# Patient Record
Sex: FEMALE | Race: WHITE | NOT HISPANIC OR LATINO | Employment: OTHER | URBAN - METROPOLITAN AREA
[De-identification: names, ages, dates, MRNs, and addresses within clinical notes are randomized per-mention and may not be internally consistent; named-entity substitution may affect disease eponyms.]

---

## 2018-01-17 ENCOUNTER — HOSPITAL ENCOUNTER (EMERGENCY)
Facility: CLINIC | Age: 73
Discharge: HOME | End: 2018-01-17
Attending: PHYSICIAN ASSISTANT | Admitting: PHYSICIAN ASSISTANT
Payer: MEDICARE

## 2018-01-17 VITALS
HEIGHT: 65 IN | TEMPERATURE: 98.9 F | BODY MASS INDEX: 34.99 KG/M2 | SYSTOLIC BLOOD PRESSURE: 169 MMHG | HEART RATE: 72 BPM | WEIGHT: 210 LBS | DIASTOLIC BLOOD PRESSURE: 79 MMHG | RESPIRATION RATE: 18 BRPM | OXYGEN SATURATION: 97 %

## 2018-01-17 DIAGNOSIS — N30.01 ACUTE CYSTITIS WITH HEMATURIA: ICD-10-CM

## 2018-01-17 LAB
ALBUMIN UR-MCNC: 100 MG/DL
APPEARANCE UR: ABNORMAL
BILIRUB UR QL STRIP: NEGATIVE
COLOR UR AUTO: ABNORMAL
GLUCOSE UR STRIP-MCNC: NEGATIVE MG/DL
HGB UR QL STRIP: ABNORMAL
KETONES UR STRIP-MCNC: NEGATIVE MG/DL
LEUKOCYTE ESTERASE UR QL STRIP: ABNORMAL
NITRATE UR QL: POSITIVE
PH UR STRIP: 6.5 PH (ref 5–7)
RBC #/AREA URNS AUTO: >182 /HPF (ref 0–2)
SOURCE: ABNORMAL
SP GR UR STRIP: 1.01 (ref 1–1.03)
UROBILINOGEN UR STRIP-MCNC: NEGATIVE MG/DL (ref 0–2)
WBC #/AREA URNS AUTO: 954 /HPF (ref 0–2)

## 2018-01-17 PROCEDURE — 81001 URINALYSIS AUTO W/SCOPE: CPT | Performed by: PHYSICIAN ASSISTANT

## 2018-01-17 PROCEDURE — 87086 URINE CULTURE/COLONY COUNT: CPT | Performed by: PHYSICIAN ASSISTANT

## 2018-01-17 PROCEDURE — 87186 SC STD MICRODIL/AGAR DIL: CPT | Performed by: PHYSICIAN ASSISTANT

## 2018-01-17 PROCEDURE — 99283 EMERGENCY DEPT VISIT LOW MDM: CPT

## 2018-01-17 PROCEDURE — 87088 URINE BACTERIA CULTURE: CPT | Performed by: PHYSICIAN ASSISTANT

## 2018-01-17 RX ORDER — CEPHALEXIN 500 MG/1
500 CAPSULE ORAL 3 TIMES DAILY
Qty: 21 CAPSULE | Refills: 0 | Status: SHIPPED | OUTPATIENT
Start: 2018-01-17 | End: 2018-01-24

## 2018-01-17 RX ORDER — LOSARTAN POTASSIUM AND HYDROCHLOROTHIAZIDE 12.5; 1 MG/1; MG/1
1 TABLET ORAL DAILY
COMMUNITY
End: 2019-06-09

## 2018-01-17 RX ORDER — METOPROLOL SUCCINATE 50 MG/1
50 TABLET, EXTENDED RELEASE ORAL DAILY
COMMUNITY
End: 2019-06-09

## 2018-01-17 RX ORDER — PHENAZOPYRIDINE HYDROCHLORIDE 100 MG/1
100 TABLET, FILM COATED ORAL 3 TIMES DAILY PRN
Qty: 6 TABLET | Refills: 0 | Status: SHIPPED | OUTPATIENT
Start: 2018-01-17 | End: 2018-01-19

## 2018-01-17 RX ORDER — VENLAFAXINE HYDROCHLORIDE 225 MG/1
150 TABLET, EXTENDED RELEASE ORAL
Status: ON HOLD | COMMUNITY
End: 2019-11-19

## 2018-01-17 ASSESSMENT — ENCOUNTER SYMPTOMS
ABDOMINAL PAIN: 0
DYSURIA: 1
VOMITING: 0
HEMATURIA: 1
FATIGUE: 1
FLANK PAIN: 0
BACK PAIN: 0
FEVER: 0

## 2018-01-17 NOTE — ED AVS SNAPSHOT
[facility] Emergency Department    [address] [place] [address]    Phone:  [phone]    Fax:  [phone]                                       Princess Edgar   MRN: [id number]    Department:  [facility] Emergency Department   Date of Visit:  1/17/2018           Patient Information     Date Of Birth          1945        Your diagnoses for this visit were:     Acute cystitis with hematuria        You were seen by Tonya Obrien PA-C.      Follow-up Information     Follow up with [facility] Emergency Department.    Specialty:  EMERGENCY MEDICINE    Why:  If symptoms worsen    Contact information:    [address] [place] [address] [phone]        Follow up with [facility] In 2 days.    Contact information:    [facility]  [address] [place] [address]  [phone]          Discharge Instructions       Discharge Instructions  Urinary Tract Infection  You or your child have been diagnosed with a urinary tract infection, or UTI. The urinary tract includes the kidneys (which make urine/pee), ureters (the tubes that carry urine/pee from the kidneys to the bladder), the bladder (which stores urine/pee), and urethra (the tube that carries urine/pee out of the bladder). Urinary tract infections occur when bacteria travel up the urethra into the bladder (bladder infection) and, in some cases, from there into the kidneys (kidney infection).  Generally, every Emergency Department visit should have a follow-up clinic visit with either a primary or a specialty clinic/provider. Please follow-up as instructed by your emergency provider today.  Return to the Emergency Department if:    You or your child have severe back pain.    You or your child are vomiting (throwing up) so that you cannot take your medicine.    You or your child have a new fever (had not previously had a fever) over 101 F.    You or your  child have confusion or are very weak, or feel very ill.    Your child seems much more ill, will not wake up, will not respond right, or is crying for a long time and will not calm down.    You or your child are showing signs of dehydration. These signs may include decreased urination (pee), dry mouth/gums/tongue, or decreased activity.    Follow-up with your provider:     Children under 24 months need to be seen by their regular provider within one week after a diagnosis of a UTI. It may be necessary to do some more tests to look at the child s kidney or bladder.    You should begin to feel better within 24 - 48 hours of starting your antibiotic; follow-up with your regular clinic/doctor/provider if this is not the case.    Treatment:     You will be treated with an antibiotic to kill the bacteria. We have to make an educated guess, based on what we know about common bacteria and antibiotics, as to which antibiotic will work for your infection. We will be correct most times but there will be some cases where the antibiotic chosen is not correct (see urine cultures below).    Take a pain medication such as acetaminophen (Tylenol ) or ibuprofen (Advil , Motrin , Nuprin ).    Phenazopyridine (Pyridium , Uristat ) is a prescription medication that numbs the bladder to reduce the burning pain of some UTIs.  The same medication is available in a non-prescription version (Azo-Standard , Urodol ). This medication will change the color of the urine and tears (usually blue or orange). If you wear contacts, do not wear them while taking this medication as they may be stained by the medication.    Urine Cultures:    If indicated, a urine culture may have been performed today. This test generally takes 24-48 hours to complete so the results are not known at this time. The results can confirm that an infection is present but also determine which antibiotic is effective for the specific bacteria that is causing the infection. If  "your urine culture shows that the antibiotic you were given today will not work to treat your infection, we will attempt to contact you to make arrangements to change the antibiotic. If the culture confirms that the antibiotic is effective for your infection, you will not be contacted. We often recommend follow-up with your regular physician/provider on the culture results regardless of this process.    Antibiotic Warning:     If you have been placed on antibiotics - watch for signs of allergic reaction.  These include rash, lip swelling, difficulty breathing, wheezing, and dizziness.  If you develop any of these symptoms, stop the antibiotic immediately and go to an emergency room or urgent care for evaluation.    Probiotics: If you have been given an antibiotic, you may want to also take a probiotic pill or eat yogurt with live cultures. Probiotics have \"good bacteria\" to help your intestines stay healthy. Studies have shown that probiotics help prevent diarrhea and other intestine problems (including C. diff infection) when you take antibiotics. You can buy these without a prescription in the pharmacy section of the store.   If you were given a prescription for medicine here today, be sure to read all of the information (including the package insert) that comes with your prescription.  This will include important information about the medicine, its side effects, and any warnings that you need to know about.  The pharmacist who fills the prescription can provide more information and answer questions you may have about the medicine.  If you have questions or concerns that the pharmacist cannot address, please call or return to the Emergency Department.   Remember that you can always come back to the Emergency Department if you are not able to see your regular provider in the amount of time listed above, if you get any new symptoms, or if there is anything that worries you.      24 Hour Appointment Hotline       To " make an appointment at any [facility] clinic, call [phone] ([phone]). If you don't have a family doctor or clinic, we will help you find one. [facility] clinics are conveniently located to serve the needs of you and your family.             Review of your medicines      START taking        Dose / Directions Last dose taken    cephALEXin 500 MG capsule   Commonly known as:  KEFLEX   Dose:  500 mg   Quantity:  21 capsule        Take 1 capsule (500 mg) by mouth 3 times daily for 7 days   Refills:  0        phenazopyridine 100 MG tablet   Commonly known as:  PYRIDIUM   Dose:  100 mg   Quantity:  6 tablet        Take 1 tablet (100 mg) by mouth 3 times daily as needed for urinary tract discomfort   Refills:  0          Our records show that you are taking the medicines listed below. If these are incorrect, please call your family doctor or clinic.        Dose / Directions Last dose taken    ALLEGRA PO        Refills:  0        LORAZEPAM PO        Refills:  0        losartan-hydrochlorothiazide 100-12.5 MG per tablet   Commonly known as:  HYZAAR   Dose:  1 tablet        Take 1 tablet by mouth daily   Refills:  0        metoprolol succinate 50 MG 24 hr tablet   Commonly known as:  TOPROL-XL   Dose:  50 mg        Take 50 mg by mouth daily   Refills:  0        RANITIDINE HCL PO   Dose:  150 mg        Take 150 mg by mouth   Refills:  0        SIMVASTATIN PO   Dose:  20 mg        Take 20 mg by mouth   Refills:  0        venlafaxine 225 MG Tb24 24 hr tablet   Commonly known as:  EFFEXOR-ER   Dose:  225 mg        Take 225 mg by mouth daily (with breakfast)   Refills:  0                Prescriptions were sent or printed at these locations (2 Prescriptions)                   Other Prescriptions                Printed at Department/Unit printer (2 of 2)         cephALEXin (KEFLEX) 500 MG capsule               phenazopyridine (PYRIDIUM) 100 MG tablet                Procedures and tests performed during your visit     UA  with Microscopic    Urine Culture      Orders Needing Specimen Collection     None      Pending Results     Date and Time Order Name Status Description    1/17/2018 2057 Urine Culture In process             Pending Culture Results     Date and Time Order Name Status Description    1/17/2018 2057 Urine Culture In process             Pending Results Instructions     If you had any lab results that were not finalized at the time of your Discharge, you can call the ED Lab Result RN at [phone]. You will be contacted by this team for any positive Lab results or changes in treatment. The nurses are available 7 days a week from 10A to 6:30P.  You can leave a message 24 hours per day and they will return your call.        Test Results From Your Hospital Stay        1/17/2018  9:42 PM      Component Results     Component Value Ref Range & Units Status    Color Urine Red  Final    Appearance Urine Cloudy  Final    Glucose Urine Negative NEG^Negative mg/dL Final    Bilirubin Urine Negative NEG^Negative Final    Ketones Urine Negative NEG^Negative mg/dL Final    Specific Gravity Urine 1.015 1.003 - 1.035 Final    Blood Urine Large (A) NEG^Negative Final    pH Urine 6.5 5.0 - 7.0 pH Final    Protein Albumin Urine 100 (A) NEG^Negative mg/dL Final    Urobilinogen mg/dL Negative 0.0 - 2.0 mg/dL Final    Nitrite Urine Positive (A) NEG^Negative Final    Leukocyte Esterase Urine Moderate (A) NEG^Negative Final    Source Midstream Urine  Final    WBC Urine 954 (H) 0 - 2 /HPF Final    RBC Urine >182 (H) 0 - 2 /HPF Final         1/17/2018  9:17 PM                Clinical Quality Measure: Blood Pressure Screening     Your blood pressure was checked while you were in the emergency department today. The last reading we obtained was  BP: 169/79 . Please read the guidelines below about what these numbers mean and what you should do about them.  If your systolic blood pressure (the top number) is less than 120 and your diastolic blood  "pressure (the bottom number) is less than 80, then your blood pressure is normal. There is nothing more that you need to do about it.  If your systolic blood pressure (the top number) is 120-139 or your diastolic blood pressure (the bottom number) is 80-89, your blood pressure may be higher than it should be. You should have your blood pressure rechecked within a year by a primary care provider.  If your systolic blood pressure (the top number) is 140 or greater or your diastolic blood pressure (the bottom number) is 90 or greater, you may have high blood pressure. High blood pressure is treatable, but if left untreated over time it can put you at risk for heart attack, stroke, or kidney failure. You should have your blood pressure rechecked by a primary care provider within the next 4 weeks.  If your provider in the emergency department today gave you specific instructions to follow-up with your doctor or provider even sooner than that, you should follow that instruction and not wait for up to 4 weeks for your follow-up visit.        Thank you for choosing [facility]       Thank you for choosing [facility] for your care. Our goal is always to provide you with excellent care. Hearing back from our patients is one way we can continue to improve our services. Please take a few minutes to complete the written survey that you may receive in the mail after you visit with us. Thank you!        [employer] Information     [employer] lets you send messages to your doctor, view your test results, renew your prescriptions, schedule appointments and more. To sign up, go to [email or web address] . Click on \"Log in\" on the left side of the screen, which will take you to the Welcome page. Then click on \"Sign up Now\" on the right side of the page.     You will be asked to enter the access code listed below, as well as some personal information. Please follow the directions to create your username and password.     Your access code is: " [email or web address]  Expires: 2018  9:54 PM     Your access code will  in 90 days. If you need help or a new code, please call your [facility] clinic or [phone].        Care EveryWhere ID     This is your Care EveryWhere ID. This could be used by other organizations to access your [facility] medical records  [id number]        Equal Access to Services     [facility]: Hadii dena hamo Sobrittny, waaxda luqadaha, qaybta kaalmada adesarahyyada, nabil singh . So [facility] [phone].    ATENCIÓN: Si habla español, tiene a key disposición servicios gratuitos de asistencia lingüística. Llame al [phone].    We comply with applicable federal civil rights laws and [place] laws. We do not discriminate on the basis of race, color, national origin, age, disability, sex, sexual orientation, or gender identity.            After Visit Summary       This is your record. Keep this with you and show to your community pharmacist(s) and doctor(s) at your next visit.

## 2018-01-17 NOTE — ED AVS SNAPSHOT
[facility] Emergency Department    [address]    [facility] [address]    Phone:  [phone]    Fax:  [phone]                                       Princess Edgar   MRN: [id number]    Department:  [facility] Emergency Department   Date of Visit:  1/17/2018           After Visit Summary Signature Page     I have received my discharge instructions, and my questions have been answered. I have discussed any challenges I see with this plan with the nurse or doctor.    ..........................................................................................................................................  Patient/Patient Representative Signature      ..........................................................................................................................................  Patient Representative Print Name and Relationship to Patient    ..................................................               ................................................  Date                                            Time    ..........................................................................................................................................  Reviewed by Signature/Title    ...................................................              ..............................................  Date                                                            Time

## 2018-01-18 NOTE — ED PROVIDER NOTES
"  History     Chief Complaint:  Urinary symptoms    HPI   Princess Edgar is a 73 year old female with a history of bladder infections and diabetes who presents to the emergency department today for evaluation of urinary symptoms. The patient reports since 1800 she developed hematuria, dysuria, and fatigue. She reports symptoms are the same for previous urinary tract infections. The patient denies fevers, vomiting, back pain, flank pain, or abdominal pain.    Allergies:  Augmentin     Medications:    Allegra  Lorazepam   Hyzaar  Metoprolol  Ranitidine  Simvastatin   Effexor    Past Medical History:    Anxiety   Arthritis   Diabetes   Glaucoma (increased eye pressure)   Hypertension     Past Surgical History:    Cardiac surgery   Eye surgery   Orthopedic surgery     Family History:    History reviewed. No pertinent family history.     Social History:  Smokeless Tobacco: Never Used  Alcohol Use: Positive   Marital Status:  Single [1]    Review of Systems   Constitutional: Positive for fatigue. Negative for fever.   Gastrointestinal: Negative for abdominal pain and vomiting.   Genitourinary: Positive for dysuria and hematuria. Negative for flank pain.   Musculoskeletal: Negative for back pain.   All other systems reviewed and are negative.    Physical Exam     Patient Vitals for the past 24 hrs:   BP Temp Temp src Pulse Resp SpO2 Height Weight   01/17/18 2054 - - - - - 97 % - -   01/17/18 2053 169/79 98.9  F (37.2  C) Temporal 72 18 - 1.651 m (5' 5\") 95.3 kg (210 lb)     Physical Exam  Nursing note and vitals reviewed.     GENERAL: Alert, mild distress, non toxic appearing.   HEENT: Normal conjunctiva. No scleral icterus. MMM.   NECK: Supple.  CARDIAC: Normal rate and regular rhythm. Normal heart sounds. No murmurs, rubs, or gallops appreciated.  PULMONARY: CTA bilaterally. Normal breath sounds. No wheezing, crackles, or rhonchi appreciated.  ABDOMEN: Soft, non distended abdomen. Non-tender. No rebound or guarding. "   NEURO: Alert and oriented. Non-focal.   MUSCULOSKELETAL: Normal range of motion. No peripheral edema. No CVA tenderness.    SKIN: Skin is warm and dry. No rashes. No pallor or jaundice.   PSYCH: Normal affect and mood.     Emergency Department Course     Laboratory:  Laboratory findings were communicated with the patient who voiced understanding of the findings.    UA: Blood: Large (A), Protein Albumin: 100 (A), Nitrite: Positive (A), Leukocyte Esterase: Moderate (A), WBC/HPF: 954 (H), RBC/HPF: >182 (H)   Urine Culture: Pending     Emergency Department Course:    2053 Nursing notes and vitals reviewed.    2100 The patient provided a urine sample here in the emergency department. This was sent for laboratory testing, findings above.    2106 I performed an exam of the patient as documented above.     2152 I personally reviewed the laboratory results with the patient and answered all related questions prior to discharge. I discussed the treatment plan with the patient. She expressed understanding of this plan and consented to discharge. She will be discharged home with instructions for care and follow up. In addition, the patient will return to the emergency department if their symptoms persist, worsen, if new symptoms arise or if there is any concern.  All questions were answered.    Impression & Plan      Medical Decision Making:  Princess Edgar is a 73 year old female who presents to the emergency department today for evaluation of symptoms consistent with a urinary tract infection. Urinalysis confirms this. She is non-toxic appearing and well hydrated with no signs of shock, sepsis, or respiratory distress. I will treat with antibiotics. Urine culture pending. I discussed with patient that Radha will contact her should a different antibiotic be indicated based on culture results. No clinical evidence of pyelonephritis. Doubt ureteral stone given no acute flank pain. Given benign abdomen, no indication for  advanced imaging as my suspicion for any acute intraabdominal process including diverticulitis, appendicitis, obstruction, etc. is quite low.    The patient is discharged home with instructions to rest, drink plenty of fluids, take prescribed antibiotics, and Tylenol and Ibuprofen as needed. Recommended follow-up with primary care provider in 48 hours if symptoms are not improving. Reviewed reasons to return to the ED, including fever, vomiting, severe back pain, or worsening symptoms. The patient was in agreement with plan and discharged in satisfactory condition with all questions answered.  Of note, blood pressure slightly elevated. No associated symptoms. No evidence of hypertensive urgency or emergency. She has known history of hypertension. No indication for further work up at this time.     Diagnosis:    ICD-10-CM    1. Acute cystitis with hematuria N30.01      Disposition:   The patient is discharged to home.    Discharge Medications:  Discharge Medication List as of 1/17/2018  9:54 PM      START taking these medications    Details   cephALEXin (KEFLEX) 500 MG capsule Take 1 capsule (500 mg) by mouth 3 times daily for 7 days, Disp-21 capsule, R-0, Local Print      phenazopyridine (PYRIDIUM) 100 MG tablet Take 1 tablet (100 mg) by mouth 3 times daily as needed for urinary tract discomfort, Disp-6 tablet, R-0, Local Print           Scribe Disclosure:  I, Tonie Galeano, am serving as a scribe at 9:04 PM on 1/17/2018 to document services personally performed by Tonya Obrien PA-C based on my observations and the provider's statements to me.    [facility] EMERGENCY DEPARTMENT       Tonya Obrien PA-C  01/17/18 8726

## 2018-01-19 LAB
BACTERIA SPEC CULT: ABNORMAL
BACTERIA SPEC CULT: ABNORMAL
Lab: ABNORMAL
SPECIMEN SOURCE: ABNORMAL

## 2018-01-20 ENCOUNTER — TELEPHONE (OUTPATIENT)
Dept: EMERGENCY MEDICINE | Facility: CLINIC | Age: 73
End: 2018-01-20

## 2018-01-20 NOTE — TELEPHONE ENCOUNTER
[facility]  Emergency Department Lab result notification:    Reason for call  Notify of lab results, assess symptoms,  review ED providers recommendations (if necessary) and advise per ED lab result f/u protocol.    Lab result  Final Urine Culture Report on 1/19/18  [facility] ED discharge antibiotic: Cephalexin (Keflex) 500 mg capsule,  1 capsule (500 mg) by mouth 3 times daily for 7 days  #1. Bacteria, >100,000 colonies/mL Escherichia coli, is SUSCEPTIBLE to ED discharge antibiotic.    As per [facility] ED Lab Result protocol, no change in antibiotic therapy.    Left voicemail message requesting a call back to [phone] between 10 a.m. and 6:30 p.m. for patient's ED/ lab results.    India Hadley RN    [facility] RN  Lung Nodule and ED Lab Results F/U RN  Epic pool (ED late result f/u RN) : P [id number]   # [phone]

## 2019-06-09 ENCOUNTER — HOSPITAL ENCOUNTER (INPATIENT)
Facility: CLINIC | Age: 74
LOS: 3 days | Discharge: HOME | DRG: 287 | End: 2019-06-12
Attending: EMERGENCY MEDICINE | Admitting: INTERNAL MEDICINE
Payer: COMMERCIAL

## 2019-06-09 ENCOUNTER — APPOINTMENT (OUTPATIENT)
Dept: GENERAL RADIOLOGY | Facility: CLINIC | Age: 74
DRG: 287 | End: 2019-06-09
Attending: EMERGENCY MEDICINE
Payer: COMMERCIAL

## 2019-06-09 DIAGNOSIS — I20.0 UNSTABLE ANGINA (H): ICD-10-CM

## 2019-06-09 DIAGNOSIS — R73.9 HYPERGLYCEMIA: ICD-10-CM

## 2019-06-09 PROBLEM — I24.9 ACS (ACUTE CORONARY SYNDROME) (H): Status: ACTIVE | Noted: 2019-06-09

## 2019-06-09 LAB
ANION GAP SERPL CALCULATED.3IONS-SCNC: 6 MMOL/L (ref 3–14)
BASOPHILS # BLD AUTO: 0.1 10E9/L (ref 0–0.2)
BASOPHILS NFR BLD AUTO: 0.8 %
BUN SERPL-MCNC: 14 MG/DL (ref 7–30)
CALCIUM SERPL-MCNC: 8.7 MG/DL (ref 8.5–10.1)
CHLORIDE SERPL-SCNC: 106 MMOL/L (ref 94–109)
CO2 SERPL-SCNC: 27 MMOL/L (ref 20–32)
CREAT SERPL-MCNC: 1.03 MG/DL (ref 0.52–1.04)
DIFFERENTIAL METHOD BLD: ABNORMAL
EOSINOPHIL # BLD AUTO: 0.6 10E9/L (ref 0–0.7)
EOSINOPHIL NFR BLD AUTO: 7.3 %
ERYTHROCYTE [DISTWIDTH] IN BLOOD BY AUTOMATED COUNT: 13.2 % (ref 10–15)
ERYTHROCYTE [DISTWIDTH] IN BLOOD BY AUTOMATED COUNT: 13.2 % (ref 10–15)
GFR SERPL CREATININE-BSD FRML MDRD: 54 ML/MIN/{1.73_M2}
GLUCOSE BLDC GLUCOMTR-MCNC: 106 MG/DL (ref 70–99)
GLUCOSE BLDC GLUCOMTR-MCNC: 78 MG/DL (ref 70–99)
GLUCOSE SERPL-MCNC: 334 MG/DL (ref 70–99)
HBA1C MFR BLD: 6.2 % (ref 0–5.6)
HCT VFR BLD AUTO: 45.6 % (ref 35–47)
HCT VFR BLD AUTO: 46.5 % (ref 35–47)
HGB BLD-MCNC: 13.9 G/DL (ref 11.7–15.7)
HGB BLD-MCNC: 14.4 G/DL (ref 11.7–15.7)
IMM GRANULOCYTES # BLD: 0 10E9/L (ref 0–0.4)
IMM GRANULOCYTES NFR BLD: 0.3 %
INTERPRETATION ECG - MUSE: NORMAL
LYMPHOCYTES # BLD AUTO: 2 10E9/L (ref 0.8–5.3)
LYMPHOCYTES NFR BLD AUTO: 23.3 %
MCH RBC QN AUTO: 29.8 PG (ref 26.5–33)
MCH RBC QN AUTO: 29.9 PG (ref 26.5–33)
MCHC RBC AUTO-ENTMCNC: 30.5 G/DL (ref 31.5–36.5)
MCHC RBC AUTO-ENTMCNC: 31 G/DL (ref 31.5–36.5)
MCV RBC AUTO: 97 FL (ref 78–100)
MCV RBC AUTO: 98 FL (ref 78–100)
MONOCYTES # BLD AUTO: 0.9 10E9/L (ref 0–1.3)
MONOCYTES NFR BLD AUTO: 10 %
NEUTROPHILS # BLD AUTO: 5.1 10E9/L (ref 1.6–8.3)
NEUTROPHILS NFR BLD AUTO: 58.3 %
NRBC # BLD AUTO: 0 10*3/UL
NRBC BLD AUTO-RTO: 0 /100
PLATELET # BLD AUTO: 304 10E9/L (ref 150–450)
PLATELET # BLD AUTO: 332 10E9/L (ref 150–450)
POTASSIUM SERPL-SCNC: 4 MMOL/L (ref 3.4–5.3)
RBC # BLD AUTO: 4.66 10E12/L (ref 3.8–5.2)
RBC # BLD AUTO: 4.81 10E12/L (ref 3.8–5.2)
SODIUM SERPL-SCNC: 139 MMOL/L (ref 133–144)
TROPONIN I SERPL-MCNC: <0.015 UG/L (ref 0–0.04)
TROPONIN I SERPL-MCNC: <0.015 UG/L (ref 0–0.04)
WBC # BLD AUTO: 7.8 10E9/L (ref 4–11)
WBC # BLD AUTO: 8.7 10E9/L (ref 4–11)

## 2019-06-09 PROCEDURE — 71046 X-RAY EXAM CHEST 2 VIEWS: CPT

## 2019-06-09 PROCEDURE — 96365 THER/PROPH/DIAG IV INF INIT: CPT

## 2019-06-09 PROCEDURE — 99285 EMERGENCY DEPT VISIT HI MDM: CPT | Mod: 25

## 2019-06-09 PROCEDURE — 25000128 H RX IP 250 OP 636: Performed by: EMERGENCY MEDICINE

## 2019-06-09 PROCEDURE — 84484 ASSAY OF TROPONIN QUANT: CPT | Performed by: INTERNAL MEDICINE

## 2019-06-09 PROCEDURE — 85027 COMPLETE CBC AUTOMATED: CPT | Performed by: INTERNAL MEDICINE

## 2019-06-09 PROCEDURE — 83036 HEMOGLOBIN GLYCOSYLATED A1C: CPT | Performed by: INTERNAL MEDICINE

## 2019-06-09 PROCEDURE — 12000000 ZZH R&B MED SURG/OB

## 2019-06-09 PROCEDURE — 25000132 ZZH RX MED GY IP 250 OP 250 PS 637: Performed by: INTERNAL MEDICINE

## 2019-06-09 PROCEDURE — 99223 1ST HOSP IP/OBS HIGH 75: CPT | Mod: AI | Performed by: INTERNAL MEDICINE

## 2019-06-09 PROCEDURE — 93005 ELECTROCARDIOGRAM TRACING: CPT | Mod: 76

## 2019-06-09 PROCEDURE — 25000132 ZZH RX MED GY IP 250 OP 250 PS 637: Performed by: EMERGENCY MEDICINE

## 2019-06-09 PROCEDURE — 84484 ASSAY OF TROPONIN QUANT: CPT | Performed by: EMERGENCY MEDICINE

## 2019-06-09 PROCEDURE — 85025 COMPLETE CBC W/AUTO DIFF WBC: CPT | Performed by: EMERGENCY MEDICINE

## 2019-06-09 PROCEDURE — 93005 ELECTROCARDIOGRAM TRACING: CPT

## 2019-06-09 PROCEDURE — 00000146 ZZHCL STATISTIC GLUCOSE BY METER IP

## 2019-06-09 PROCEDURE — 80048 BASIC METABOLIC PNL TOTAL CA: CPT | Performed by: EMERGENCY MEDICINE

## 2019-06-09 PROCEDURE — A9270 NON-COVERED ITEM OR SERVICE: HCPCS | Performed by: EMERGENCY MEDICINE

## 2019-06-09 PROCEDURE — 36415 COLL VENOUS BLD VENIPUNCTURE: CPT | Performed by: INTERNAL MEDICINE

## 2019-06-09 RX ORDER — FLUTICASONE PROPIONATE 50 MCG
2 SPRAY, SUSPENSION (ML) NASAL DAILY PRN
COMMUNITY

## 2019-06-09 RX ORDER — ALBUTEROL SULFATE 90 UG/1
2 AEROSOL, METERED RESPIRATORY (INHALATION) EVERY 4 HOURS PRN
COMMUNITY

## 2019-06-09 RX ORDER — OLOPATADINE HYDROCHLORIDE 2 MG/ML
1 SOLUTION/ DROPS OPHTHALMIC DAILY PRN
COMMUNITY

## 2019-06-09 RX ORDER — FLUTICASONE PROPIONATE 50 MCG
2 SPRAY, SUSPENSION (ML) NASAL DAILY
Status: DISCONTINUED | OUTPATIENT
Start: 2019-06-10 | End: 2019-06-12 | Stop reason: HOSPADM

## 2019-06-09 RX ORDER — ACETAMINOPHEN 650 MG/1
650 SUPPOSITORY RECTAL EVERY 4 HOURS PRN
Status: DISCONTINUED | OUTPATIENT
Start: 2019-06-09 | End: 2019-06-12 | Stop reason: HOSPADM

## 2019-06-09 RX ORDER — PROCHLORPERAZINE 25 MG
12.5 SUPPOSITORY, RECTAL RECTAL EVERY 12 HOURS PRN
Status: DISCONTINUED | OUTPATIENT
Start: 2019-06-09 | End: 2019-06-12 | Stop reason: HOSPADM

## 2019-06-09 RX ORDER — NICOTINE POLACRILEX 4 MG
15-30 LOZENGE BUCCAL
Status: DISCONTINUED | OUTPATIENT
Start: 2019-06-09 | End: 2019-06-12 | Stop reason: HOSPADM

## 2019-06-09 RX ORDER — LOSARTAN POTASSIUM 50 MG/1
50 TABLET ORAL 2 TIMES DAILY
COMMUNITY
End: 2020-02-17 | Stop reason: ALTCHOICE

## 2019-06-09 RX ORDER — ALBUTEROL SULFATE 90 UG/1
2 AEROSOL, METERED RESPIRATORY (INHALATION) EVERY 4 HOURS PRN
Status: DISCONTINUED | OUTPATIENT
Start: 2019-06-09 | End: 2019-06-12 | Stop reason: HOSPADM

## 2019-06-09 RX ORDER — POTASSIUM CL/LIDO/0.9 % NACL 10MEQ/0.1L
10 INTRAVENOUS SOLUTION, PIGGYBACK (ML) INTRAVENOUS
Status: DISCONTINUED | OUTPATIENT
Start: 2019-06-09 | End: 2019-06-12 | Stop reason: HOSPADM

## 2019-06-09 RX ORDER — AMLODIPINE BESYLATE 5 MG/1
5 TABLET ORAL DAILY
Status: ON HOLD | COMMUNITY
End: 2019-11-21

## 2019-06-09 RX ORDER — ASPIRIN 81 MG/1
81 TABLET ORAL DAILY
Status: DISCONTINUED | OUTPATIENT
Start: 2019-06-10 | End: 2019-06-10 | Stop reason: DRUGHIGH

## 2019-06-09 RX ORDER — AMLODIPINE BESYLATE 5 MG/1
5 TABLET ORAL DAILY
Status: DISCONTINUED | OUTPATIENT
Start: 2019-06-10 | End: 2019-06-12 | Stop reason: HOSPADM

## 2019-06-09 RX ORDER — MAGNESIUM SULFATE HEPTAHYDRATE 40 MG/ML
4 INJECTION, SOLUTION INTRAVENOUS EVERY 4 HOURS PRN
Status: DISCONTINUED | OUTPATIENT
Start: 2019-06-09 | End: 2019-06-12 | Stop reason: HOSPADM

## 2019-06-09 RX ORDER — METOCLOPRAMIDE 5 MG/1
5 TABLET ORAL EVERY 6 HOURS PRN
Status: DISCONTINUED | OUTPATIENT
Start: 2019-06-09 | End: 2019-06-12 | Stop reason: HOSPADM

## 2019-06-09 RX ORDER — ASPIRIN 81 MG/1
324 TABLET, CHEWABLE ORAL ONCE
Status: COMPLETED | OUTPATIENT
Start: 2019-06-09 | End: 2019-06-09

## 2019-06-09 RX ORDER — ALUMINA, MAGNESIA, AND SIMETHICONE 2400; 2400; 240 MG/30ML; MG/30ML; MG/30ML
30 SUSPENSION ORAL EVERY 4 HOURS PRN
Status: DISCONTINUED | OUTPATIENT
Start: 2019-06-09 | End: 2019-06-12 | Stop reason: HOSPADM

## 2019-06-09 RX ORDER — METOPROLOL TARTRATE 25 MG/1
12.5 TABLET, FILM COATED ORAL 2 TIMES DAILY
COMMUNITY
End: 2020-03-03 | Stop reason: ALTCHOICE

## 2019-06-09 RX ORDER — POTASSIUM CHLORIDE 29.8 MG/ML
20 INJECTION INTRAVENOUS
Status: DISCONTINUED | OUTPATIENT
Start: 2019-06-09 | End: 2019-06-12 | Stop reason: HOSPADM

## 2019-06-09 RX ORDER — METFORMIN HCL 500 MG
1000 TABLET, EXTENDED RELEASE 24 HR ORAL 2 TIMES DAILY WITH MEALS
COMMUNITY
End: 2021-01-13

## 2019-06-09 RX ORDER — VENLAFAXINE HYDROCHLORIDE 150 MG/1
150 CAPSULE, EXTENDED RELEASE ORAL
Status: DISCONTINUED | OUTPATIENT
Start: 2019-06-10 | End: 2019-06-12 | Stop reason: HOSPADM

## 2019-06-09 RX ORDER — PROCHLORPERAZINE MALEATE 5 MG
5 TABLET ORAL EVERY 6 HOURS PRN
Status: DISCONTINUED | OUTPATIENT
Start: 2019-06-09 | End: 2019-06-12 | Stop reason: HOSPADM

## 2019-06-09 RX ORDER — ONDANSETRON 2 MG/ML
4 INJECTION INTRAMUSCULAR; INTRAVENOUS EVERY 6 HOURS PRN
Status: DISCONTINUED | OUTPATIENT
Start: 2019-06-09 | End: 2019-06-12 | Stop reason: HOSPADM

## 2019-06-09 RX ORDER — METOCLOPRAMIDE HYDROCHLORIDE 5 MG/ML
5 INJECTION INTRAMUSCULAR; INTRAVENOUS EVERY 6 HOURS PRN
Status: DISCONTINUED | OUTPATIENT
Start: 2019-06-09 | End: 2019-06-12 | Stop reason: HOSPADM

## 2019-06-09 RX ORDER — DEXTROSE MONOHYDRATE 25 G/50ML
25-50 INJECTION, SOLUTION INTRAVENOUS
Status: DISCONTINUED | OUTPATIENT
Start: 2019-06-09 | End: 2019-06-12 | Stop reason: HOSPADM

## 2019-06-09 RX ORDER — LORAZEPAM 0.5 MG/1
0.5 TABLET ORAL
Status: DISCONTINUED | OUTPATIENT
Start: 2019-06-09 | End: 2019-06-12 | Stop reason: HOSPADM

## 2019-06-09 RX ORDER — NALOXONE HYDROCHLORIDE 0.4 MG/ML
.1-.4 INJECTION, SOLUTION INTRAMUSCULAR; INTRAVENOUS; SUBCUTANEOUS
Status: DISCONTINUED | OUTPATIENT
Start: 2019-06-09 | End: 2019-06-11

## 2019-06-09 RX ORDER — VENLAFAXINE HYDROCHLORIDE 150 MG/1
150 TABLET, EXTENDED RELEASE ORAL
Status: DISCONTINUED | OUTPATIENT
Start: 2019-06-10 | End: 2019-06-09 | Stop reason: CLARIF

## 2019-06-09 RX ORDER — LIDOCAINE 40 MG/G
CREAM TOPICAL
Status: DISCONTINUED | OUTPATIENT
Start: 2019-06-09 | End: 2019-06-11

## 2019-06-09 RX ORDER — POTASSIUM CHLORIDE 7.45 MG/ML
10 INJECTION INTRAVENOUS
Status: DISCONTINUED | OUTPATIENT
Start: 2019-06-09 | End: 2019-06-12 | Stop reason: HOSPADM

## 2019-06-09 RX ORDER — ASPIRIN 81 MG/1
81 TABLET ORAL DAILY
COMMUNITY
End: 2019-06-26 | Stop reason: DRUGHIGH

## 2019-06-09 RX ORDER — FEXOFENADINE HCL AND PSEUDOEPHEDRINE HCL 180; 240 MG/1; MG/1
1 TABLET, EXTENDED RELEASE ORAL DAILY
Status: ON HOLD | COMMUNITY
End: 2019-06-12

## 2019-06-09 RX ORDER — ACETAMINOPHEN 325 MG/1
650 TABLET ORAL EVERY 4 HOURS PRN
Status: DISCONTINUED | OUTPATIENT
Start: 2019-06-09 | End: 2019-06-11

## 2019-06-09 RX ORDER — POTASSIUM CHLORIDE 1500 MG/1
20-40 TABLET, EXTENDED RELEASE ORAL
Status: DISCONTINUED | OUTPATIENT
Start: 2019-06-09 | End: 2019-06-12 | Stop reason: HOSPADM

## 2019-06-09 RX ORDER — ONDANSETRON 4 MG/1
4 TABLET, ORALLY DISINTEGRATING ORAL EVERY 6 HOURS PRN
Status: DISCONTINUED | OUTPATIENT
Start: 2019-06-09 | End: 2019-06-12 | Stop reason: HOSPADM

## 2019-06-09 RX ORDER — TRAZODONE HYDROCHLORIDE 100 MG/1
50-75 TABLET ORAL AT BEDTIME
COMMUNITY
End: 2019-06-26 | Stop reason: DRUGHIGH

## 2019-06-09 RX ORDER — MORPHINE SULFATE 4 MG/ML
2-4 INJECTION, SOLUTION INTRAMUSCULAR; INTRAVENOUS
Status: DISCONTINUED | OUTPATIENT
Start: 2019-06-09 | End: 2019-06-12 | Stop reason: HOSPADM

## 2019-06-09 RX ORDER — DIPHENOXYLATE HCL/ATROPINE 2.5-.025MG
1 TABLET ORAL 4 TIMES DAILY PRN
Status: ON HOLD | COMMUNITY
End: 2019-11-19

## 2019-06-09 RX ORDER — POTASSIUM CHLORIDE 1.5 G/1.58G
20-40 POWDER, FOR SOLUTION ORAL
Status: DISCONTINUED | OUTPATIENT
Start: 2019-06-09 | End: 2019-06-12 | Stop reason: HOSPADM

## 2019-06-09 RX ADMIN — Medication 4116 UNITS: at 19:20

## 2019-06-09 RX ADMIN — Medication 12.5 MG: at 22:32

## 2019-06-09 RX ADMIN — Medication 50 MG: at 22:32

## 2019-06-09 RX ADMIN — LORAZEPAM 0.5 MG: 0.5 TABLET ORAL at 22:36

## 2019-06-09 RX ADMIN — ASPIRIN 81 MG 324 MG: 81 TABLET ORAL at 17:48

## 2019-06-09 RX ADMIN — HEPARIN SODIUM 12 UNITS/KG/HR: 10000 INJECTION, SOLUTION INTRAVENOUS at 19:20

## 2019-06-09 RX ADMIN — RANITIDINE 150 MG: 150 TABLET ORAL at 22:32

## 2019-06-09 RX ADMIN — LORATADINE AND PSEUDOEPHEDRINE 1 TABLET: 10; 240 TABLET, EXTENDED RELEASE ORAL at 22:49

## 2019-06-09 ASSESSMENT — MIFFLIN-ST. JEOR
SCORE: 1369.88
SCORE: 1373.62

## 2019-06-09 ASSESSMENT — ACTIVITIES OF DAILY LIVING (ADL): ADLS_ACUITY_SCORE: 19

## 2019-06-09 NOTE — ED TRIAGE NOTES
Chest pain started Friday. Patient states pain radiated to neck and shoulders. Patient states pain is worse when she does any activity.   ABC intact alert and no distress.

## 2019-06-09 NOTE — Clinical Note
Patient going to holding room until sheath is removed from Right Femoral.  No family here to update.

## 2019-06-09 NOTE — ED PROVIDER NOTES
"  History     Chief Complaint:  Chest Pain      HPI   Princess Edgar is a 74 year old female with a hx of CAD s/p CABG in 1999, diabetes and hypertension, who presents to the ED for evaluation of chest pain. She says that since Friday, she has had discomfort that is induced by any kind of physical activity. She explains that today the chest discomfort radiated all the way into her throat and she also notes that she has pain in her shoulders. She says that he chest only hurts when she is moving and not when she lays down or at rest. Associated symptoms include shortness of breath and diaphoresis, also noted with exertion. She denies having nausea. Of note, she says that she has been taking allegra D for 53 years, but she ran out 3 days ago.    Allergies:  No known drug allergies      Medications:    Allegra  Lorazepam  Hyzaar  Toprol-XL  Ranitidine  Simvastatin  Effexor      Past Medical History:    Anxiety  Arthritis  Diabetes  Glaucoma   Hypertension    Past Surgical History:    Cardiac surgery  Eye surgery  Orthopedic surgery    Family History:    History reviewed. No pertinent family history.     Social History:  Arrived to the ED alone  Alcohol: Yes  Marital Status:  Single [1]     Review of Systems   Cardiovascular: Positive for chest pain.   All other systems reviewed and are negative.      Physical Exam     Patient Vitals for the past 24 hrs:   BP Temp Temp src Pulse Heart Rate Resp SpO2 Height Weight   06/09/19 2330 159/75 97.5  F (36.4  C) Oral -- 74 20 93 % -- --   06/09/19 2229 169/65 -- -- -- 76 -- -- -- --   06/09/19 1951 149/71 96.4  F (35.8  C) Oral -- 70 22 98 % 1.651 m (5' 5\") 86.3 kg (190 lb 3.2 oz)   06/09/19 1915 152/71 -- -- -- 74 -- 98 % -- --   06/09/19 1900 148/84 -- -- 73 74 23 96 % -- --   06/09/19 1854 -- -- -- -- -- -- -- 1.651 m (5' 5\") 85.9 kg (189 lb 6 oz)   06/09/19 1755 -- -- -- -- 82 21 96 % -- --   06/09/19 1750 153/73 -- -- 88 86 28 95 % -- --   06/09/19 1707 110/67 97  F (36.1 "  C) Temporal 112 -- 20 97 % -- --       Physical Exam  General:              Well-nourished              Speaking in full sentences  Eyes:              Conjunctiva without injection or scleral icterus  ENT:              Moist mucous membranes              Nares patent              Pinnae normal  Neck:              Full ROM              No stiffness appreciated  Resp:              Lungs CTAB              No crackles, wheezing or audible rubs              Good air movement  CV:                    Tachycardic rate, regular rhythm              S1 and S2 present              No murmur, gallop or rub  GI:              BS present              Abdomen soft without distention              Non-tender to light and deep palpation              No guarding or rebound tenderness  Skin:              Warm, dry, well perfused              No rashes or open wounds on exposed skin  MSK:              Moves all extremities              No focal deformities or swelling  Neuro:              Alert              Answers questions appropriately              Moves all extremities equally              Gait stable  Psych:              Normal affect, normal mood      Emergency Department Course   ECG #1  (17:01:56):  Rate 91 bpm. IN interval 164. QRS duration 78. QT/QTc 338/415. P-R-T axes 45 67 75. Normal sinus rhythm. Nonspecific ST abnormality Interpreted at 1702 by Marcos Pierce MD.    ECG #2 (17:48:02):  Rate 86 bpm. IN interval 170. QRS duration 74. QT/QTc 354/423. P-R-T axes 42 57 60. Normal sinus rhythm. Cannot rule out Anterior infarct, age undetermined. Abnormal ECG. Interpreted at 1750 by Marcos Pierce MD.    Imaging:  Radiographic findings were communicated with the patient who voiced understanding of the findings.    Chest XR, PA & LAT  IMPRESSION: The lungs are clear. No focal pulmonary opacities. Heart and mediastinum are unremarkable. No acute cardiopulmonary abnormalities. Midline sternal wires in place. Reading per  radiology      Laboratory:  Labs Ordered and Resulted from Time of ED Arrival Up to the Time of Departure from the ED   CBC WITH PLATELETS DIFFERENTIAL - Abnormal; Notable for the following components:       Result Value    MCHC 31.0 (*)     All other components within normal limits   BASIC METABOLIC PANEL - Abnormal; Notable for the following components:    Glucose 334 (*)     GFR Estimate 54 (*)     All other components within normal limits   TROPONIN I   PLATELETS MONITORED PER HEPARIN TREATMENT PROTOCOL (FOR MEANINGFUL USE   PULSE OXIMETRY NURSING   CARDIAC CONTINUOUS MONITORING   PERIPHERAL IV CATHETER   PATIENT CARE ORDER   CARDIAC CONTINUOUS MONITORING   PULSE OXIMETRY NURSING   PERIPHERAL IV CATHETER   PATIENT CARE ORDER   MEASURE WEIGHT   NOTIFY PHYSICIAN   NOTIFY PHYSICIAN     Interventions:  1748 aspirin 324 mg PO  1920 heparin infusion 12 units/kg/hr IV, and loading dose of 4116 U    Emergency Department Course:  Past medical records, nursing notes, and vitals reviewed.  1726: I performed an exam of the patient and obtained history, as documented above.     IV inserted and blood drawn.    The patient was sent for a Chest XR while in the emergency department, findings above.    1830: I rechecked the patient. Explained findings to patient.    1843: I talked on the phone with Dr. Neumann.    Findings and plan explained to the Patient who consents to admission.     1938: Discussed the patient with Dr. Neumann, who will admit the patient to a Cardiac Telemetry bed for further monitoring, evaluation, and treatment.            Impression & Plan    Medical Decision Making:  Princess Edgar is a 74-year-old female with a history of coronary artery disease status post CABG in 1999, presenting to the ER for evaluation of chest pain.  VS on presentation reveal elevated BP though otherwise are unremarkable.  By history and exam, symptoms are concerning for unstable angina.  Patient describes clear exertional chest  discomfort over the past 3 days, with improvement in symptoms with rest.  At the time of my evaluation, she is chest pain-free.  Her EKG on presentation demonstrates sinus rhythm with subtle ST depression in lead V3.  Posterior EKG does not reveal evidence of ST segment elevation to suggest acute posterior MI.  Aspirin was provided.  Labs demonstrate negative troponin at this time.  Blood sugar is elevated though no evidence of concurrent acidosis.  Chest x-ray unremarkable.  Signs and symptoms not consistent with aortic dissection or acute pulmonary embolism.  Given my high suspicion for cardiac etiology, patient was started on heparin here in the ED.  She will be admitted to the telemetry unit under the care of Dr. Neumann for further treatment and care.  Questions answered prior to admission.      Diagnosis:    ICD-10-CM    1. Unstable angina (H) I20.0 Hemoglobin A1c   2. Hyperglycemia R73.9        Disposition:  Admitted by Dr. Neumann to a Cardiac Telemetry bed    Jeffrey Abreu  6/9/2019   [facility] EMERGENCY DEPARTMENT  Scribe Disclosure:  I, Jeffrey Abreu, am serving as a scribe at 5:26 PM on 6/9/2019 to document services personally performed by Marcos Pierce MD based on my observations and the provider's statements to me.        Marcos Pierce MD  06/10/19 0013

## 2019-06-09 NOTE — H&P
Hospitalist Admission Note([facility]/Counts include 234 beds at the [facility])    Name: Princess Edgar    MRN: [id number]          YOB: 1945    Age: 74 year old  Date of admission: 6/9/2019  Primary care provider: Madison Langley              Assessment:       Brief summary of admission assessment:Princess Edgar is a 74 year old  female with a significant past medical history of known coronary artery disease status post bypass graft in 2015 who presents with chest pain which started last Friday.  Her chest pain was exertional in nature associated with activities.  Emergency department evaluation revealed normal sinus rhythm with nonspecific EKG changes but without ST segment elevation.  Troponin was negative.  Due to patient's history of coronary artery disease and exertional chest pain of new onset, patient was started on heparin for suspected acute coronary syndrome.    Admission diagnoses:      #1.  New-onset angina concerning for acute coronary syndrome   #2.  Known coronary artery disease status post one-vessel bypass in 2015: Last stress test was in 2016 with Lexiscan which was unremarkable for reversible ischemia.  EF was 78%.  Patient is not following with cardiology service.  Patient goes to primary care clinic with [employer] system    #3.  Type 2 diabetes on oral metformin PTA    #4.  Hypertension on losartan, metoprolol    Comorbid medical conditions:       Coronary artery disease involving native coronary artery of native heart without angina pectoris (HRC) 9/17/2015     Depression, major, severe recurrence (HRC) 7/25/2013     Diabetes mellitus type 2, controlled (HRC) 9/11/2015     Essential hypertension (HRC) 9/17/2015     VIANNEY (generalized anxiety disorder) (HRC) 9/17/2015     Gastroesophageal reflux disease without esophagitis 9/17/2015     Glaucoma (ACG) 6-2007   verónica 5 yrs ago     History of MRSA infection 9/17/2015     Hyperlipidemia (HRC) 9/17/2015     Hypertension (ACG)     Left renal mass 6/15/2016      Low bone mass 11/8/2018     Microalbuminuria 11/29/2016     Mild intermittent asthma without complication ([facility]) 9/17/2015     Obesity 5/17/2011   LW Modifier: BMI 36.0 (May 2011)     S/P CABG (coronary artery bypass graft) ([facility]) 9/17/2015              Plan/MDM:       > Admission Status: Will admit patient to hospitalist service as inpatient as patient likely need over two mid night stays in the hospital.     >Care plan:    --Continue aspirin, heparin, beta-blocker, telemetry admission, 2D echo, cardiology consultation and close monitoring of pain troponins.   --Continue home medications, sliding scale insulin.  PRN medication for chest pain    >Supportive care:Pain management: acetominophen, anxiolytics and oral narcotics  Respiratory therapy    >Diet:Diet advanced    >Activity:Advance activity as tolerated    >Education/Counseling :Discussed treatment plan with the patient    >Consults:Inpatient consult with cardiology    >VTE prophylactic measures:prophylaxis against venous thromboembolism    >Therapies:none       >Additional orders:    --Care plan discussed with the patient/family and agreed to care plan   --Patient will be transferred to care of hospitalist attending for further evaluation and management as appropriate   --Old medical orders reviewed   --imaging result independently reviewed by me     (See orders placed for this visit by me )     - Home medication reviewed and will be continued as appropriate once pharmacy reconciliation is completed         Code Status/Disposition:     >Code Status:Full Code      >Disposition:anticipate discharge to home and Anticipate discharge in 3 days        Disclaimer: This note consists of symbols derived from keyboarding, dictation and/or voice recognition software. As a result, there may be errors in the script that have gone undetected. Please consider this when interpreting information found in this chart.             Chief Complaint:     Chest pain     History is  obtained from the patient          History of Present Illness:      This patient is a 74 year old  female with a significant past medical history of coronary artery disease who presents with the following condition requiring a hospital admission:    Exertional chest pain concerning for acute coronary syndrome  Patient is 74-year-old female with history of bypass graft, diabetes who presents with complaint of chest pain.  She developed midsternal chest discomfort which radiated to her throat whenever she exerts herself.  Her chest pain is better with rest.  It is been on and off since Friday and she used her old nitroglycerin tablet which improved her pain.  Patient denies any fever or chills.  No cough.  No shortness of breath.  Patient is normal following with cardiology team and her last stress test was 3 years ago which was unremarkable.  Please review care everywhere for her previous stress testing         Past Medical History:     Past Medical History:   Diagnosis Date     Anxiety      Arthritis      Diabetes (H)      Glaucoma (increased eye pressure)      Hypertension             Past Surgical History:     Past Surgical History:   Procedure Laterality Date     CARDIAC SURGERY       EYE SURGERY       ORTHOPEDIC SURGERY               Social History:     Social History     Tobacco Use     Smoking status: Not on file     Smokeless tobacco: Never Used   Substance Use Topics     Alcohol use: Yes             Family History:   Reviewed and non contributory        Allergies:   No Known Allergies          Medications:        Prior to Admission medications    Medication Sig Last Dose Taking? Auth Provider   Fexofenadine HCl (ALLEGRA PO)    Reported, Patient   LORAZEPAM PO    Reported, Patient   losartan-hydrochlorothiazide (HYZAAR) 100-12.5 MG per tablet Take 1 tablet by mouth daily   Reported, Patient   metoprolol succinate (TOPROL-XL) 50 MG 24 hr tablet Take 50 mg by mouth daily   Reported, Patient   RANITIDINE HCL PO  Take 150 mg by mouth   Reported, Patient   SIMVASTATIN PO Take 20 mg by mouth   Reported, Patient   venlafaxine (EFFEXOR-ER) 225 MG TB24 24 hr tablet Take 225 mg by mouth daily (with breakfast)   Reported, Patient          Review of Systems:     A Comprehensive greater than 10 system review of systems was carried out.  Pertinent positives and negatives are noted above in HPI.  Otherwise negative for contributory information.           Physical Exam:     Vital signs were reviewed    Temp:  [97  F (36.1  C)] 97  F (36.1  C)  Pulse:  [] 88  Heart Rate:  [82-86] 82  Resp:  [20-28] 21  BP: (110-153)/(67-73) 153/73  SpO2:  [95 %-97 %] 96 %        GEN: awake, alert, cooperative, no apparent distress, oriented x 3    NECK:Supple ,no mass or thyromegaly     HEENT:  Normocephalic/atraumatic, no scleral icterus, no nasal discharge, mouth moist.    CV:  Regular rate and rhythm, no murmur or JVD.  S1 + S2 noted, no S3 or S4.    LUNGS:  Clear to auscultation bilaterally without rales/rhonchi/wheezing/retractions.  Symmetric chest rise on inhalation noted.    ABD:  Active bowel sounds, soft, non-tender/non-distended.  No rebound/guarding/rigidity.    EXT:  No edema.  No cyanosis.  No joint synovitis noted.Lower extremity pulses are normal bilaterally and     LGS: No cervical or axillary lymphadenopathy     SKIN:  Dry to touch, warm ,no exanthems noted in the visualized areas.    Neurologic:Grossly intact,non focal . No acute focal neurologic deficit     Psychaitric exam: Mood and affect normal     Additional significant  Findings:               Data:       All laboratory and imaging data in the past 24 hours reviewed     Results for orders placed or performed during the hospital encounter of 06/09/19   Chest XR,  PA & LAT    Narrative    XR CHEST 2 VW 6/9/2019 6:04 PM    HISTORY: Pain.    COMPARISON: None.      Impression    IMPRESSION: The lungs are clear. No focal pulmonary opacities. Heart  and mediastinum are  unremarkable. No acute cardiopulmonary  abnormalities. Midline sternal wires in place.     BARBI LUCIA MD   CBC with platelets differential   Result Value Ref Range    WBC 8.7 4.0 - 11.0 10e9/L    RBC Count 4.81 3.8 - 5.2 10e12/L    Hemoglobin 14.4 11.7 - 15.7 g/dL    Hematocrit 46.5 35.0 - 47.0 %    MCV 97 78 - 100 fl    MCH 29.9 26.5 - 33.0 pg    MCHC 31.0 (L) 31.5 - 36.5 g/dL    RDW 13.2 10.0 - 15.0 %    Platelet Count 332 150 - 450 10e9/L    Diff Method Automated Method     % Neutrophils 58.3 %    % Lymphocytes 23.3 %    % Monocytes 10.0 %    % Eosinophils 7.3 %    % Basophils 0.8 %    % Immature Granulocytes 0.3 %    Nucleated RBCs 0 0 /100    Absolute Neutrophil 5.1 1.6 - 8.3 10e9/L    Absolute Lymphocytes 2.0 0.8 - 5.3 10e9/L    Absolute Monocytes 0.9 0.0 - 1.3 10e9/L    Absolute Eosinophils 0.6 0.0 - 0.7 10e9/L    Absolute Basophils 0.1 0.0 - 0.2 10e9/L    Abs Immature Granulocytes 0.0 0 - 0.4 10e9/L    Absolute Nucleated RBC 0.0    Basic metabolic panel   Result Value Ref Range    Sodium 139 133 - 144 mmol/L    Potassium 4.0 3.4 - 5.3 mmol/L    Chloride 106 94 - 109 mmol/L    Carbon Dioxide 27 20 - 32 mmol/L    Anion Gap 6 3 - 14 mmol/L    Glucose 334 (H) 70 - 99 mg/dL    Urea Nitrogen 14 7 - 30 mg/dL    Creatinine 1.03 0.52 - 1.04 mg/dL    GFR Estimate 54 (L) >60 mL/min/[1.73_m2]    GFR Estimate If Black 63 >60 mL/min/[1.73_m2]    Calcium 8.7 8.5 - 10.1 mg/dL   Troponin I   Result Value Ref Range    Troponin I ES <0.015 0.000 - 0.045 ug/L   EKG 12 lead   Result Value Ref Range    Interpretation ECG Click View Image link to view waveform and result             Recent Results (from the past 48 hour(s))   Chest XR,  PA & LAT    Narrative    XR CHEST 2 VW 6/9/2019 6:04 PM    HISTORY: Pain.    COMPARISON: None.      Impression    IMPRESSION: The lungs are clear. No focal pulmonary opacities. Heart  and mediastinum are unremarkable. No acute cardiopulmonary  abnormalities. Midline sternal wires in place.      BARBI LUCIA MD       EKG results: Normal sinus rhythm with nonspecific ST segment abnormalities.       All imaging studies reviewed by me.         Patient`s old medical records reviewed and case discussed with the ED physician.    ED course-Reviewed

## 2019-06-09 NOTE — PHARMACY-ADMISSION MEDICATION HISTORY
Admission medication history interview status for this patient is complete. See [facility] admission navigator for allergy information, prior to admission medications and immunization status.     Medication history interview source(s):Patient  Medication history resources (including written lists, pill bottles, clinic record):None  Primary pharmacy: [place], [place] ([address])    Changes made to PTA medication list:  Added: all  Deleted: losartan-hydrochlorothiazide (takes losartan)  Changed: venlafaxine (decrease from 225 mg to 175 mg), metoprolol (decrease from 50 mg daily to 12.5 mg twice daily)    Actions taken by pharmacist (provider contacted, etc):None     Additional medication history information:None    Medication reconciliation/reorder completed by provider prior to medication history? No    Do you take OTC medications (eg tylenol, ibuprofen, fish oil, eye/ear drops, etc)? Y     Prior to Admission medications    Medication Sig Last Dose Taking? Auth Provider   amLODIPine (NORVASC) 5 MG tablet Take 5 mg by mouth daily 6/9/2019 at 1100 Yes Reported, Patient   aspirin 81 MG EC tablet Take 81 mg by mouth daily 6/9/2019 at 1100 Yes Reported, Patient   diclofenac (VOLTAREN) 1 % topical gel Place 2 g onto the skin 4 times daily Past Week Yes Reported, Patient   fexofenadine-pseudoePHEDrine (ALLEGRA-D 24) 180-240 MG 24 hr tablet Take 1 tablet by mouth daily Past Week Yes Reported, Patient   fluticasone (FLONASE) 50 MCG/ACT nasal spray Spray 2 sprays into both nostrils daily 6/9/2019 at 1100 Yes Reported, Patient   LORAZEPAM PO Take 0.5 mg by mouth nightly as needed  6/7/2019 at PM Yes Reported, Patient   losartan (COZAAR) 50 MG tablet Take 50 mg by mouth daily 6/9/2019 at 1100 Yes Reported, Patient   metFORMIN (GLUCOPHAGE-XR) 500 MG 24 hr tablet Take 2,000 mg by mouth daily (with dinner) 6/8/2019 at 1600 Yes Reported, Patient   metoprolol tartrate (LOPRESSOR) 25 MG tablet Take 12.5 mg by mouth 2 times daily  6/9/2019 at 1100 Yes Reported, Patient   olopatadine (PATADAY) 0.2 % ophthalmic solution Place 1 drop into both eyes daily as needed Past Month Yes Reported, Patient   RANITIDINE HCL PO Take 150 mg by mouth 2 times daily  6/9/2019 at 1100 Yes Reported, Patient   SIMVASTATIN PO Take 20 mg by mouth At Bedtime  6/9/2019 at 0200 Yes Reported, Patient   traZODone (DESYREL) 100 MG tablet Take 50-75 mg by mouth At Bedtime 6/8/2019 at PM Yes Reported, Patient   venlafaxine (EFFEXOR-ER) 225 MG TB24 24 hr tablet Take 150 mg by mouth daily (with breakfast)  6/9/2019 at 1100 Yes Reported, Patient   albuterol (PROAIR HFA/PROVENTIL HFA/VENTOLIN HFA) 108 (90 Base) MCG/ACT inhaler Inhale 2 puffs into the lungs every 4 hours as needed for shortness of breath / dyspnea or wheezing Unknown  Reported, Patient   diphenoxylate-atropine (LOMOTIL) 2.5-0.025 MG tablet Take 1 tablet by mouth 4 times daily as needed for diarrhea Unknown  Reported, Patient

## 2019-06-10 ENCOUNTER — APPOINTMENT (OUTPATIENT)
Dept: CARDIOLOGY | Facility: CLINIC | Age: 74
DRG: 287 | End: 2019-06-10
Attending: INTERNAL MEDICINE
Payer: COMMERCIAL

## 2019-06-10 LAB
ANION GAP SERPL CALCULATED.3IONS-SCNC: 4 MMOL/L (ref 3–14)
BUN SERPL-MCNC: 14 MG/DL (ref 7–30)
CALCIUM SERPL-MCNC: 8.3 MG/DL (ref 8.5–10.1)
CHLORIDE SERPL-SCNC: 108 MMOL/L (ref 94–109)
CO2 SERPL-SCNC: 28 MMOL/L (ref 20–32)
CREAT SERPL-MCNC: 0.88 MG/DL (ref 0.52–1.04)
ERYTHROCYTE [DISTWIDTH] IN BLOOD BY AUTOMATED COUNT: 13.2 % (ref 10–15)
GFR SERPL CREATININE-BSD FRML MDRD: 65 ML/MIN/{1.73_M2}
GLUCOSE BLDC GLUCOMTR-MCNC: 105 MG/DL (ref 70–99)
GLUCOSE BLDC GLUCOMTR-MCNC: 234 MG/DL (ref 70–99)
GLUCOSE BLDC GLUCOMTR-MCNC: 79 MG/DL (ref 70–99)
GLUCOSE BLDC GLUCOMTR-MCNC: 93 MG/DL (ref 70–99)
GLUCOSE BLDC GLUCOMTR-MCNC: 99 MG/DL (ref 70–99)
GLUCOSE SERPL-MCNC: 141 MG/DL (ref 70–99)
HCT VFR BLD AUTO: 41.4 % (ref 35–47)
HGB BLD-MCNC: 12.9 G/DL (ref 11.7–15.7)
INTERPRETATION ECG - MUSE: NORMAL
LMWH PPP CHRO-ACNC: 0.27 IU/ML
LMWH PPP CHRO-ACNC: 0.37 IU/ML
MAGNESIUM SERPL-MCNC: 1.9 MG/DL (ref 1.6–2.3)
MCH RBC QN AUTO: 30.2 PG (ref 26.5–33)
MCHC RBC AUTO-ENTMCNC: 31.2 G/DL (ref 31.5–36.5)
MCV RBC AUTO: 97 FL (ref 78–100)
PLATELET # BLD AUTO: 277 10E9/L (ref 150–450)
POTASSIUM SERPL-SCNC: 4.3 MMOL/L (ref 3.4–5.3)
RBC # BLD AUTO: 4.27 10E12/L (ref 3.8–5.2)
SODIUM SERPL-SCNC: 140 MMOL/L (ref 133–144)
TROPONIN I SERPL-MCNC: <0.015 UG/L (ref 0–0.04)
TROPONIN I SERPL-MCNC: <0.015 UG/L (ref 0–0.04)
WBC # BLD AUTO: 8.3 10E9/L (ref 4–11)

## 2019-06-10 PROCEDURE — 25000132 ZZH RX MED GY IP 250 OP 250 PS 637: Performed by: INTERNAL MEDICINE

## 2019-06-10 PROCEDURE — 12000000 ZZH R&B MED SURG/OB

## 2019-06-10 PROCEDURE — 93306 TTE W/DOPPLER COMPLETE: CPT

## 2019-06-10 PROCEDURE — 25800030 ZZH RX IP 258 OP 636: Performed by: PHYSICIAN ASSISTANT

## 2019-06-10 PROCEDURE — 85027 COMPLETE CBC AUTOMATED: CPT | Performed by: INTERNAL MEDICINE

## 2019-06-10 PROCEDURE — 84484 ASSAY OF TROPONIN QUANT: CPT | Performed by: INTERNAL MEDICINE

## 2019-06-10 PROCEDURE — 36415 COLL VENOUS BLD VENIPUNCTURE: CPT | Performed by: INTERNAL MEDICINE

## 2019-06-10 PROCEDURE — 00000146 ZZHCL STATISTIC GLUCOSE BY METER IP

## 2019-06-10 PROCEDURE — 25000132 ZZH RX MED GY IP 250 OP 250 PS 637: Performed by: PHYSICIAN ASSISTANT

## 2019-06-10 PROCEDURE — 99232 SBSQ HOSP IP/OBS MODERATE 35: CPT | Performed by: HOSPITALIST

## 2019-06-10 PROCEDURE — 93306 TTE W/DOPPLER COMPLETE: CPT | Mod: 26 | Performed by: INTERNAL MEDICINE

## 2019-06-10 PROCEDURE — 25000125 ZZHC RX 250: Performed by: INTERNAL MEDICINE

## 2019-06-10 PROCEDURE — 99223 1ST HOSP IP/OBS HIGH 75: CPT | Mod: 25 | Performed by: INTERNAL MEDICINE

## 2019-06-10 PROCEDURE — 85520 HEPARIN ASSAY: CPT | Performed by: INTERNAL MEDICINE

## 2019-06-10 PROCEDURE — 25000128 H RX IP 250 OP 636: Performed by: INTERNAL MEDICINE

## 2019-06-10 PROCEDURE — 99207 ZZC CDG-MDM COMPONENT: MEETS LOW - DOWN CODED: CPT | Performed by: HOSPITALIST

## 2019-06-10 PROCEDURE — 80048 BASIC METABOLIC PNL TOTAL CA: CPT | Performed by: INTERNAL MEDICINE

## 2019-06-10 PROCEDURE — 83735 ASSAY OF MAGNESIUM: CPT | Performed by: INTERNAL MEDICINE

## 2019-06-10 PROCEDURE — 25000132 ZZH RX MED GY IP 250 OP 250 PS 637: Performed by: HOSPITALIST

## 2019-06-10 RX ORDER — ATORVASTATIN CALCIUM 40 MG/1
40 TABLET, FILM COATED ORAL EVERY EVENING
Status: DISCONTINUED | OUTPATIENT
Start: 2019-06-10 | End: 2019-06-12 | Stop reason: HOSPADM

## 2019-06-10 RX ORDER — NITROGLYCERIN 20 MG/100ML
.07-2 INJECTION INTRAVENOUS CONTINUOUS PRN
Status: DISCONTINUED | OUTPATIENT
Start: 2019-06-10 | End: 2019-06-11

## 2019-06-10 RX ORDER — TIROFIBAN HYDROCHLORIDE 50 UG/ML
0.15 INJECTION INTRAVENOUS CONTINUOUS PRN
Status: DISCONTINUED | OUTPATIENT
Start: 2019-06-10 | End: 2019-06-11 | Stop reason: HOSPADM

## 2019-06-10 RX ORDER — TIROFIBAN HYDROCHLORIDE 50 UG/ML
0.15 INJECTION INTRAVENOUS CONTINUOUS PRN
Status: DISCONTINUED | OUTPATIENT
Start: 2019-06-10 | End: 2019-06-11

## 2019-06-10 RX ORDER — EPTIFIBATIDE 2 MG/ML
180 INJECTION, SOLUTION INTRAVENOUS EVERY 10 MIN PRN
Status: DISCONTINUED | OUTPATIENT
Start: 2019-06-10 | End: 2019-06-11

## 2019-06-10 RX ORDER — DOPAMINE HYDROCHLORIDE 160 MG/100ML
2-20 INJECTION, SOLUTION INTRAVENOUS CONTINUOUS PRN
Status: DISCONTINUED | OUTPATIENT
Start: 2019-06-10 | End: 2019-06-11

## 2019-06-10 RX ORDER — DOBUTAMINE HYDROCHLORIDE 200 MG/100ML
2-20 INJECTION INTRAVENOUS CONTINUOUS PRN
Status: DISCONTINUED | OUTPATIENT
Start: 2019-06-10 | End: 2019-06-11

## 2019-06-10 RX ORDER — ARGATROBAN 1 MG/ML
350 INJECTION, SOLUTION INTRAVENOUS
Status: DISCONTINUED | OUTPATIENT
Start: 2019-06-10 | End: 2019-06-11

## 2019-06-10 RX ORDER — LORAZEPAM 2 MG/ML
0.5 INJECTION INTRAMUSCULAR
Status: DISCONTINUED | OUTPATIENT
Start: 2019-06-10 | End: 2019-06-11

## 2019-06-10 RX ORDER — ASPIRIN 81 MG/1
243 TABLET, CHEWABLE ORAL ONCE
Status: COMPLETED | OUTPATIENT
Start: 2019-06-10 | End: 2019-06-10

## 2019-06-10 RX ORDER — EPTIFIBATIDE 2 MG/ML
2 INJECTION, SOLUTION INTRAVENOUS CONTINUOUS PRN
Status: DISCONTINUED | OUTPATIENT
Start: 2019-06-10 | End: 2019-06-11

## 2019-06-10 RX ORDER — NOREPINEPHRINE BITARTRATE 0.06 MG/ML
.03-.4 INJECTION, SOLUTION INTRAVENOUS CONTINUOUS PRN
Status: DISCONTINUED | OUTPATIENT
Start: 2019-06-10 | End: 2019-06-11

## 2019-06-10 RX ORDER — HEPARIN SODIUM 1000 [USP'U]/ML
INJECTION, SOLUTION INTRAVENOUS; SUBCUTANEOUS
Status: DISCONTINUED
Start: 2019-06-10 | End: 2019-06-10 | Stop reason: WASHOUT

## 2019-06-10 RX ORDER — LIDOCAINE 40 MG/G
CREAM TOPICAL
Status: DISCONTINUED | OUTPATIENT
Start: 2019-06-10 | End: 2019-06-11

## 2019-06-10 RX ORDER — ARGATROBAN 1 MG/ML
150 INJECTION, SOLUTION INTRAVENOUS
Status: DISCONTINUED | OUTPATIENT
Start: 2019-06-10 | End: 2019-06-11

## 2019-06-10 RX ORDER — VERAPAMIL HYDROCHLORIDE 2.5 MG/ML
INJECTION, SOLUTION INTRAVENOUS
Status: DISCONTINUED
Start: 2019-06-10 | End: 2019-06-10 | Stop reason: WASHOUT

## 2019-06-10 RX ORDER — POTASSIUM CHLORIDE 1500 MG/1
20 TABLET, EXTENDED RELEASE ORAL
Status: DISCONTINUED | OUTPATIENT
Start: 2019-06-10 | End: 2019-06-11

## 2019-06-10 RX ORDER — SODIUM CHLORIDE 9 MG/ML
INJECTION, SOLUTION INTRAVENOUS CONTINUOUS
Status: DISCONTINUED | OUTPATIENT
Start: 2019-06-10 | End: 2019-06-11

## 2019-06-10 RX ORDER — LORATADINE 10 MG/1
10 TABLET ORAL DAILY
Status: DISCONTINUED | OUTPATIENT
Start: 2019-06-10 | End: 2019-06-12 | Stop reason: HOSPADM

## 2019-06-10 RX ORDER — NITROGLYCERIN 5 MG/ML
VIAL (ML) INTRAVENOUS
Status: DISCONTINUED
Start: 2019-06-10 | End: 2019-06-10 | Stop reason: WASHOUT

## 2019-06-10 RX ORDER — FENTANYL CITRATE 50 UG/ML
INJECTION, SOLUTION INTRAMUSCULAR; INTRAVENOUS
Status: DISCONTINUED
Start: 2019-06-10 | End: 2019-06-10 | Stop reason: WASHOUT

## 2019-06-10 RX ORDER — LORAZEPAM 0.5 MG/1
0.5 TABLET ORAL
Status: DISCONTINUED | OUTPATIENT
Start: 2019-06-10 | End: 2019-06-11

## 2019-06-10 RX ORDER — LOSARTAN POTASSIUM 100 MG/1
100 TABLET ORAL DAILY
Status: DISCONTINUED | OUTPATIENT
Start: 2019-06-10 | End: 2019-06-12 | Stop reason: HOSPADM

## 2019-06-10 RX ADMIN — VENLAFAXINE HYDROCHLORIDE 150 MG: 150 CAPSULE, EXTENDED RELEASE ORAL at 08:28

## 2019-06-10 RX ADMIN — ASPIRIN 81 MG: 81 TABLET, COATED ORAL at 08:28

## 2019-06-10 RX ADMIN — LOSARTAN POTASSIUM 100 MG: 100 TABLET ORAL at 10:20

## 2019-06-10 RX ADMIN — Medication 12.5 MG: at 21:06

## 2019-06-10 RX ADMIN — RANITIDINE 150 MG: 150 TABLET ORAL at 08:28

## 2019-06-10 RX ADMIN — SODIUM CHLORIDE: 9 INJECTION, SOLUTION INTRAVENOUS at 11:35

## 2019-06-10 RX ADMIN — Medication 2 G: at 19:50

## 2019-06-10 RX ADMIN — FLUTICASONE PROPIONATE 2 SPRAY: 50 SPRAY, METERED NASAL at 10:20

## 2019-06-10 RX ADMIN — ASPIRIN 81 MG 243 MG: 81 TABLET ORAL at 11:25

## 2019-06-10 RX ADMIN — LORATADINE 10 MG: 10 TABLET ORAL at 10:20

## 2019-06-10 RX ADMIN — Medication 1 LOZENGE: at 04:57

## 2019-06-10 RX ADMIN — AMLODIPINE BESYLATE 5 MG: 5 TABLET ORAL at 08:28

## 2019-06-10 RX ADMIN — HEPARIN SODIUM 700 UNITS/HR: 10000 INJECTION, SOLUTION INTRAVENOUS at 03:03

## 2019-06-10 RX ADMIN — Medication 12.5 MG: at 08:28

## 2019-06-10 RX ADMIN — RANITIDINE 150 MG: 150 TABLET ORAL at 21:06

## 2019-06-10 RX ADMIN — LORAZEPAM 0.5 MG: 0.5 TABLET ORAL at 21:08

## 2019-06-10 RX ADMIN — Medication 75 MG: at 21:06

## 2019-06-10 RX ADMIN — ATORVASTATIN CALCIUM 40 MG: 40 TABLET, FILM COATED ORAL at 19:49

## 2019-06-10 ASSESSMENT — ACTIVITIES OF DAILY LIVING (ADL)
FALL_HISTORY_WITHIN_LAST_SIX_MONTHS: NO
ADLS_ACUITY_SCORE: 11
RETIRED_EATING: 0-->INDEPENDENT
RETIRED_COMMUNICATION: 0-->UNDERSTANDS/COMMUNICATES WITHOUT DIFFICULTY
AMBULATION: 0-->INDEPENDENT
TOILETING: 0-->INDEPENDENT
ADLS_ACUITY_SCORE: 11
TRANSFERRING: 0-->INDEPENDENT
ADLS_ACUITY_SCORE: 11
SWALLOWING: 0-->SWALLOWS FOODS/LIQUIDS WITHOUT DIFFICULTY
COGNITION: 0 - NO COGNITION ISSUES REPORTED
ADLS_ACUITY_SCORE: 17
BATHING: 0-->INDEPENDENT
ADLS_ACUITY_SCORE: 17
ADLS_ACUITY_SCORE: 11
DRESS: 0-->INDEPENDENT

## 2019-06-10 ASSESSMENT — MIFFLIN-ST. JEOR: SCORE: 1370.45

## 2019-06-10 NOTE — ED NOTES
"[facility]  ED Nurse Handoff Report    Princess Edgar is a 74 year old female   ED Chief complaint: Chest Pain  . ED Diagnosis:   Final diagnoses:   Unstable angina (H)   Hyperglycemia     Allergies: No Known Allergies    Code Status: Full Code  Activity level - Baseline/Home:  Independent. Activity Level - Current:   Stand by Assist. Lift room needed: No. Bariatric: No   Needed: No   Isolation: No. Infection: Not Applicable.     Vital Signs:   Vitals:    06/09/19 1707 06/09/19 1750 06/09/19 1755 06/09/19 1854   BP: 110/67 153/73     Pulse: 112 88     Resp: 20 28 21    Temp: 97  F (36.1  C)      TempSrc: Temporal      SpO2: 97% 95% 96%    Weight:    85.9 kg (189 lb 6 oz)   Height:    1.651 m (5' 5\")       Cardiac Rhythm:  ,   Cardiac  Cardiac Rhythm: Normal sinus rhythm  Pain level: 0-10 Pain Scale: 1  Patient confused: No. Patient Falls Risk: Yes.   Elimination Status: Has voided   Patient Report - Initial Complaint:Chest pain started Friday. Patient states pain radiated to neck and shoulders. Patient states pain is worse when she does any activity.   ABC intact alert and no distress. .   Focused Assessment: Cardiac - Cardiac WDL: -WDL except   Review of Systems (Cardiac) - Cardiovascular Symptoms/Conditions: chest pain   Chest Pain Assessment - Precipitating Factors: activity  Chest Pain Reproducible?: No   Cardiac Monitoring - EKG Monitoring: Yes  Cardiac Regularity: Regular  Cardiac Rhythm: NSR   Tests Performed: Labs, Xray. Abnormal Results:   Labs Ordered and Resulted from Time of ED Arrival Up to the Time of Departure from the ED   CBC WITH PLATELETS DIFFERENTIAL - Abnormal; Notable for the following components:       Result Value    MCHC 31.0 (*)     All other components within normal limits   BASIC METABOLIC PANEL - Abnormal; Notable for the following components:    Glucose 334 (*)     GFR Estimate 54 (*)     All other components within normal limits   TROPONIN I   PLATELETS " MONITORED PER HEPARIN TREATMENT PROTOCOL (FOR MEANINGFUL USE   PULSE OXIMETRY NURSING   CARDIAC CONTINUOUS MONITORING   PERIPHERAL IV CATHETER   PATIENT CARE ORDER   CARDIAC CONTINUOUS MONITORING   PULSE OXIMETRY NURSING   PERIPHERAL IV CATHETER   PATIENT CARE ORDER   MEASURE WEIGHT   NOTIFY PHYSICIAN   NOTIFY PHYSICIAN     Chest XR,  PA & LAT   Final Result   IMPRESSION: The lungs are clear. No focal pulmonary opacities. Heart   and mediastinum are unremarkable. No acute cardiopulmonary   abnormalities. Midline sternal wires in place.       BARBI LUCIA MD      .   Treatments provided: See MAR  Family Comments: Daughter at bedside and will be present upon admission  OBS brochure/video discussed/provided to patient:  Yes  ED Medications:   Medications   heparin infusion 25,000 units in 0.45% NaCl 250 mL (has no administration in time range)   heparin Loading Dose bolus dose from infusion pump 4,116 Units (has no administration in time range)   aspirin (ASA) chewable tablet 324 mg (324 mg Oral Given 6/9/19 3748)     Drips infusing:  Yes  For the majority of the shift, the patient's behavior Green. Interventions performed were N/A.     Severe Sepsis OR Septic Shock Diagnosis Present: No      ED Nurse Name/Phone Number: Will Kendrick,   7:01 PM    RECEIVING UNIT ED HANDOFF REVIEW    Above ED Nurse Handoff Report was reviewed: Yes  Reviewed by: Gustavo Zuleta on June 9, 2019 at 7:15 PM

## 2019-06-10 NOTE — PROGRESS NOTES
[facility]    Hospitalist Progress Note  Name: Princess Edgar    MRN: [id number]  Provider:  Nick East DO, MPH  Date of Service: 06/10/2019    Summary of Stay: Princess Edgar is a 74 year old female with a history of hypertension, diabetes, depression, anxiety, GERD, hyperlipidemia, and coronary artery disease status post CABG about 20 years ago admitted on 6/9/2019 with exertional chest pain concerning for unstable angina.    Problem List:   1. Unstable angina: Discussed with cardiology.  Plan for coronary angina gram today.  Continue aspirin, atorvastatin, metoprolol, and heparin drip.  Stop the scheduled pseudoephedrine that she takes with her loratadine daily.  2. Hypertension: Blood pressure reasonable.  Continue metoprolol, losartan, amlodipine.  3. Diabetes: Hold prior to admission metformin with contrast exposure planned.  Continue high sliding scale insulin.  Hemoglobin A1c at goal at 6.2.    DVT Prophylaxis: Heparin   Code Status: Full Code  Disposition: Expected discharge in 1 days to home. Goals prior to discharge include coronary angiogram.   Incidental Findings: None.  Family updated today: No     Interval History   Assumed care from previous hospitalist. The history was fully reviewed.  The patient reports doing well. No chest pain or shortness of breath. No nausea, vomiting, diarrhea, constipation. No fevers. No other specific complaints identified.     -Data reviewed today: I personally reviewed all new labs and imaging results over the last 24 hours.     Physical Exam   Temp: 97  F (36.1  C) Temp src: Oral BP: 156/65 Pulse: 73 Heart Rate: 66 Resp: 16 SpO2: 93 % O2 Device: None (Room air)    Vitals:    06/09/19 1854 06/09/19 1951 06/10/19 0513   Weight: 85.9 kg (189 lb 6 oz) 86.3 kg (190 lb 3.2 oz) 86 kg (189 lb 8 oz)     Vital Signs with Ranges  Temp:  [96.4  F (35.8  C)-97.5  F (36.4  C)] 97  F (36.1  C)  Pulse:  [] 73  Heart Rate:  [66-86] 66  Resp:  [16-28] 16  BP:  (110-169)/(50-84) 156/65  SpO2:  [93 %-98 %] 93 %  I/O last 3 completed shifts:  In: -   Out: 200 [Urine:200]    GENERAL: No apparent distress. Awake, alert, and fully oriented.  HEENT: Normocephalic, atraumatic. Extraocular movements intact.  CARDIOVASCULAR: Regular rate and rhythm without murmurs or rubs. No S3.  PULMONARY: Clear bilaterally.  GASTROINTESTINAL: Soft, non-tender, non-distended. Bowel sounds normoactive.   EXTREMITIES: No cyanosis or clubbing. No edema.  NEUROLOGICAL: CN 2-12 grossly intact, no focal neurological deficits.  DERMATOLOGICAL: No rash, ulcer, bruising, nor jaundice.     Medications     - MEDICATION INSTRUCTIONS -       - MEDICATION INSTRUCTIONS -       - MEDICATION INSTRUCTIONS -       HEParin 700 Units/hr (06/10/19 0835)     - MEDICATION INSTRUCTIONS -       - MEDICATION INSTRUCTIONS -       - MEDICATION INSTRUCTIONS -       ACE/ARB/ARNI NOT PRESCRIBED         amLODIPine  5 mg Oral Daily     aspirin  81 mg Oral Daily     atorvastatin  40 mg Oral QPM     fluticasone  2 spray Both Nostrils Daily     insulin aspart  1-7 Units Subcutaneous At Bedtime     insulin aspart  1-10 Units Subcutaneous TID AC     loratadine  10 mg Oral Daily     losartan  100 mg Oral Daily     metoprolol tartrate  12.5 mg Oral BID     ranitidine  150 mg Oral BID     sodium chloride (PF)  3 mL Intracatheter Q8H     traZODone  50-75 mg Oral At Bedtime     venlafaxine  150 mg Oral Daily with breakfast     Data     Laboratory:  Recent Labs   Lab 06/10/19  0417 06/09/19 2002 06/09/19  1723   WBC 8.3 7.8 8.7   HGB 12.9 13.9 14.4   HCT 41.4 45.6 46.5   MCV 97 98 97    304 332     Recent Labs   Lab 06/10/19  0419 06/09/19  1723    139   POTASSIUM 4.3 4.0   CHLORIDE 108 106   CO2 28 27   ANIONGAP 4 6   * 334*   BUN 14 14   CR 0.88 1.03   GFRESTIMATED 65 54*   GFRESTBLACK 76 63   DORIE 8.3* 8.7     No results for input(s): CHOL, HDL, LDL, TRIG, CHOLHDLRATIO in the last 168 hours.  Recent Labs   Lab  06/10/19  0419 06/10/19  0023 06/09/19 2002   TROPI <0.015 <0.015 <0.015     No results for input(s): CULT in the last 168 hours.    Imaging:  Recent Results (from the past 24 hour(s))   Chest XR,  PA & LAT    Narrative    XR CHEST 2 VW 6/9/2019 6:04 PM    HISTORY: Pain.    COMPARISON: None.      Impression    IMPRESSION: The lungs are clear. No focal pulmonary opacities. Heart  and mediastinum are unremarkable. No acute cardiopulmonary  abnormalities. Midline sternal wires in place.     MD Nick CABALLERO DO MPH  [facility] Hospitalist  [address] [place] [address]  Pager: [phone]  06/10/2019

## 2019-06-10 NOTE — PRE-PROCEDURE
I have examined the patient, reviewed the history, medications and pre procedural tests. She has unstable angina in the setting of known CAD, sp old stent followed by LIMA to LAD single CAB surgery 1999.  I have explained to the patient the risks of death, MI, stroke, hematoma, possible urgent bypass surgery for failed PCI, use of stents, thienopyridine agents, possible peripheral vascular complications, arrhythmia, the use of FFR in clinical decision-making and alternative of medical therapy alone in regards to left heart catheterization, left ventriculography, coronary angiography, and possible percutaneous coronary intervention. The patient voiced understanding and wishes to proceed. The patient has a good left radial pulse, normal ulnar pulse and a normal Freddy's sign.

## 2019-06-10 NOTE — CONSULTS
"[facility]    Cardiology Consultation     Date of Admission:  6/9/2019  Date of Consult (When I saw the patient): 06/10/19    Assessment & Plan   Princess Edgar is a 74 year old female with HTN, HL, DM and a hx of CAD with single vessel CABG in 1999 (pt reports to the LAD, done at [facility]-I can't find records) who was admitted on 6/9/2019 with CP. I was asked to see the patient for unstable angina.    1. Unstable angina  -trop negative. EKG with nonspecific anterior and inferior TW changes. Echo with preserved EF and no WMA  -Due to her hx of recurrent typical anginal symptoms I would recommend a definitive evaluation with coronary angiography with possible. R/B discussed. Pt agreeable. No hx of contrast allergy. I do not see any contraindication to dual antiplatelet therapy if needed.     2. HTN  -Pt states her BP had been somewhat uncontrolled until the past few months when she was put on \"three meds\", recently it has been well controlled.  -Will restart her losartan 100mg    3. HL  -Was on simvastatin PTA, will change her to atorvastatin 40mg daily.     Case and plan discussed with Dr Waggoner.    Vero Zimmer PA-C    Code Status    Full Code    Reason for Consult   Reason for consult: I was asked by Dr Neumann to evaluate this patient for chest pain/unstable angina.    Primary Care Physician   Madison Langley    Chief Complaint   Chest pain    History is obtained from the patient and electronic health record    History of Present Illness   Princess Edgar is a 74 year old female who presents with 3 days of intermittent substernal CP with activity that improves at rest. She does have a h/o CAD and states this is similar to what she experienced with her prior angina. She reports she initially had a stent placed to the \" maker\" in the late 90s, she felt better for a short time then had recurrent symptoms. She was found to have a \"plugged\" stent and went on to have single vessel CABG " in 1999 at [facility] (I can not find records). Her most recent cardiac work up was a stress test prior to knee surgery in [place] about 3-5 years ago. She reports this was normal. She has not see a cardiologist in a number of years (she previously had one in [place], [place]). She denies any coronary intervention since her CABG. She does state that her BP was suboptimally controlled until a few months ago when her PCP added an additional medication and now it has been controlled.     Past Medical History   I have reviewed this patient's medical history and updated it with pertinent information if needed.   Past Medical History:   Diagnosis Date     Anxiety      Arthritis      Diabetes (H)      Glaucoma (increased eye pressure)     Hyperlidipemia     CAD with prior stenting and subsequent CABG      Hypertension        Past Surgical History   I have reviewed this patient's surgical history and updated it with pertinent information if needed.  Past Surgical History:   Procedure Laterality Date     CARDIAC SURGERY       EYE SURGERY       ORTHOPEDIC SURGERY         Prior to Admission Medications   Prior to Admission Medications   Prescriptions Last Dose Informant Patient Reported? Taking?   LORAZEPAM PO 6/7/2019 at PM Self Yes Yes   Sig: Take 0.5 mg by mouth nightly as needed    RANITIDINE HCL PO 6/9/2019 at 1100 Self Yes Yes   Sig: Take 150 mg by mouth 2 times daily    SIMVASTATIN PO 6/9/2019 at 0200 Self Yes Yes   Sig: Take 20 mg by mouth At Bedtime    albuterol (PROAIR HFA/PROVENTIL HFA/VENTOLIN HFA) 108 (90 Base) MCG/ACT inhaler Unknown Self Yes No   Sig: Inhale 2 puffs into the lungs every 4 hours as needed for shortness of breath / dyspnea or wheezing   amLODIPine (NORVASC) 5 MG tablet 6/9/2019 at 1100 Self Yes Yes   Sig: Take 5 mg by mouth daily   aspirin 81 MG EC tablet 6/9/2019 at 1100 Self Yes Yes   Sig: Take 81 mg by mouth daily   diclofenac (VOLTAREN) 1 % topical gel Past Week Self Yes Yes   Sig: Place 2 g onto the skin 4  times daily   diphenoxylate-atropine (LOMOTIL) 2.5-0.025 MG tablet Unknown Self Yes No   Sig: Take 1 tablet by mouth 4 times daily as needed for diarrhea   fexofenadine-pseudoePHEDrine (ALLEGRA-D 24) 180-240 MG 24 hr tablet Past Week Self Yes Yes   Sig: Take 1 tablet by mouth daily   fluticasone (FLONASE) 50 MCG/ACT nasal spray 6/9/2019 at 1100 Self Yes Yes   Sig: Spray 2 sprays into both nostrils daily   losartan (COZAAR) 50 MG tablet 6/9/2019 at 1100 Self Yes Yes   Sig: Take 50 mg by mouth daily   metFORMIN (GLUCOPHAGE-XR) 500 MG 24 hr tablet 6/8/2019 at 1600 Self Yes Yes   Sig: Take 2,000 mg by mouth daily (with dinner)   metoprolol tartrate (LOPRESSOR) 25 MG tablet 6/9/2019 at 1100 Self Yes Yes   Sig: Take 12.5 mg by mouth 2 times daily   olopatadine (PATADAY) 0.2 % ophthalmic solution Past Month Self Yes Yes   Sig: Place 1 drop into both eyes daily as needed   traZODone (DESYREL) 100 MG tablet 6/8/2019 at PM Self Yes Yes   Sig: Take 50-75 mg by mouth At Bedtime   venlafaxine (EFFEXOR-ER) 225 MG TB24 24 hr tablet 6/9/2019 at 1100 Self Yes Yes   Sig: Take 150 mg by mouth daily (with breakfast)       Facility-Administered Medications: None     Allergies   No Known Allergies    Social History   I have reviewed this patient's social history and updated it with pertinent information if needed. Princess Edgar  does not have a smoking history on file. She has never used smokeless tobacco. She reports that she drinks alcohol. She reports that she does not use drugs.    Review of Systems   The 10 point Review of Systems is negative other than noted in the HPI or here.     Physical Exam   Temp: 97  F (36.1  C) Temp src: Oral BP: 156/65 Pulse: 73 Heart Rate: 66 Resp: 16 SpO2: 93 % O2 Device: None (Room air)    Vital Signs with Ranges  Temp:  [96.4  F (35.8  C)-97.5  F (36.4  C)] 97  F (36.1  C)  Pulse:  [] 73  Heart Rate:  [66-86] 66  Resp:  [16-28] 16  BP: (110-169)/(50-84) 156/65  SpO2:  [93 %-98 %] 93 %  189 lbs  8 oz    Constitutional     alert and oriented, in no acute distress.     Skin     warm and dry to touch    ENT     no pallor or cyanosis    Lungs  clear to auscultation     Cardiac  regular rhythm, S1 normal, S2 normal, No S3 or S4, no murmurs, no rubs    Abdomen     abdomen soft, bowel sounds normoactive, no hepatosplenomegaly    Extremities and Back     No edema observed.        Neurological     no gross motor deficits noted, affect appropriate, oriented to time, person and place.    Data   Results for orders placed or performed during the hospital encounter of 06/09/19 (from the past 24 hour(s))   EKG 12 lead   Result Value Ref Range    Interpretation ECG Click View Image link to view waveform and result    CBC with platelets differential   Result Value Ref Range    WBC 8.7 4.0 - 11.0 10e9/L    RBC Count 4.81 3.8 - 5.2 10e12/L    Hemoglobin 14.4 11.7 - 15.7 g/dL    Hematocrit 46.5 35.0 - 47.0 %    MCV 97 78 - 100 fl    MCH 29.9 26.5 - 33.0 pg    MCHC 31.0 (L) 31.5 - 36.5 g/dL    RDW 13.2 10.0 - 15.0 %    Platelet Count 332 150 - 450 10e9/L    Diff Method Automated Method     % Neutrophils 58.3 %    % Lymphocytes 23.3 %    % Monocytes 10.0 %    % Eosinophils 7.3 %    % Basophils 0.8 %    % Immature Granulocytes 0.3 %    Nucleated RBCs 0 0 /100    Absolute Neutrophil 5.1 1.6 - 8.3 10e9/L    Absolute Lymphocytes 2.0 0.8 - 5.3 10e9/L    Absolute Monocytes 0.9 0.0 - 1.3 10e9/L    Absolute Eosinophils 0.6 0.0 - 0.7 10e9/L    Absolute Basophils 0.1 0.0 - 0.2 10e9/L    Abs Immature Granulocytes 0.0 0 - 0.4 10e9/L    Absolute Nucleated RBC 0.0    Basic metabolic panel   Result Value Ref Range    Sodium 139 133 - 144 mmol/L    Potassium 4.0 3.4 - 5.3 mmol/L    Chloride 106 94 - 109 mmol/L    Carbon Dioxide 27 20 - 32 mmol/L    Anion Gap 6 3 - 14 mmol/L    Glucose 334 (H) 70 - 99 mg/dL    Urea Nitrogen 14 7 - 30 mg/dL    Creatinine 1.03 0.52 - 1.04 mg/dL    GFR Estimate 54 (L) >60 mL/min/[1.73_m2]    GFR Estimate If Black  63 >60 mL/min/[1.73_m2]    Calcium 8.7 8.5 - 10.1 mg/dL   Troponin I   Result Value Ref Range    Troponin I ES <0.015 0.000 - 0.045 ug/L   EKG 12 lead   Result Value Ref Range    Interpretation ECG Click View Image link to view waveform and result    Chest XR,  PA & LAT    Narrative    XR CHEST 2 VW 6/9/2019 6:04 PM    HISTORY: Pain.    COMPARISON: None.      Impression    IMPRESSION: The lungs are clear. No focal pulmonary opacities. Heart  and mediastinum are unremarkable. No acute cardiopulmonary  abnormalities. Midline sternal wires in place.     BARBI LUCIA MD   Hemoglobin A1c   Result Value Ref Range    Hemoglobin A1C 6.2 (H) 0 - 5.6 %   Troponin I - Now then in 4 hours x 3   Result Value Ref Range    Troponin I ES <0.015 0.000 - 0.045 ug/L   CBC with platelets   Result Value Ref Range    WBC 7.8 4.0 - 11.0 10e9/L    RBC Count 4.66 3.8 - 5.2 10e12/L    Hemoglobin 13.9 11.7 - 15.7 g/dL    Hematocrit 45.6 35.0 - 47.0 %    MCV 98 78 - 100 fl    MCH 29.8 26.5 - 33.0 pg    MCHC 30.5 (L) 31.5 - 36.5 g/dL    RDW 13.2 10.0 - 15.0 %    Platelet Count 304 150 - 450 10e9/L   Glucose by meter   Result Value Ref Range    Glucose 78 70 - 99 mg/dL   Glucose by meter   Result Value Ref Range    Glucose 106 (H) 70 - 99 mg/dL   Troponin I - Now then in 4 hours x 3   Result Value Ref Range    Troponin I ES <0.015 0.000 - 0.045 ug/L   Heparin 10a Level   Result Value Ref Range    Heparin 10A Level 0.37 IU/mL   Glucose by meter   Result Value Ref Range    Glucose 79 70 - 99 mg/dL   CBC with platelets   Result Value Ref Range    WBC 8.3 4.0 - 11.0 10e9/L    RBC Count 4.27 3.8 - 5.2 10e12/L    Hemoglobin 12.9 11.7 - 15.7 g/dL    Hematocrit 41.4 35.0 - 47.0 %    MCV 97 78 - 100 fl    MCH 30.2 26.5 - 33.0 pg    MCHC 31.2 (L) 31.5 - 36.5 g/dL    RDW 13.2 10.0 - 15.0 %    Platelet Count 277 150 - 450 10e9/L   Troponin I - Now then in 4 hours x 3   Result Value Ref Range    Troponin I ES <0.015 0.000 - 0.045 ug/L   Basic metabolic  panel   Result Value Ref Range    Sodium 140 133 - 144 mmol/L    Potassium 4.3 3.4 - 5.3 mmol/L    Chloride 108 94 - 109 mmol/L    Carbon Dioxide 28 20 - 32 mmol/L    Anion Gap 4 3 - 14 mmol/L    Glucose 141 (H) 70 - 99 mg/dL    Urea Nitrogen 14 7 - 30 mg/dL    Creatinine 0.88 0.52 - 1.04 mg/dL    GFR Estimate 65 >60 mL/min/[1.73_m2]    GFR Estimate If Black 76 >60 mL/min/[1.73_m2]    Calcium 8.3 (L) 8.5 - 10.1 mg/dL   Echocardiogram Complete    Narrative    [id number]  [id number]  [id number]  [id number]^KAREN^CASI^E           [facility]  Echocardiography Laboratory  [address] [place] [address]        Name: HANS LLOYD  MRN: [id number]  : 1945  Study Date: 06/10/2019 07:34 AM  Age: 74 yrs  Gender: Female  Patient Location: [facility]  Reason For Study: Chest Pain, Chest Tightness, Chest Pressure  Ordering Physician: CASI JONES  Referring Physician: 3-[facility]  Performed By: Norm Marc RDCS     BSA: 1.9 m2  Height: 65 in  Weight: 189 lb  HR: 68  BP: 152/71 mmHg  _____________________________________________________________________________  __        Procedure  Complete Echo Adult.  _____________________________________________________________________________  __        Interpretation Summary     Left ventricular systolic function is normal.  The visual ejection fraction is estimated at 55-60%.  No regional wall motion abnormalities noted.  The study was technically difficult. There is no comparison study available.  _____________________________________________________________________________  __        Left Ventricle  The left ventricle is normal in size. There is normal left ventricular wall  thickness. Left ventricular systolic function is normal. The visual ejection  fraction is estimated at 55-60%. Diastolic Doppler findings (E/E' ratio and/or  other parameters) suggest left ventricular filling pressures are  indeterminate. No regional wall motion abnormalities noted.      Right Ventricle  The right ventricle is not well visualized. The right ventricular systolic  function is normal.     Atria  Normal left atrial size. Right atrial size is normal.     Mitral Valve  The mitral valve leaflets are mildly thickened. There is trace to mild mitral  regurgitation.        Tricuspid Valve  There is mild (1+) tricuspid regurgitation. The right ventricular systolic  pressure is approximated at 34.6 mmHg plus the right atrial pressure. Right  ventricular systolic pressure is elevated, consistent with mild pulmonary  hypertension. IVC diameter <2.1 cm collapsing >50% with sniff suggests a  normal RA pressure of 3 mmHg.     Aortic Valve  There is mild trileaflet aortic sclerosis. No aortic stenosis is present.     Pulmonic Valve  The pulmonic valve is not well seen, but is grossly normal. There is mild (1+)  pulmonic valvular regurgitation.     Vessels  The aortic root is normal size.     Pericardium  There is no pericardial effusion.        Rhythm  Sinus rhythm was noted.  _____________________________________________________________________________  __  MMode/2D Measurements & Calculations  IVSd: 1.1 cm     LVIDd: 4.5 cm  LVIDs: 2.8 cm  LVPWd: 0.84 cm  FS: 37.2 %  LV mass(C)d: 148.0 grams  LV mass(C)dI: 76.6 grams/m2  Ao root diam: 2.8 cm  LA dimension: 3.7 cm  asc Aorta Diam: 3.1 cm  LA/Ao: 1.3  LA Volume (BP): 39.4 ml  LA Volume Index (BP): 20.4 ml/m2  RWT: 0.37           Doppler Measurements & Calculations  MV E max dandre: 74.1 cm/sec  MV A max dandre: 97.9 cm/sec  MV E/A: 0.76  MV dec time: 0.27 sec  Ao V2 max: 156.4 cm/sec  Ao max PG: 10.0 mmHg  LV V1 max P.1 mmHg  LV V1 max: 112.4 cm/sec  PA acc time: 0.12 sec  TR max dandre: 294.1 cm/sec  TR max P.6 mmHg  AV Dandre Ratio (DI): 0.72  E/E' av.0  Lateral E/e': 10.2  Medial E/e': 13.9              _____________________________________________________________________________  __        Report approved by: Seth Guillen 06/10/2019 09:01  "AM      Glucose by meter   Result Value Ref Range    Glucose 93 70 - 99 mg/dL     EKGs: SR with nonspecific anterior and inferior TW changes. No prior on file, although a report from a stress test in 2002 mentioned \"nonspecific anterior T wave flattening\"    TELE: SR, no arrhythmia  "

## 2019-06-10 NOTE — PLAN OF CARE
CR/OT: Orders received, chart reviewed. Pt admitted due to unstable angina, recommended for angio today. Will hold CR/OT eval until angio completed and further recs made by cardiology. Will continue to follow.

## 2019-06-10 NOTE — PLAN OF CARE
Presentation/Diagnosis: Pt admitted 6/9 due to chest pain. Pt diagnosed with unstable angina.   History: CAD, CABG (1999), DM 2, HTN, anxiety, glaucoma, arthritis.   Labs/Protocols: K and Mag protocol. K:4.3, Hgb: 12.9, Platelets 277  Vitals: BP elevated 150s-160s SBP. Other VSS on RA. Pt denies pain this shift.   Cardiac: Denies chest pain throughout shift. Echo done this shift. EF: 55-60%. Cardiology following. Angiogram planned for today.   Telemetry: NSR  Respiratory: WDL. Denies SOB.   Neuro: A&Ox4. Calm and cooperative with cares.   GI/: WDL. Last BM 6/8/19  Skin: Scattered bruising, skin intact.   LDAs: PIV to L FA, infusing heparin at 7mL/hr and NS at 150mL/hr.   Diet: NPO  Activity:  SBA  Teaching: Pt educated on plan of care. Pt shown angiogram video this shift, denies any questions or concerns.   Plan: Angiogram today. Possible discharge to home tomorrow.    Will continue to monitor.

## 2019-06-10 NOTE — PLAN OF CARE
A&Ox4, VSS, denies pain, Coronary Angiogram re-scheduled tomorrow, , NPO at midnight, Heparin gtt@7ml/hr, will continue with POC.

## 2019-06-10 NOTE — PROGRESS NOTES
Infection Prevention:    Patient requires Contact precautions because of MRSA: unknown site, 2015, per H & P. Please contact Infection Prevention with any questions/concerns at *[phone].    Jenna Verma, ICP

## 2019-06-10 NOTE — PLAN OF CARE
Pt arrived to unit at 1950.  VSS. Tele SR. Up with standby assist. No c/o pain or SOB. Lung sounds clear. On mod carb diet, good appetite. BG 78 for late dinner. Will recheck later for bedtime. Voiding well. PIV running hep gtt at 8 ml/hr. Cards consulted. Will continue to monitor and follow POC.

## 2019-06-11 ENCOUNTER — SURGERY (OUTPATIENT)
Age: 74
End: 2019-06-11
Payer: COMMERCIAL

## 2019-06-11 LAB
APTT PPP: 43 SEC (ref 22–37)
GLUCOSE BLDC GLUCOMTR-MCNC: 107 MG/DL (ref 70–99)
GLUCOSE BLDC GLUCOMTR-MCNC: 131 MG/DL (ref 70–99)
GLUCOSE BLDC GLUCOMTR-MCNC: 93 MG/DL (ref 70–99)
GLUCOSE BLDC GLUCOMTR-MCNC: 95 MG/DL (ref 70–99)
GLUCOSE BLDC GLUCOMTR-MCNC: 97 MG/DL (ref 70–99)
KCT BLD-ACNC: 184 SEC (ref 75–150)
KCT BLD-ACNC: 229 SEC (ref 75–150)
LMWH PPP CHRO-ACNC: 0.2 IU/ML
MAGNESIUM SERPL-MCNC: 2.5 MG/DL (ref 1.6–2.3)
POTASSIUM SERPL-SCNC: 4.1 MMOL/L (ref 3.4–5.3)

## 2019-06-11 PROCEDURE — 85730 THROMBOPLASTIN TIME PARTIAL: CPT | Performed by: INTERNAL MEDICINE

## 2019-06-11 PROCEDURE — 25800030 ZZH RX IP 258 OP 636: Performed by: INTERNAL MEDICINE

## 2019-06-11 PROCEDURE — 25000132 ZZH RX MED GY IP 250 OP 250 PS 637: Performed by: INTERNAL MEDICINE

## 2019-06-11 PROCEDURE — 27210794 ZZH OR GENERAL SUPPLY STERILE: Performed by: INTERNAL MEDICINE

## 2019-06-11 PROCEDURE — 93571 IV DOP VEL&/PRESS C FLO 1ST: CPT | Mod: 26 | Performed by: INTERNAL MEDICINE

## 2019-06-11 PROCEDURE — 4A023N7 MEASUREMENT OF CARDIAC SAMPLING AND PRESSURE, LEFT HEART, PERCUTANEOUS APPROACH: ICD-10-PCS | Performed by: INTERNAL MEDICINE

## 2019-06-11 PROCEDURE — 85347 COAGULATION TIME ACTIVATED: CPT

## 2019-06-11 PROCEDURE — B2111ZZ FLUOROSCOPY OF MULTIPLE CORONARY ARTERIES USING LOW OSMOLAR CONTRAST: ICD-10-PCS | Performed by: INTERNAL MEDICINE

## 2019-06-11 PROCEDURE — 83735 ASSAY OF MAGNESIUM: CPT | Performed by: HOSPITALIST

## 2019-06-11 PROCEDURE — 99232 SBSQ HOSP IP/OBS MODERATE 35: CPT | Mod: 25 | Performed by: INTERNAL MEDICINE

## 2019-06-11 PROCEDURE — 84132 ASSAY OF SERUM POTASSIUM: CPT | Performed by: HOSPITALIST

## 2019-06-11 PROCEDURE — 25000132 ZZH RX MED GY IP 250 OP 250 PS 637: Performed by: HOSPITALIST

## 2019-06-11 PROCEDURE — 25000128 H RX IP 250 OP 636: Performed by: INTERNAL MEDICINE

## 2019-06-11 PROCEDURE — 99152 MOD SED SAME PHYS/QHP 5/>YRS: CPT | Performed by: INTERNAL MEDICINE

## 2019-06-11 PROCEDURE — 85520 HEPARIN ASSAY: CPT | Performed by: INTERNAL MEDICINE

## 2019-06-11 PROCEDURE — 93459 L HRT ART/GRFT ANGIO: CPT | Performed by: INTERNAL MEDICINE

## 2019-06-11 PROCEDURE — 36415 COLL VENOUS BLD VENIPUNCTURE: CPT | Performed by: HOSPITALIST

## 2019-06-11 PROCEDURE — 36415 COLL VENOUS BLD VENIPUNCTURE: CPT | Performed by: INTERNAL MEDICINE

## 2019-06-11 PROCEDURE — C1894 INTRO/SHEATH, NON-LASER: HCPCS | Performed by: INTERNAL MEDICINE

## 2019-06-11 PROCEDURE — 93571 IV DOP VEL&/PRESS C FLO 1ST: CPT | Performed by: INTERNAL MEDICINE

## 2019-06-11 PROCEDURE — 25000125 ZZHC RX 250: Performed by: INTERNAL MEDICINE

## 2019-06-11 PROCEDURE — 99232 SBSQ HOSP IP/OBS MODERATE 35: CPT | Performed by: HOSPITALIST

## 2019-06-11 PROCEDURE — C1769 GUIDE WIRE: HCPCS | Performed by: INTERNAL MEDICINE

## 2019-06-11 PROCEDURE — C1887 CATHETER, GUIDING: HCPCS | Performed by: INTERNAL MEDICINE

## 2019-06-11 PROCEDURE — 00000146 ZZHCL STATISTIC GLUCOSE BY METER IP

## 2019-06-11 PROCEDURE — 25000132 ZZH RX MED GY IP 250 OP 250 PS 637: Performed by: PHYSICIAN ASSISTANT

## 2019-06-11 PROCEDURE — 93459 L HRT ART/GRFT ANGIO: CPT | Mod: 26 | Performed by: INTERNAL MEDICINE

## 2019-06-11 PROCEDURE — 99153 MOD SED SAME PHYS/QHP EA: CPT | Performed by: INTERNAL MEDICINE

## 2019-06-11 PROCEDURE — 12000000 ZZH R&B MED SURG/OB

## 2019-06-11 RX ORDER — FLUMAZENIL 0.1 MG/ML
0.2 INJECTION, SOLUTION INTRAVENOUS
Status: ACTIVE | OUTPATIENT
Start: 2019-06-11 | End: 2019-06-12

## 2019-06-11 RX ORDER — LORAZEPAM 2 MG/ML
0.5 INJECTION INTRAMUSCULAR
Status: DISCONTINUED | OUTPATIENT
Start: 2019-06-11 | End: 2019-06-11 | Stop reason: HOSPADM

## 2019-06-11 RX ORDER — NALOXONE HYDROCHLORIDE 0.4 MG/ML
.1-.4 INJECTION, SOLUTION INTRAMUSCULAR; INTRAVENOUS; SUBCUTANEOUS
Status: DISCONTINUED | OUTPATIENT
Start: 2019-06-11 | End: 2019-06-12 | Stop reason: HOSPADM

## 2019-06-11 RX ORDER — HEPARIN SODIUM 1000 [USP'U]/ML
INJECTION, SOLUTION INTRAVENOUS; SUBCUTANEOUS
Status: DISCONTINUED | OUTPATIENT
Start: 2019-06-11 | End: 2019-06-11 | Stop reason: HOSPADM

## 2019-06-11 RX ORDER — ARGATROBAN 1 MG/ML
150 INJECTION, SOLUTION INTRAVENOUS
Status: DISCONTINUED | OUTPATIENT
Start: 2019-06-11 | End: 2019-06-11 | Stop reason: HOSPADM

## 2019-06-11 RX ORDER — HYDROCODONE BITARTRATE AND ACETAMINOPHEN 5; 325 MG/1; MG/1
1-2 TABLET ORAL EVERY 4 HOURS PRN
Status: DISCONTINUED | OUTPATIENT
Start: 2019-06-11 | End: 2019-06-12 | Stop reason: HOSPADM

## 2019-06-11 RX ORDER — EPTIFIBATIDE 2 MG/ML
2 INJECTION, SOLUTION INTRAVENOUS CONTINUOUS PRN
Status: DISCONTINUED | OUTPATIENT
Start: 2019-06-11 | End: 2019-06-11 | Stop reason: HOSPADM

## 2019-06-11 RX ORDER — IOPAMIDOL 755 MG/ML
INJECTION, SOLUTION INTRAVASCULAR
Status: DISCONTINUED | OUTPATIENT
Start: 2019-06-11 | End: 2019-06-11 | Stop reason: HOSPADM

## 2019-06-11 RX ORDER — DOBUTAMINE HYDROCHLORIDE 200 MG/100ML
2-20 INJECTION INTRAVENOUS CONTINUOUS PRN
Status: DISCONTINUED | OUTPATIENT
Start: 2019-06-11 | End: 2019-06-11 | Stop reason: HOSPADM

## 2019-06-11 RX ORDER — SODIUM CHLORIDE 9 MG/ML
INJECTION, SOLUTION INTRAVENOUS CONTINUOUS
Status: DISCONTINUED | OUTPATIENT
Start: 2019-06-11 | End: 2019-06-12 | Stop reason: HOSPADM

## 2019-06-11 RX ORDER — VERAPAMIL HYDROCHLORIDE 2.5 MG/ML
INJECTION, SOLUTION INTRAVENOUS
Status: DISCONTINUED | OUTPATIENT
Start: 2019-06-11 | End: 2019-06-11 | Stop reason: HOSPADM

## 2019-06-11 RX ORDER — FENTANYL CITRATE 50 UG/ML
25-50 INJECTION, SOLUTION INTRAMUSCULAR; INTRAVENOUS
Status: ACTIVE | OUTPATIENT
Start: 2019-06-11 | End: 2019-06-12

## 2019-06-11 RX ORDER — FENTANYL CITRATE 50 UG/ML
INJECTION, SOLUTION INTRAMUSCULAR; INTRAVENOUS
Status: DISCONTINUED | OUTPATIENT
Start: 2019-06-11 | End: 2019-06-11 | Stop reason: HOSPADM

## 2019-06-11 RX ORDER — LIDOCAINE 40 MG/G
CREAM TOPICAL
Status: DISCONTINUED | OUTPATIENT
Start: 2019-06-11 | End: 2019-06-11 | Stop reason: HOSPADM

## 2019-06-11 RX ORDER — ATROPINE SULFATE 0.1 MG/ML
0.5 INJECTION INTRAVENOUS EVERY 5 MIN PRN
Status: ACTIVE | OUTPATIENT
Start: 2019-06-11 | End: 2019-06-12

## 2019-06-11 RX ORDER — LIDOCAINE 40 MG/G
CREAM TOPICAL
Status: DISCONTINUED | OUTPATIENT
Start: 2019-06-11 | End: 2019-06-12 | Stop reason: HOSPADM

## 2019-06-11 RX ORDER — DOPAMINE HYDROCHLORIDE 160 MG/100ML
2-20 INJECTION, SOLUTION INTRAVENOUS CONTINUOUS PRN
Status: DISCONTINUED | OUTPATIENT
Start: 2019-06-11 | End: 2019-06-11 | Stop reason: HOSPADM

## 2019-06-11 RX ORDER — ACETAMINOPHEN 325 MG/1
650 TABLET ORAL EVERY 4 HOURS PRN
Status: DISCONTINUED | OUTPATIENT
Start: 2019-06-11 | End: 2019-06-12 | Stop reason: HOSPADM

## 2019-06-11 RX ORDER — SODIUM CHLORIDE 9 MG/ML
INJECTION, SOLUTION INTRAVENOUS CONTINUOUS
Status: DISCONTINUED | OUTPATIENT
Start: 2019-06-11 | End: 2019-06-11 | Stop reason: HOSPADM

## 2019-06-11 RX ORDER — VERAPAMIL HYDROCHLORIDE 2.5 MG/ML
INJECTION, SOLUTION INTRAVENOUS
Status: DISCONTINUED
Start: 2019-06-11 | End: 2019-06-11 | Stop reason: HOSPADM

## 2019-06-11 RX ORDER — LIDOCAINE HYDROCHLORIDE 10 MG/ML
INJECTION, SOLUTION EPIDURAL; INFILTRATION; INTRACAUDAL; PERINEURAL
Status: DISCONTINUED
Start: 2019-06-11 | End: 2019-06-11 | Stop reason: HOSPADM

## 2019-06-11 RX ORDER — ARGATROBAN 1 MG/ML
350 INJECTION, SOLUTION INTRAVENOUS
Status: DISCONTINUED | OUTPATIENT
Start: 2019-06-11 | End: 2019-06-11 | Stop reason: HOSPADM

## 2019-06-11 RX ORDER — NOREPINEPHRINE BITARTRATE 0.06 MG/ML
.03-.4 INJECTION, SOLUTION INTRAVENOUS CONTINUOUS PRN
Status: DISCONTINUED | OUTPATIENT
Start: 2019-06-11 | End: 2019-06-11 | Stop reason: HOSPADM

## 2019-06-11 RX ORDER — NALOXONE HYDROCHLORIDE 0.4 MG/ML
.2-.4 INJECTION, SOLUTION INTRAMUSCULAR; INTRAVENOUS; SUBCUTANEOUS
Status: DISCONTINUED | OUTPATIENT
Start: 2019-06-11 | End: 2019-06-11

## 2019-06-11 RX ORDER — NITROGLYCERIN 20 MG/100ML
.07-2 INJECTION INTRAVENOUS CONTINUOUS PRN
Status: DISCONTINUED | OUTPATIENT
Start: 2019-06-11 | End: 2019-06-11 | Stop reason: HOSPADM

## 2019-06-11 RX ORDER — NITROGLYCERIN 5 MG/ML
VIAL (ML) INTRAVENOUS
Status: DISCONTINUED | OUTPATIENT
Start: 2019-06-11 | End: 2019-06-11 | Stop reason: HOSPADM

## 2019-06-11 RX ORDER — LORAZEPAM 0.5 MG/1
0.5 TABLET ORAL
Status: DISCONTINUED | OUTPATIENT
Start: 2019-06-11 | End: 2019-06-11 | Stop reason: HOSPADM

## 2019-06-11 RX ORDER — FENTANYL CITRATE 50 UG/ML
INJECTION, SOLUTION INTRAMUSCULAR; INTRAVENOUS
Status: DISCONTINUED
Start: 2019-06-11 | End: 2019-06-11 | Stop reason: HOSPADM

## 2019-06-11 RX ORDER — POTASSIUM CHLORIDE 1500 MG/1
20 TABLET, EXTENDED RELEASE ORAL
Status: DISCONTINUED | OUTPATIENT
Start: 2019-06-11 | End: 2019-06-11 | Stop reason: HOSPADM

## 2019-06-11 RX ORDER — HEPARIN SODIUM 1000 [USP'U]/ML
INJECTION, SOLUTION INTRAVENOUS; SUBCUTANEOUS
Status: DISCONTINUED
Start: 2019-06-11 | End: 2019-06-11 | Stop reason: HOSPADM

## 2019-06-11 RX ORDER — NITROGLYCERIN 5 MG/ML
VIAL (ML) INTRAVENOUS
Status: DISCONTINUED
Start: 2019-06-11 | End: 2019-06-11 | Stop reason: HOSPADM

## 2019-06-11 RX ORDER — EPTIFIBATIDE 2 MG/ML
180 INJECTION, SOLUTION INTRAVENOUS EVERY 10 MIN PRN
Status: DISCONTINUED | OUTPATIENT
Start: 2019-06-11 | End: 2019-06-11 | Stop reason: HOSPADM

## 2019-06-11 RX ADMIN — LORATADINE 10 MG: 10 TABLET ORAL at 08:21

## 2019-06-11 RX ADMIN — Medication 12.5 MG: at 21:27

## 2019-06-11 RX ADMIN — SODIUM CHLORIDE: 9 INJECTION, SOLUTION INTRAVENOUS at 12:32

## 2019-06-11 RX ADMIN — AMLODIPINE BESYLATE 5 MG: 5 TABLET ORAL at 08:21

## 2019-06-11 RX ADMIN — ASPIRIN 325 MG: 325 TABLET, DELAYED RELEASE ORAL at 08:20

## 2019-06-11 RX ADMIN — LOSARTAN POTASSIUM 100 MG: 100 TABLET ORAL at 08:20

## 2019-06-11 RX ADMIN — RANITIDINE 150 MG: 150 TABLET ORAL at 21:29

## 2019-06-11 RX ADMIN — Medication 12.5 MG: at 08:21

## 2019-06-11 RX ADMIN — HEPARIN SODIUM 700 UNITS/HR: 10000 INJECTION, SOLUTION INTRAVENOUS at 00:38

## 2019-06-11 RX ADMIN — ACETAMINOPHEN 650 MG: 325 TABLET, FILM COATED ORAL at 19:30

## 2019-06-11 RX ADMIN — SODIUM CHLORIDE: 9 INJECTION, SOLUTION INTRAVENOUS at 17:17

## 2019-06-11 RX ADMIN — VENLAFAXINE HYDROCHLORIDE 150 MG: 150 CAPSULE, EXTENDED RELEASE ORAL at 08:21

## 2019-06-11 RX ADMIN — FLUTICASONE PROPIONATE 2 SPRAY: 50 SPRAY, METERED NASAL at 08:22

## 2019-06-11 RX ADMIN — ATORVASTATIN CALCIUM 40 MG: 40 TABLET, FILM COATED ORAL at 21:29

## 2019-06-11 RX ADMIN — RANITIDINE 150 MG: 150 TABLET ORAL at 08:21

## 2019-06-11 ASSESSMENT — ACTIVITIES OF DAILY LIVING (ADL)
ADLS_ACUITY_SCORE: 11

## 2019-06-11 ASSESSMENT — MIFFLIN-ST. JEOR: SCORE: 1364.55

## 2019-06-11 NOTE — PLAN OF CARE
ORIENTATION: A&O  VS: WNL  TELE: SR  LUNGS: 94% RA; Clear  : WDL  GI: Last BM 6/8/19  IV: Hep @ 7mL/hr  PAIN: Denies  ACTIVITY: SBA  DIET:  Cardiac diet; NPO at midnight  PLAN: Angio tomorrow; possible discharge tomorrow; continue w/POC

## 2019-06-11 NOTE — PLAN OF CARE
RN SHIFT SUMMARY :  DX/STORY : admit from home 6/9 with CP- went for angio this afternoon  HX : DM-oral agent, HTN,CAD-CAB '1999, depression/anxiety. GERD,   LABS/PROTOCOLS : hgb a1c-6.2,  glucs ok.  trops NEG, K 4.1, MG 2.5 .   Went down to cath lab & Shut off HEP. Gtt- . NS at 150   TELE : SB-SR (50's-60)   ASSESS : a/o, pleasant, indep in room. Denies any pain today >> feels family  Stress may be adding to Chest pain . -was active at home.  No Bm x 2 -states hasn't really eaten in 2 days . BRENDAN -keila aides in  TEACHING : had prior knowledge of angio & viewed video.   PLAN : at baseline is single & from [place], adult kids - cont. POC of Cardiology  Consult .  ANGIO today >> Pending results

## 2019-06-11 NOTE — PROGRESS NOTES
"[facility]  Cardiology Progress Note    Outpatient cardiologist: Would be Dr. Waggoner going forward    Date of Service (when I saw the patient): 06/11/2019    Summary: Princess Edgar is a 74 year old female with history of  HTN, HL, DM and a hx of CAD with single vessel CABG in 1999 (pt reports to the LAD, done at [facility]-I can't find records). She was admitted on 6/9/2019 with CP.        Interval History   Tele: SR    Doing well. No chest pain. No bleeding.        Assessment & Plan   1. Unstable angina  - With h/o CAD and PCI of LAD in 1999 and CABG in 1999  - EKG with nonspecific anterior and inferior TW changes  - Trops negative  - Echo with preserved EF and no WMA  - Due to her hx of recurrent typical anginal symptoms, cardiac cath is ordered.     * R and B discussed with patient by Dr. Waggoner.  All questions answered.  Consent signed. No h/o contrast dye allergy.    * No apparent CI to MODESTA  - Continue heparin , ASA, BB, statin  - Further recs pending results        2. HTN  - Pt states her BP had been somewhat uncontrolled until the past few months when she was put on \"three meds\", recently it has been well controlled.  - BP is better controlled, elevated this AM before meds  - Continue metoprolol 12.5 mg BID, amlodipine 5 mg, losartan 100mg  - Continue to follow       3. HL  - Was on simvastatin PTA, now changed to atorvastatin 40mg daily.  FLP/ALT in 2-3 months       Catalina Benson PA-C      Patient Active Problem List   Diagnosis     Unstable angina (H)     ACS (acute coronary syndrome) (H)       Physical Exam   Temp: 97.3  F (36.3  C) Temp src: Oral BP: 146/57   Heart Rate: 68 Resp: 16 SpO2: 93 % O2 Device: None (Room air)    Vitals:    06/09/19 1951 06/10/19 0513 06/11/19 0556   Weight: 86.3 kg (190 lb 3.2 oz) 86 kg (189 lb 8 oz) 85.4 kg (188 lb 3.2 oz)     Vital Signs with Ranges  Temp:  [96.6  F (35.9  C)-97.3  F (36.3  C)] 97.3  F (36.3  C)  Heart Rate:  [59-68] 68  Resp:  [16-18] " 16  BP: (126-160)/(54-64) 146/57  SpO2:  [93 %-94 %] 93 %  I/O last 3 completed shifts:  In: 243 [P.O.:240; I.V.:3]  Out: 1900 [Urine:1900]    Constitutional: NAD.   Respiratory: CTAB.   Cardiovascular: RRR, s1s2, no sig murmur  GI: soft, BS+  Skin: warm, no rashes  Musculoskeletal: Moving all extremities  Neurologic: Alert, oriented x 3  Neuropsychiatric: Normal affect       Data   Recent Labs   Lab 06/11/19  0801 06/10/19  0419 06/10/19  0417 06/10/19  0023 06/09/19 2002 06/09/19  1723   WBC  --   --  8.3  --  7.8 8.7   HGB  --   --  12.9  --  13.9 14.4   MCV  --   --  97  --  98 97   PLT  --   --  277  --  304 332   NA  --  140  --   --   --  139   POTASSIUM 4.1 4.3  --   --   --  4.0   CHLORIDE  --  108  --   --   --  106   CO2  --  28  --   --   --  27   BUN  --  14  --   --   --  14   CR  --  0.88  --   --   --  1.03   ANIONGAP  --  4  --   --   --  6   DORIE  --  8.3*  --   --   --  8.7   GLC  --  141*  --   --   --  334*   TROPI  --  <0.015  --  <0.015 <0.015 <0.015     No results found for this or any previous visit (from the past 24 hour(s)).    Echo 6/10/19:  Interpretation Summary     Left ventricular systolic function is normal.  The visual ejection fraction is estimated at 55-60%.  No regional wall motion abnormalities noted.  The study was technically difficult. There is no comparison study available.        Medications     argatroban       argatroban       bivalirudin (ANGIOMAX) infusion ADULT       cangrelor (KENGREAL) infusion ADULT       cangrelor (KENGREAL) infusion ADULT       - MEDICATION INSTRUCTIONS -       - MEDICATION INSTRUCTIONS -       - MEDICATION INSTRUCTIONS -       DOBUTamine       DOBUTamine       DOPamine       DOPamine       EPINEPHrine IV infusion ADULT       EPINEPHrine IV infusion ADULT       eptifibatide       eptifibatide       HEParin 700 Units/hr (06/11/19 0038)     HEParin       HEParin       - MEDICATION INSTRUCTIONS -       - MEDICATION INSTRUCTIONS -       nitroGLYcerin        nitroGLYcerin       nitroPRUsside (NIPRIDE) IV infusion ADULT/PEDS GREATER than or EQUAL to 45 kg std conc       nitroPRUsside (NIPRIDE) IV infusion ADULT/PEDS GREATER than or EQUAL to 45 kg std conc       norepinephrine       norepinephrine       - MEDICATION INSTRUCTIONS -       - MEDICATION INSTRUCTIONS -       - MEDICATION INSTRUCTIONS -       phenylephrine       sodium chloride       tirofiban       tirofiban       vasopressin (PITRESSIN) infusion ADULT (40 mL)       vasopressin (PITRESSIN) infusion ADULT (40 mL)         amLODIPine  5 mg Oral Daily     aspirin  325 mg Oral Daily     atorvastatin  40 mg Oral QPM     cangrelor  30 mcg/kg Intravenous Once     cangrelor  30 mcg/kg Intravenous Once     fluticasone  2 spray Both Nostrils Daily     insulin aspart  1-7 Units Subcutaneous At Bedtime     insulin aspart  1-10 Units Subcutaneous TID AC     loratadine  10 mg Oral Daily     losartan  100 mg Oral Daily     metoprolol tartrate  12.5 mg Oral BID     ranitidine  150 mg Oral BID     sodium chloride (PF)  3 mL Intracatheter Q8H     sodium chloride (PF)  3 mL Intracatheter Q8H     traZODone  50-75 mg Oral At Bedtime     venlafaxine  150 mg Oral Daily with breakfast

## 2019-06-11 NOTE — PROGRESS NOTES
[facility]    Hospitalist Progress Note  Name: Princess Edgar    MRN: [id number]  Provider:  Nick East DO, MPH  Date of Service: 06/11/2019    Summary of Stay: Princess Edgar is a 74 year old female with a history of hypertension, diabetes, depression, anxiety, GERD, hyperlipidemia, and coronary artery disease status post CABG about 20 years ago admitted on 6/9/2019 with exertional chest pain concerning for unstable angina.    Problem List:   1. Unstable angina: Plan for coronary angina gram today.  Continue aspirin, atorvastatin, metoprolol, and heparin drip.  Stop the scheduled pseudoephedrine that she takes with her loratadine daily.  No chest pain currently or overnight.  2. Hypertension: Blood pressure reasonable.  Continue metoprolol, losartan, amlodipine.  3. Diabetes: Hold prior to admission metformin with contrast exposure planned.  Continue high sliding scale insulin.  Hemoglobin A1c at goal at 6.2.    DVT Prophylaxis: Heparin   Code Status: Full Code  Disposition: Expected discharge in 1 days to home. Goals prior to discharge include coronary angiogram.   Incidental Findings: None.  Family updated today: No     Interval History   The patient reports doing well. No chest pain or shortness of breath. No nausea, vomiting, diarrhea, constipation. No fevers. No other specific complaints identified.     -Data reviewed today: I personally reviewed all new labs and imaging results over the last 24 hours.     Physical Exam   Temp: 97.3  F (36.3  C) Temp src: Oral BP: 146/57   Heart Rate: 68 Resp: 16 SpO2: 93 % O2 Device: None (Room air)    Vitals:    06/09/19 1951 06/10/19 0513 06/11/19 0556   Weight: 86.3 kg (190 lb 3.2 oz) 86 kg (189 lb 8 oz) 85.4 kg (188 lb 3.2 oz)     Vital Signs with Ranges  Temp:  [96.6  F (35.9  C)-97.3  F (36.3  C)] 97.3  F (36.3  C)  Heart Rate:  [59-68] 68  Resp:  [16-18] 16  BP: (126-160)/(54-64) 146/57  SpO2:  [93 %-94 %] 93 %  I/O last 3 completed shifts:  In: 243  [P.O.:240; I.V.:3]  Out: 1900 [Urine:1900]    GENERAL: No apparent distress. Awake, alert, and fully oriented.  HEENT: Normocephalic, atraumatic. Extraocular movements intact.  CARDIOVASCULAR: Regular rate and rhythm without murmurs or rubs. No S3.  PULMONARY: Clear bilaterally.  GASTROINTESTINAL: Soft, non-tender, non-distended. Bowel sounds normoactive.   EXTREMITIES: No cyanosis or clubbing. No edema.  NEUROLOGICAL: CN 2-12 grossly intact, no focal neurological deficits.  DERMATOLOGICAL: No rash, ulcer, bruising, nor jaundice.     Medications     argatroban       argatroban       bivalirudin (ANGIOMAX) infusion ADULT       cangrelor (KENGREAL) infusion ADULT       cangrelor (KENGREAL) infusion ADULT       - MEDICATION INSTRUCTIONS -       - MEDICATION INSTRUCTIONS -       - MEDICATION INSTRUCTIONS -       DOBUTamine       DOBUTamine       DOPamine       DOPamine       EPINEPHrine IV infusion ADULT       EPINEPHrine IV infusion ADULT       eptifibatide       eptifibatide       HEParin 700 Units/hr (06/11/19 0038)     HEParin       HEParin       - MEDICATION INSTRUCTIONS -       - MEDICATION INSTRUCTIONS -       nitroGLYcerin       nitroGLYcerin       nitroPRUsside (NIPRIDE) IV infusion ADULT/PEDS GREATER than or EQUAL to 45 kg std conc       nitroPRUsside (NIPRIDE) IV infusion ADULT/PEDS GREATER than or EQUAL to 45 kg std conc       norepinephrine       norepinephrine       - MEDICATION INSTRUCTIONS -       - MEDICATION INSTRUCTIONS -       - MEDICATION INSTRUCTIONS -       phenylephrine       sodium chloride       tirofiban       tirofiban       vasopressin (PITRESSIN) infusion ADULT (40 mL)       vasopressin (PITRESSIN) infusion ADULT (40 mL)         amLODIPine  5 mg Oral Daily     aspirin  325 mg Oral Daily     atorvastatin  40 mg Oral QPM     cangrelor  30 mcg/kg Intravenous Once     cangrelor  30 mcg/kg Intravenous Once     fluticasone  2 spray Both Nostrils Daily     insulin aspart  1-7 Units Subcutaneous At  Bedtime     insulin aspart  1-10 Units Subcutaneous TID AC     loratadine  10 mg Oral Daily     losartan  100 mg Oral Daily     metoprolol tartrate  12.5 mg Oral BID     ranitidine  150 mg Oral BID     sodium chloride (PF)  3 mL Intracatheter Q8H     sodium chloride (PF)  3 mL Intracatheter Q8H     traZODone  50-75 mg Oral At Bedtime     venlafaxine  150 mg Oral Daily with breakfast     Data     Laboratory:  Recent Labs   Lab 06/10/19  0417 06/09/19  2002 06/09/19  1723   WBC 8.3 7.8 8.7   HGB 12.9 13.9 14.4   HCT 41.4 45.6 46.5   MCV 97 98 97    304 332     Recent Labs   Lab 06/11/19  0801 06/10/19  0419 06/09/19  1723   NA  --  140 139   POTASSIUM 4.1 4.3 4.0   CHLORIDE  --  108 106   CO2  --  28 27   ANIONGAP  --  4 6   GLC  --  141* 334*   BUN  --  14 14   CR  --  0.88 1.03   GFRESTIMATED  --  65 54*   GFRESTBLACK  --  76 63   DORIE  --  8.3* 8.7     No results for input(s): CHOL, HDL, LDL, TRIG, CHOLHDLRATIO in the last 168 hours.  Recent Labs   Lab 06/10/19  0419 06/10/19  0023 06/09/19 2002   TROPI <0.015 <0.015 <0.015     No results for input(s): CULT in the last 168 hours.    Imaging:  No results found for this or any previous visit (from the past 24 hour(s)).      Nick East DO MPH  [facility] Hospitalist  [address] [place] [address]  Pager: [phone]  06/11/2019

## 2019-06-11 NOTE — PLAN OF CARE
CR/OT: order received, chart reviewed. Pt at cath lab at this time. Will follow up with CR eval after procedure.

## 2019-06-11 NOTE — PROGRESS NOTES
Non-flow-limiting coronary artery disease.  Moderate in-stent restenosis in the ostial/proximal LAD stent.  The left internal mammary artery graft is occluded in its midportion most likely because the flow is so good in the native left anterior descending artery.  No significant disease in the right coronary artery or circumflex.  The discomfort does not appear to be cardiac and other sources of chest discomfort should be investigated.  We will sign off but if any cardiac questions please call.

## 2019-06-12 VITALS
WEIGHT: 188.2 LBS | TEMPERATURE: 98.5 F | DIASTOLIC BLOOD PRESSURE: 73 MMHG | SYSTOLIC BLOOD PRESSURE: 133 MMHG | HEART RATE: 59 BPM | BODY MASS INDEX: 31.36 KG/M2 | HEIGHT: 65 IN | RESPIRATION RATE: 18 BRPM | OXYGEN SATURATION: 98 %

## 2019-06-12 DIAGNOSIS — I25.10 CORONARY ARTERY DISEASE INVOLVING NATIVE CORONARY ARTERY OF NATIVE HEART WITHOUT ANGINA PECTORIS: Primary | ICD-10-CM

## 2019-06-12 LAB
GLUCOSE BLDC GLUCOMTR-MCNC: 142 MG/DL (ref 70–99)
GLUCOSE BLDC GLUCOMTR-MCNC: 151 MG/DL (ref 70–99)
POTASSIUM SERPL-SCNC: 5 MMOL/L (ref 3.4–5.3)

## 2019-06-12 PROCEDURE — 25000132 ZZH RX MED GY IP 250 OP 250 PS 637: Performed by: HOSPITALIST

## 2019-06-12 PROCEDURE — 25000132 ZZH RX MED GY IP 250 OP 250 PS 637: Performed by: INTERNAL MEDICINE

## 2019-06-12 PROCEDURE — 36415 COLL VENOUS BLD VENIPUNCTURE: CPT | Performed by: HOSPITALIST

## 2019-06-12 PROCEDURE — 99239 HOSP IP/OBS DSCHRG MGMT >30: CPT | Performed by: HOSPITALIST

## 2019-06-12 PROCEDURE — 84132 ASSAY OF SERUM POTASSIUM: CPT | Performed by: HOSPITALIST

## 2019-06-12 PROCEDURE — 25000132 ZZH RX MED GY IP 250 OP 250 PS 637: Performed by: PHYSICIAN ASSISTANT

## 2019-06-12 PROCEDURE — 00000146 ZZHCL STATISTIC GLUCOSE BY METER IP

## 2019-06-12 PROCEDURE — 40000894 ZZH STATISTIC OT IP EVAL DEFER: Performed by: STUDENT IN AN ORGANIZED HEALTH CARE EDUCATION/TRAINING PROGRAM

## 2019-06-12 RX ADMIN — VENLAFAXINE HYDROCHLORIDE 150 MG: 150 CAPSULE, EXTENDED RELEASE ORAL at 10:18

## 2019-06-12 RX ADMIN — LORATADINE 10 MG: 10 TABLET ORAL at 10:19

## 2019-06-12 RX ADMIN — AMLODIPINE BESYLATE 5 MG: 5 TABLET ORAL at 10:18

## 2019-06-12 RX ADMIN — LOSARTAN POTASSIUM 100 MG: 100 TABLET ORAL at 10:19

## 2019-06-12 RX ADMIN — Medication 12.5 MG: at 10:19

## 2019-06-12 RX ADMIN — RANITIDINE 150 MG: 150 TABLET ORAL at 10:18

## 2019-06-12 ASSESSMENT — ACTIVITIES OF DAILY LIVING (ADL)
ADLS_ACUITY_SCORE: 11

## 2019-06-12 NOTE — PHARMACY - DISCHARGE MEDICATION RECONCILIATION AND EDUCATION
Discharge medication review for this patient is complete. Face-to-face medication education was provided by the pharmacist.  See [facility] for allergy information and immunization status.   Pharmacist assisted with medication reconciliation of discharge medications with PTA medications.    Patient was informed to STOP taking the following HOME medications:   -allegra-D- contacted md to change to plain allegra    Patient was informed to START taking the following NEW medications:   -None    Patient was informed to make the following changes to prior to admission medications:  -None    Patient was educated on the following for each discharge medication:   Rationale for therapy  Duration of treatment  Dosing and or monitoring drug levels  Common side effects  Importance of compliance  Drug/food interactions  Missed doses  Self monitoring parameters    Left written materials and instructions (Clinical notes from [facility]) for new medications: No    OUTCOMES: Patient verbalized understanding    IMPORTANT FOLLOW UP NOTES:   - advised pt to use flonase daily during allergy season. Asked pt to change from allegra-D to plain allegra for her allergies. Explained that the sudafed portion of allegra-D could increase her blood pressure.    Discharge Medication List     Review of your medicines      CONTINUE these medicines which have NOT CHANGED      Dose / Directions   albuterol 108 (90 Base) MCG/ACT inhaler  Commonly known as:  PROAIR HFA/PROVENTIL HFA/VENTOLIN HFA      Dose:  2 puff  Inhale 2 puffs into the lungs every 4 hours as needed for shortness of breath / dyspnea or wheezing  Refills:  0     amLODIPine 5 MG tablet  Commonly known as:  NORVASC      Dose:  5 mg  Take 5 mg by mouth daily  Refills:  0     aspirin 81 MG EC tablet      Dose:  81 mg  Take 81 mg by mouth daily  Refills:  0     diclofenac 1 % topical gel  Commonly known as:  VOLTAREN      Dose:  2 g  Place 2 g onto the skin 4 times daily  Refills:  0      diphenoxylate-atropine 2.5-0.025 MG tablet  Commonly known as:  LOMOTIL      Dose:  1 tablet  Take 1 tablet by mouth 4 times daily as needed for diarrhea  Refills:  0     fluticasone 50 MCG/ACT nasal spray  Commonly known as:  FLONASE      Dose:  2 spray  Spray 2 sprays into both nostrils daily  Refills:  0     LORAZEPAM PO      Dose:  0.5 mg  Take 0.5 mg by mouth nightly as needed  Refills:  0     losartan 50 MG tablet  Commonly known as:  COZAAR      Dose:  50 mg  Take 50 mg by mouth daily  Refills:  0     metFORMIN 500 MG 24 hr tablet  Commonly known as:  GLUCOPHAGE-XR      Dose:  2000 mg  Take 2,000 mg by mouth daily (with dinner)  Refills:  0     metoprolol tartrate 25 MG tablet  Commonly known as:  LOPRESSOR      Dose:  12.5 mg  Take 12.5 mg by mouth 2 times daily  Refills:  0     olopatadine 0.2 % ophthalmic solution  Commonly known as:  PATADAY      Dose:  1 drop  Place 1 drop into both eyes daily as needed  Refills:  0     RANITIDINE HCL PO      Dose:  150 mg  Take 150 mg by mouth 2 times daily  Refills:  0     SIMVASTATIN PO      Dose:  20 mg  Take 20 mg by mouth At Bedtime  Refills:  0     traZODone 100 MG tablet  Commonly known as:  DESYREL      Dose:  50-75 mg  Take 50-75 mg by mouth At Bedtime  Refills:  0     venlafaxine 225 MG 24 hr tablet  Commonly known as:  EFFEXOR-ER      Dose:  150 mg  Take 150 mg by mouth daily (with breakfast)  Refills:  0        STOP taking    fexofenadine-pseudoePHEDrine 180-240 MG 24 hr tablet  Commonly known as:  ALLEGRA-D 24

## 2019-06-12 NOTE — PHARMACY
Anticoagulation coverage check.  Patient has Medicare D through [facility].    Xarelto/Eliquis  $8.50/mo    Pradaxa is non-formulary and more expensive.    Enoxaparin 100mg x 10 syringes: $3.40    Jantoven (warfarin)  3.40/mo      -TUNG Garcia, Pharmacy Technician/Liaison, Discharge Pharmacy *[phone]

## 2019-06-12 NOTE — PROGRESS NOTES
Pt discharged to home with family as transport. Discharge instructions reviewed with pt-no questions or concerns. PIV removed, tele removed and returned. No new meds at discharge.

## 2019-06-12 NOTE — DISCHARGE SUMMARY
Hospitalist Discharge Summary  [facility]    Princess Edgar MRN# [id number]   YOB: 1945 Age: 74 year old     Date of Admission:  6/9/2019  Date of Discharge:  6/12/2019  Admitting Physician:  Clarke Neumann MD  Discharge Physician:  Nick East  Discharging Service:  Hospitalist     Primary Provider: Madison Langley          Discharge Diagnosis:   Exertional chest pain, resolved and unclear etiology  Non obstructive CAD s/p distant CABG  HTN  DM  Anxiety  GERD  HLP             Discharge Disposition:   Discharged to home           Allergies:   No Known Allergies           Discharge Medications:   Current Discharge Medication List      CONTINUE these medications which have NOT CHANGED    Details   amLODIPine (NORVASC) 5 MG tablet Take 5 mg by mouth daily      aspirin 81 MG EC tablet Take 81 mg by mouth daily      diclofenac (VOLTAREN) 1 % topical gel Place 2 g onto the skin 4 times daily      fexofenadine-pseudoePHEDrine (ALLEGRA-D 24) 180-240 MG 24 hr tablet Take 1 tablet by mouth daily      fluticasone (FLONASE) 50 MCG/ACT nasal spray Spray 2 sprays into both nostrils daily      LORAZEPAM PO Take 0.5 mg by mouth nightly as needed       losartan (COZAAR) 50 MG tablet Take 50 mg by mouth daily      metFORMIN (GLUCOPHAGE-XR) 500 MG 24 hr tablet Take 2,000 mg by mouth daily (with dinner)      metoprolol tartrate (LOPRESSOR) 25 MG tablet Take 12.5 mg by mouth 2 times daily      olopatadine (PATADAY) 0.2 % ophthalmic solution Place 1 drop into both eyes daily as needed      RANITIDINE HCL PO Take 150 mg by mouth 2 times daily       SIMVASTATIN PO Take 20 mg by mouth At Bedtime       traZODone (DESYREL) 100 MG tablet Take 50-75 mg by mouth At Bedtime      venlafaxine (EFFEXOR-ER) 225 MG TB24 24 hr tablet Take 150 mg by mouth daily (with breakfast)       albuterol (PROAIR HFA/PROVENTIL HFA/VENTOLIN HFA) 108 (90 Base) MCG/ACT inhaler Inhale 2 puffs into the lungs every 4 hours as  "needed for shortness of breath / dyspnea or wheezing      diphenoxylate-atropine (LOMOTIL) 2.5-0.025 MG tablet Take 1 tablet by mouth 4 times daily as needed for diarrhea                    Condition on Discharge:   Discharge condition: Stable   Discharge vitals: Blood pressure 160/73, pulse 59, temperature 98.5  F (36.9  C), temperature source Oral, resp. rate 16, height 1.651 m (5' 5\"), weight 85.4 kg (188 lb 3.2 oz), SpO2 94 %.   Code status on discharge: Full Code      BASIC PHYSICAL EXAMINATION:  GENERAL: No apparent distress.  CARDIOVASCULAR: Regular rate and rhythm without murmurs.  PULMONARY: Clear to auscultation bilaterally.   GASTROINTESTINAL: Abdomen soft, non-tender.  EXTREMITIES: No edema, pulses intact.  NEUROLOGIC: No focal deficits.            History of Illness:   See detailed admission note for full details.               Procedures excluding imaging which is summarized below:   Please see details in the electronic medical record.           Consultations:   PHARMACY TO DOSE HEPARIN  CARDIAC REHAB IP CONSULT  CARDIOLOGY IP CONSULT  PHARMACY TO DOSE HEPARIN  ADVANCE DIRECTIVE IP CONSULT  PHARMACY DISCHARGE EDUCATION BY PHARMACIST  PHARMACY IP CONSULT  PHARMACY IP CONSULT  SMOKING CESSATION PROGRAM IP CONSULT  SMOKING CESSATION PROGRAM IP CONSULT          Significant Results:   Results for orders placed or performed during the hospital encounter of 06/09/19   Chest XR,  PA & LAT    Narrative    XR CHEST 2 VW 6/9/2019 6:04 PM    HISTORY: Pain.    COMPARISON: None.      Impression    IMPRESSION: The lungs are clear. No focal pulmonary opacities. Heart  and mediastinum are unremarkable. No acute cardiopulmonary  abnormalities. Midline sternal wires in place.     BARBI LUCIA MD       Transthoracic Echocardiogram Results:  No results found for this or any previous visit (from the past 4320 hour(s)).             Pending Results:   Unresulted Labs Ordered in the Past 30 Days of this Admission     No " orders found from 4/10/2019 to 6/10/2019.                      Discharge Instructions and Follow-Up:   Discharge instructions and follow-up:   Discharge Procedure Orders   Follow-up and recommended labs and tests    Order Comments: Follow up with primary care provider, Madison Langley, within 7 days for hospital follow- up.  No follow up labs or test are needed.     Activity   Order Comments: Your activity upon discharge: activity as tolerated     Order Specific Question Answer Comments   Is discharge order? Yes      Full Code     Order Specific Question Answer Comments   Code status determined by: Discussion with patient/legal decision maker      Diet   Order Comments: Follow this diet upon discharge: Orders Placed This Encounter      Advance Diet as Tolerated: Regular Diet Adult; Low Saturated Fat Na <2400mg Diet     Order Specific Question Answer Comments   Is discharge order? Yes              Hospital Course:   Princess Edgar is a 74 year old female with a history of hypertension, diabetes, depression, anxiety, GERD, hyperlipidemia, and coronary artery disease status post CABG about 20 years ago admitted on 6/9/2019 with exertional chest pain concerning initially for unstable angina.  She was started on IV heparin and see by cardiology in consultation.  Troponins remained negative.  She underwent coronary angio yesterday with no flow limiting lesions seen and no intervention performed.  Her symptoms are resolved and of unclear etiology.  I do not suspect PE.  Given largely unremarkable work up and resolution of symptoms we will not broaden work up.  She will follow-up with PCP in 1 week.  I have discontinued her Allergra-D given suboptimal BP control.      The patient was seen, examined, and counseled on this day. The hospitalization and plan of care was reviewed with the patient extensively. All questions were addressed and the patient agreed to follow-up as noted above.      Total time spent in face to face  contact with the patient and coordinating discharge was:  35 Minutes    Nick East DO MPH  [facility] Hospitalist  [address] [place] [address]  Pager: [phone]  06/12/2019

## 2019-06-12 NOTE — PLAN OF CARE
Pt had Coronary Angiogram today via Right groin, CMS intact, no s/s of bleeding, initially went via her left radial wrist, TR band removed at 2130, denies any pain, BP elevated, scheduled Metoprolol po, will continue with POC.

## 2019-06-12 NOTE — DISCHARGE INSTRUCTIONS
Discharge Instructions for Cardiac Catheterization  Cardiac catheterization is a procedure to look for blocked areas in the blood vessels that send blood to the heart. A thin, flexible tube (catheter) is put in a blood vessel in your groin or arm. The healthcare provider injects contrast fluid into your blood, which then flows to your heart. X-rays pictures are taken of your heart. Your provider will review the results with you. Be sure to ask any questions you have before you leave. This sheet will help you take care of yourself at home.  Home care    Don't drive or make any important decisions for at least 24 hours after receiving any type of sedation or anesthesia.     Arrange to have a responsible adult drive you home after your procedure.    Only do light and easy activities for the next 2 to 3 days. Ask for help with chores and errands while you recover. Have someone drive you to your appointments.    Don't lift anything heavy for a while. Your healthcare team will tell you when it's safe to lift again.    Ask your healthcare team when you can expect to return to work. Unless your job involves lifting, you may be able to return to your normal activities within a couple of days.    Take your medicines as directed. Don't skip doses.    Drink 6 to 8 glasses of water a day. This is to help flush the contrast dye out of your body. Call your healthcare team if your urine has any change in color.    Take your temperature each day for 7 days. If you feel cold and clammy or start sweating, take your temperature right away and call your healthcare team.    Check your incisions every day for signs of infection. These include redness, swelling, and drainage. It is normal to have a small bruise or bump where the catheter was inserted. A bruise that is getting larger is not normal and should be reported to your healthcare team. If you see blood forming in the incision, call your healthcare team. Go to the emergency  department if you have uncontrolled bleeding from the artery site. This is especially true if you take medicines that make it hard for your blood to clot. Examples are aspirin, clopidogrel, and warfarin.    Eat a healthy diet. Make sure it is low in fat, salt, and cholesterol. Ask your healthcare team for diet information.    Stop smoking. Enroll in a stop-smoking program or ask your healthcare team for help. Stop-smoking programs can be life saving.    Exercise as your healthcare team tells you to. Your healthcare team may recommend you start a cardiac rehabilitation program. Cardiac rehab is an exercise program in which trained healthcare staff watch your progress and stress on your heart while you exercise. Ask your team how to enroll.    Don't swim or take baths until your healthcare team says it s OK. You can shower the day after the procedure. Keep the site clean and dry. This keeps the incision from getting wet and infected until the skin and artery can heal.    Be sure to follow all after-care instructions.   Follow-up care    Make a follow-up appointment as advised by our staff. It's common to have a follow-up appointment 2 to 4 weeks after an angioplasty or coronary stent procedure.    Make a yearly appointment, too. This is to make sure you are still doing well and not having any new symptoms.    Don't wait for a follow-up appointment if your medicines aren't working or you are having heart-related symptoms.  When to seek medical care  Call your healthcare provider right away if you have any of the following:    Chest pain    Constant or increasing pain or numbness in your leg    Fever of 100.4 F (38.0 C) or higher, or as directed by your healthcare provider    Symptoms of infection. These include redness, swelling, drainage, or warmth at the incision site.    Shortness of breath    A leg that feels cold or appears blue    Bleeding, bruising, or a lot of swelling where the catheter was inserted    Blood  in your urine    Black or tarry stools    Any unusual bleeding   Date Last Reviewed: 10/1/2016    4511-4457 [employer]. [address] [place] [address], [place] [address]. All rights reserved. This information is not intended as a substitute for professional medical care. Always follow your healthcare professional's instructions.

## 2019-06-12 NOTE — PLAN OF CARE
Pt alert and oriented, up with standby assist in the room. Pt with angio sites to left wrist and right groin, both dry and intact, no hematoma noted, good cms and pulses. Pt hopes to discharge to home later today.

## 2019-06-12 NOTE — PLAN OF CARE
OT/CR: Chart reviewed, met with pt and discussed with pt's RN and MD, no OT/CR concerns, no evaluation/treatment provided    Discharge Planner OT   Patient plan for discharge: home today  Current status: pt admitted with chest pain, initially CR consulted for MI however no MI noted in chart, pt underwent angio however no intervention needed, appeared previous chest discomfort with non-cardiac source. Pt able to complete transfers, ADLs and short distance mobility, SBA  Barriers to return to prior living situation: none anticipated  Recommendations for discharge: home  Rationale for recommendations: no barriers to return home, pt appears to be at/close to her baseline and no concerns anticipated for her return to home and baseline activity       Entered by: Raeann Campbell 06/12/2019 9:43 AM

## 2019-06-13 ENCOUNTER — TELEPHONE (OUTPATIENT)
Dept: CARDIOLOGY | Facility: CLINIC | Age: 74
End: 2019-06-13

## 2019-06-13 DIAGNOSIS — R07.9 CHEST PAIN: Primary | ICD-10-CM

## 2019-06-13 NOTE — TELEPHONE ENCOUNTER
Writer attempted to return pt's VM, but no answer. VM left to return my phone call. RAH Alfaro RN.

## 2019-06-13 NOTE — TELEPHONE ENCOUNTER
She had a cath.   Per Dr. Waggoner:  Non-flow-limiting coronary artery disease.  Moderate in-stent restenosis in the ostial/proximal LAD stent.  The left internal mammary artery graft is occluded in its midportion most likely because the flow is so good in the native left anterior descending artery.  No significant disease in the right coronary artery or circumflex.  The discomfort does not appear to be cardiac and other sources of chest discomfort should be investigated.      She can trial SL nitroglycerin.  If it helps, we could trial low dose Imdur 15 mg.   But this seems to be noncardiac.       Catalina Benson PA-C 6/13/2019 1:51 PM

## 2019-06-13 NOTE — TELEPHONE ENCOUNTER
Patient was evaluated by cardiology while inpatient for chest pain in presence of past HX of CAD with CABG x one vessel. 6/11/19 coronary angiogram via RFA shows moderate in-stent restenosis in the ostial/proximal LAD stent, with known  to LIMA to LAD and moderate ostial/proximal LAD disease (non-flow limiting). No interventions. Attempted to call patient to discuss any post hospital d/c questions she may have, review medication changes, and confirm f/u appts, but no answer. VM left to return my phone call. RN will confirm with patient that she has an apt scheduled on 6/26/19 with DELORES India Wilson at our [place] office. RAH Alfaro RN.

## 2019-06-13 NOTE — TELEPHONE ENCOUNTER
"Pt returned pt's phone message. Denies SOB, lightheadedness, diaphoresis or nausea. States has 1/10 midsternal, non-radiating, non-reproducible \"chest pressure\" that has been ongoing for 10 minutes. Rated as 6/10 at worst. Pt has an old prescription of  SL NTG dated from 2008. Pt does not have current RX for SL NTG. States this is same chest pain that she experienced PTA, and has been only episode since discharge. Instructed pt that if chest pressure does not resolve, worsens or accompanied by symptoms, go to ED. Verbalized understanding. Will route this note to Catalina Benson to review and advise. RRA and RFA access sites are healing without signs of infection, swelling, denies fever or chills. Denies medication questions. Reviewed f/u OV as scheduled below. RAH Alfaro RN.  "

## 2019-06-17 RX ORDER — NITROGLYCERIN 0.4 MG/1
TABLET SUBLINGUAL
Qty: 30 TABLET | Refills: 1 | Status: SHIPPED | OUTPATIENT
Start: 2019-06-17

## 2019-06-17 NOTE — TELEPHONE ENCOUNTER
"Pt called back and continues to experience 1-2 minute episodes of chest pain, at time accompanied with sweatiness, some episodes \"linger\" longer. \"Yesterday I was lifting my 18 lb dog into his stroller, and I got an episode of chest pain that lasted about 1-2 minutes.\" Pt would like to trial PRN SL NTG. Writer instructed pt on PRN SL Nitroglycerin, including indications, storage and expiration period once bottle opened, administration, expected side effects and when to call the clinic vs 911. Pt verbalized understanding and RX escribed to pt's [facility] pharmacy per her request. Instructed pt to keep a symptom diary and to call us if requiring increased frequency of PRN SL NTG use. Verbalized understanding of all instructions. RAH Alfaro RN.  "

## 2019-06-26 ENCOUNTER — OFFICE VISIT (OUTPATIENT)
Dept: CARDIOLOGY | Facility: CLINIC | Age: 74
End: 2019-06-26
Attending: PHYSICIAN ASSISTANT
Payer: COMMERCIAL

## 2019-06-26 VITALS
HEART RATE: 60 BPM | HEIGHT: 65 IN | DIASTOLIC BLOOD PRESSURE: 58 MMHG | BODY MASS INDEX: 31.46 KG/M2 | WEIGHT: 188.8 LBS | SYSTOLIC BLOOD PRESSURE: 110 MMHG

## 2019-06-26 DIAGNOSIS — E78.5 HYPERLIPIDEMIA LDL GOAL <70: ICD-10-CM

## 2019-06-26 DIAGNOSIS — I25.10 CORONARY ARTERY DISEASE INVOLVING NATIVE CORONARY ARTERY OF NATIVE HEART WITHOUT ANGINA PECTORIS: ICD-10-CM

## 2019-06-26 DIAGNOSIS — I10 BENIGN ESSENTIAL HYPERTENSION: ICD-10-CM

## 2019-06-26 PROCEDURE — 99214 OFFICE O/P EST MOD 30 MIN: CPT | Performed by: NURSE PRACTITIONER

## 2019-06-26 RX ORDER — TRAZODONE HYDROCHLORIDE 100 MG/1
50 TABLET ORAL AT BEDTIME
COMMUNITY
Start: 2019-04-10 | End: 2022-01-13

## 2019-06-26 RX ORDER — ASPIRIN 325 MG
81 TABLET ORAL DAILY
COMMUNITY
End: 2019-06-26

## 2019-06-26 RX ORDER — LORATADINE 10 MG/1
10 TABLET ORAL DAILY
Status: ON HOLD | COMMUNITY
End: 2019-11-19

## 2019-06-26 ASSESSMENT — MIFFLIN-ST. JEOR: SCORE: 1367.27

## 2019-06-26 NOTE — PROGRESS NOTES
Cardiology Clinic Progress Note  Princess Edgar MRN# [id number]   YOB: 1945 Age: 74 year old     Reason For Visit:  Hospital follow-up   Primary Cardiologist:   Dr. Waggoner          History of Presenting Illness:    Princess Edgar is a pleasant 74 year old patient who carries a past medical history significant for artery disease status post CABG greater than 20 years ago, hypertension, diabetes, depression, anxiety, GERD, and hyperlipidemia.     On 6/9/2019, she presented to the emergency room with exertional chest pain concerning for unstable angina.  Cardiac work-up consisted of negative troponins, and EKG with nonspecific anterior and inferior T wave changes.  Echocardiogram showed a preserved ejection fraction and no wall motion abnormalities.  Due to her recurrent typical anginal symptoms, she underwent a coronary angiogram that demonstrated no flow-limiting lesions, moderate in-stent restenosis in the ostial proximal LAD stent.  LIMA graft is occluded at its midportion likely due to the flow that is good to the native LAD.  No significant disease in the RCA or circumflex.  Chest pain was not felt to be cardiac related and was recommended she seek out noncardiac causes of her chest discomfort.  She comes to the office today for hospital follow-up.    She is feeling well on a cardiac standpoint, denies chest pain, shortness of breath, PND, orthopnea, presyncope, syncope, edema, heart racing, or palpitations. She admits to a single episode of chest pain since discharge, relieved by nitroglycerin. She admits to being under a great deal of personal stress after the passing of her second  and being the primary caretaker of her elderly mother. She does have 3 other siblings who are now assisting with their mother care.     Blood pressure was initially low at 100/60 with repeat reading 110/59.   She admits to not taking her amlodipine this am due to persistent stomach upset.   Last BMP  showed a sodium of 140, potassium 5.0, BUN 14, creatinine 0.88, GFR 65  Hemoglobin 12.9 platelet 277    Lipids profile 6/7/2018 showed a total cholesterol 149, triglycerides 89, HDL 60, LDL 71  BMI 31.42    She does not engage in any routine exercise, primarily since her knee surgery  Follows a heart healthy diet  Remains compliant with all medications.                   Assessment and Plan:     1.  Coronary artery disease -status post remote stent to LAD followed by single-vessel CABG with LIMA to the LAD 1999.  Recent coronary angiogram demonstrated no flow-limiting lesions, moderate in-stent restenosis in the ostial proximal LAD stent.  LIMA graft is occluded at its midportion likely due to the flow that is good to the native LAD.  No significant disease in the RCA or circumflex.  Encourage exercise, weight loss.  10 you on current medical therapy.  Recommend finding effective ways to reduce her stress.    2.  Hypertension -borderline low today, 100/60 with repeat reading 110/59,  asymptomatic.  Due to reported stomach upset with amlodipine as well as borderline hypotension,  recommend she decrease to 2.5 mg daily and monitor symptoms.  Continue on all other medical therapy    3.  Hyperlipidemia -due for lipids, last LDL at goal.  Continue on statin  4.  Diabetes -followed by primary.  Last A1c 6.2, managed on metformin             Thank you for allowing me to participate in this delightful patient's care. I have recommended she follow up in 6 months with Dr. Waggoner to establish care..       SKYE Fernandez, CNP          Review of Systems:   Review of Systems:  Skin:  Negative     Eyes:  Positive for glasses  ENT:  Positive for hearing loss  Respiratory:  Negative    Cardiovascular:    Positive for;chest pain;palpitations  Gastroenterology: Negative    Genitourinary:       Musculoskeletal:  Positive for arthritis  Neurologic:  Negative    Psychiatric:  Positive for sleep disturbances  Heme/Lymph/Imm:   Negative    Endocrine:  Positive for diabetes              Physical Exam:     GEN:  In general, this is a overweight female in no acute distress.  HEENT:  Pupils equal, round. Sclerae nonicteric. Clear oropharynx. Mucous membranes moist.  NECK: Supple, no masses appreciated. Trachea midline.  No JVD   C/V:  Regular rate and rhythm, systolic murmur, rub or gallop. No S3 or RV heave.   RESP: Respirations are unlabored. No use of accessory muscles. Clear to auscultation bilaterally without wheezing, rales, or rhonchi.  GI: Abdomen soft, nontender, nondistended. No HSM appreciated.   EXTREM: No LE edema. No cyanosis or clubbing.  NEURO: Alert and oriented, cooperative. No obvious focal deficits.   PSYCH: Normal affect.  SKIN: Warm and dry. No rashes or petechiae appreciated.          Past Medical History:     Past Medical History:   Diagnosis Date     Anxiety      Arthritis      Diabetes (H)      Glaucoma (increased eye pressure)      Hypertension               Past Surgical History:     Past Surgical History:   Procedure Laterality Date     CARDIAC SURGERY       CV ANGIOGRAM CORONARY GRAFT N/A 6/11/2019    Procedure: Angiogram Coronary Graft;  Surgeon: Hayden Muñiz MD;  Location:  HEART CARDIAC CATH LAB     CV INSTANTANEOUS WAVE-FREE RATIO N/A 6/11/2019    Procedure: Instantaneous Wave-Free Ratio;  Surgeon: Hayden Muñiz MD;  Location:  HEART CARDIAC CATH LAB     CV LEFT HEART CATH N/A 6/11/2019    Procedure: Left Heart Cath;  Surgeon: Hayden Muñiz MD;  Location:  HEART CARDIAC CATH LAB     EYE SURGERY       ORTHOPEDIC SURGERY                Allergies:   Amoxicillin-pot clavulanate and Lisinopril       Data:   All laboratory data reviewed:    LAST BMP:  Lab Results   Component Value Date     06/10/2019      Lab Results   Component Value Date    POTASSIUM 5.0 06/12/2019     Lab Results   Component Value Date    CHLORIDE 108 06/10/2019     Lab Results   Component Value Date    DORIE 8.3 06/10/2019      Lab Results   Component Value Date    CO2 28 06/10/2019     Lab Results   Component Value Date    BUN 14 06/10/2019     Lab Results   Component Value Date    CR 0.88 06/10/2019     Lab Results   Component Value Date     06/10/2019       LAST CBC:  Lab Results   Component Value Date    WBC 8.3 06/10/2019     Lab Results   Component Value Date    RBC 4.27 06/10/2019     Lab Results   Component Value Date    HGB 12.9 06/10/2019     Lab Results   Component Value Date    HCT 41.4 06/10/2019     Lab Results   Component Value Date    MCV 97 06/10/2019     Lab Results   Component Value Date    MCH 30.2 06/10/2019     Lab Results   Component Value Date    MCHC 31.2 06/10/2019     Lab Results   Component Value Date    RDW 13.2 06/10/2019     Lab Results   Component Value Date     06/10/2019

## 2019-06-26 NOTE — LETTER
6/26/2019    [facility] [address] [place] [address]    RE: Princess Edgar       Dear Colleague,    I had the pleasure of seeing Princess Edgar in the [facility].    Cardiology Clinic Progress Note  Princess Edgar MRN# [id number]   YOB: 1945 Age: 74 year old     Reason For Visit:  Hospital follow-up   Primary Cardiologist:   Dr. Waggoner          History of Presenting Illness:    Princess Edgar is a pleasant 74 year old patient who carries a past medical history significant for artery disease status post CABG greater than 20 years ago, hypertension, diabetes, depression, anxiety, GERD, and hyperlipidemia.     On 6/9/2019, she presented to the emergency room with exertional chest pain concerning for unstable angina.  Cardiac work-up consisted of negative troponins, and EKG with nonspecific anterior and inferior T wave changes.  Echocardiogram showed a preserved ejection fraction and no wall motion abnormalities.  Due to her recurrent typical anginal symptoms, she underwent a coronary angiogram that demonstrated no flow-limiting lesions, moderate in-stent restenosis in the ostial proximal LAD stent.  LIMA graft is occluded at its midportion likely due to the flow that is good to the native LAD.  No significant disease in the RCA or circumflex.  Chest pain was not felt to be cardiac related and was recommended she seek out noncardiac causes of her chest discomfort.  She comes to the office today for hospital follow-up.    She is feeling well on a cardiac standpoint, denies chest pain, shortness of breath, PND, orthopnea, presyncope, syncope, edema, heart racing, or palpitations. She admits to a single episode of chest pain since discharge, relieved by nitroglycerin. She admits to being under a great deal of personal stress after the passing of her second  and being the primary caretaker of her elderly mother. She does have 3  other siblings who are now assisting with their mother care.     Blood pressure was initially low at 100/60 with repeat reading 110/59.   She admits to not taking her amlodipine this am due to persistent stomach upset.   Last BMP showed a sodium of 140, potassium 5.0, BUN 14, creatinine 0.88, GFR 65  Hemoglobin 12.9 platelet 277    Lipids profile 6/7/2018 showed a total cholesterol 149, triglycerides 89, HDL 60, LDL 71  BMI 31.42    She does not engage in any routine exercise, primarily since her knee surgery  Follows a heart healthy diet  Remains compliant with all medications.                   Assessment and Plan:     1.  Coronary artery disease -status post remote stent to LAD followed by single-vessel CABG with LIMA to the LAD 1999.  Recent coronary angiogram demonstrated no flow-limiting lesions, moderate in-stent restenosis in the ostial proximal LAD stent.  LIMA graft is occluded at its midportion likely due to the flow that is good to the native LAD.  No significant disease in the RCA or circumflex.  Encourage exercise, weight loss.  10 you on current medical therapy.  Recommend finding effective ways to reduce her stress.    2.  Hypertension -borderline low today, 100/60 with repeat reading 110/59,  asymptomatic.  Due to reported stomach upset with amlodipine as well as borderline hypotension,  recommend she decrease to 2.5 mg daily and monitor symptoms.  Continue on all other medical therapy    3.  Hyperlipidemia -due for lipids, last LDL at goal.  Continue on statin  4.  Diabetes -followed by primary.  Last A1c 6.2, managed on metformin             Thank you for allowing me to participate in this delightful patient's care. I have recommended she follow up in 6 months with Dr. Waggoner to establish care..       India Wilson, SKYE, CNP          Review of Systems:   Review of Systems:  Skin:  Negative     Eyes:  Positive for glasses  ENT:  Positive for hearing loss  Respiratory:  Negative     Cardiovascular:    Positive for;chest pain;palpitations  Gastroenterology: Negative    Genitourinary:       Musculoskeletal:  Positive for arthritis  Neurologic:  Negative    Psychiatric:  Positive for sleep disturbances  Heme/Lymph/Imm:  Negative    Endocrine:  Positive for diabetes              Physical Exam:     GEN:  In general, this is a overweight female in no acute distress.  HEENT:  Pupils equal, round. Sclerae nonicteric. Clear oropharynx. Mucous membranes moist.  NECK: Supple, no masses appreciated. Trachea midline.  No JVD   C/V:  Regular rate and rhythm, systolic murmur, rub or gallop. No S3 or RV heave.   RESP: Respirations are unlabored. No use of accessory muscles. Clear to auscultation bilaterally without wheezing, rales, or rhonchi.  GI: Abdomen soft, nontender, nondistended. No HSM appreciated.   EXTREM: No LE edema. No cyanosis or clubbing.  NEURO: Alert and oriented, cooperative. No obvious focal deficits.   PSYCH: Normal affect.  SKIN: Warm and dry. No rashes or petechiae appreciated.          Past Medical History:     Past Medical History:   Diagnosis Date     Anxiety      Arthritis      Diabetes (H)      Glaucoma (increased eye pressure)      Hypertension               Past Surgical History:     Past Surgical History:   Procedure Laterality Date     CARDIAC SURGERY       CV ANGIOGRAM CORONARY GRAFT N/A 6/11/2019    Procedure: Angiogram Coronary Graft;  Surgeon: Hayden Muñiz MD;  Location:  HEART CARDIAC CATH LAB     CV INSTANTANEOUS WAVE-FREE RATIO N/A 6/11/2019    Procedure: Instantaneous Wave-Free Ratio;  Surgeon: Hayden Muñiz MD;  Location:  HEART CARDIAC CATH LAB     CV LEFT HEART CATH N/A 6/11/2019    Procedure: Left Heart Cath;  Surgeon: Hayden Muñiz MD;  Location:  HEART CARDIAC CATH LAB     EYE SURGERY       ORTHOPEDIC SURGERY                Allergies:   Amoxicillin-pot clavulanate and Lisinopril       Data:   All laboratory data reviewed:    LAST BMP:  Lab  Results   Component Value Date     06/10/2019      Lab Results   Component Value Date    POTASSIUM 5.0 06/12/2019     Lab Results   Component Value Date    CHLORIDE 108 06/10/2019     Lab Results   Component Value Date    DORIE 8.3 06/10/2019     Lab Results   Component Value Date    CO2 28 06/10/2019     Lab Results   Component Value Date    BUN 14 06/10/2019     Lab Results   Component Value Date    CR 0.88 06/10/2019     Lab Results   Component Value Date     06/10/2019       LAST CBC:  Lab Results   Component Value Date    WBC 8.3 06/10/2019     Lab Results   Component Value Date    RBC 4.27 06/10/2019     Lab Results   Component Value Date    HGB 12.9 06/10/2019     Lab Results   Component Value Date    HCT 41.4 06/10/2019     Lab Results   Component Value Date    MCV 97 06/10/2019     Lab Results   Component Value Date    MCH 30.2 06/10/2019     Lab Results   Component Value Date    MCHC 31.2 06/10/2019     Lab Results   Component Value Date    RDW 13.2 06/10/2019     Lab Results   Component Value Date     06/10/2019         Thank you for allowing me to participate in the care of your patient.    Sincerely,     SKYE Fernandez [facility]

## 2019-06-26 NOTE — PATIENT INSTRUCTIONS
Thanks for coming into [facility] today.    Doing well on a cardiac standpoint  Blood pressure borderline low today.   Complaints of stomach upset with amlodipine  Reduce amlodipine to 2.5mg daily. Monitor blood pressures daily with a goal < 130/80.  Encourage activity.   Find ways to effectively reduce stress   Follow up with Dr. Waggoner to [facility] care.       Please call my nurse at [phone] with any questions or concerns.    Scheduling phone number: [phone]  Reminder: Please bring in all current medications, over the counter supplements and vitamin bottles to your next appointment.

## 2019-10-02 ENCOUNTER — HEALTH MAINTENANCE LETTER (OUTPATIENT)
Age: 74
End: 2019-10-02

## 2019-11-19 ENCOUNTER — APPOINTMENT (OUTPATIENT)
Dept: GENERAL RADIOLOGY | Facility: CLINIC | Age: 74
End: 2019-11-19
Attending: EMERGENCY MEDICINE
Payer: COMMERCIAL

## 2019-11-19 ENCOUNTER — HOSPITAL ENCOUNTER (OUTPATIENT)
Facility: CLINIC | Age: 74
Setting detail: OBSERVATION
Discharge: HOME | End: 2019-11-21
Attending: EMERGENCY MEDICINE | Admitting: HOSPITALIST
Payer: COMMERCIAL

## 2019-11-19 DIAGNOSIS — R07.9 CHEST PAIN, UNSPECIFIED TYPE: ICD-10-CM

## 2019-11-19 DIAGNOSIS — R55 SYNCOPE, UNSPECIFIED SYNCOPE TYPE: ICD-10-CM

## 2019-11-19 LAB
ANION GAP SERPL CALCULATED.3IONS-SCNC: 5 MMOL/L (ref 3–14)
BUN SERPL-MCNC: 19 MG/DL (ref 7–30)
CALCIUM SERPL-MCNC: 8.3 MG/DL (ref 8.5–10.1)
CHLORIDE SERPL-SCNC: 107 MMOL/L (ref 94–109)
CO2 SERPL-SCNC: 28 MMOL/L (ref 20–32)
CREAT SERPL-MCNC: 1.13 MG/DL (ref 0.52–1.04)
ERYTHROCYTE [DISTWIDTH] IN BLOOD BY AUTOMATED COUNT: 13.2 % (ref 10–15)
GFR SERPL CREATININE-BSD FRML MDRD: 48 ML/MIN/{1.73_M2}
GLUCOSE BLDC GLUCOMTR-MCNC: 112 MG/DL (ref 70–99)
GLUCOSE SERPL-MCNC: 215 MG/DL (ref 70–99)
HCT VFR BLD AUTO: 39.8 % (ref 35–47)
HGB BLD-MCNC: 12.5 G/DL (ref 11.7–15.7)
MCH RBC QN AUTO: 30.5 PG (ref 26.5–33)
MCHC RBC AUTO-ENTMCNC: 31.4 G/DL (ref 31.5–36.5)
MCV RBC AUTO: 97 FL (ref 78–100)
NT-PROBNP SERPL-MCNC: 179 PG/ML (ref 0–900)
PLATELET # BLD AUTO: 256 10E9/L (ref 150–450)
POTASSIUM SERPL-SCNC: 4.1 MMOL/L (ref 3.4–5.3)
RBC # BLD AUTO: 4.1 10E12/L (ref 3.8–5.2)
SODIUM SERPL-SCNC: 140 MMOL/L (ref 133–144)
TROPONIN I SERPL-MCNC: <0.015 UG/L (ref 0–0.04)
WBC # BLD AUTO: 8 10E9/L (ref 4–11)

## 2019-11-19 PROCEDURE — G0378 HOSPITAL OBSERVATION PER HR: HCPCS

## 2019-11-19 PROCEDURE — 00000146 ZZHCL STATISTIC GLUCOSE BY METER IP

## 2019-11-19 PROCEDURE — 99285 EMERGENCY DEPT VISIT HI MDM: CPT | Mod: 25

## 2019-11-19 PROCEDURE — 80048 BASIC METABOLIC PNL TOTAL CA: CPT | Performed by: EMERGENCY MEDICINE

## 2019-11-19 PROCEDURE — 71046 X-RAY EXAM CHEST 2 VIEWS: CPT

## 2019-11-19 PROCEDURE — 83880 ASSAY OF NATRIURETIC PEPTIDE: CPT | Performed by: EMERGENCY MEDICINE

## 2019-11-19 PROCEDURE — 36415 COLL VENOUS BLD VENIPUNCTURE: CPT | Performed by: PHYSICIAN ASSISTANT

## 2019-11-19 PROCEDURE — 84484 ASSAY OF TROPONIN QUANT: CPT | Performed by: PHYSICIAN ASSISTANT

## 2019-11-19 PROCEDURE — 85027 COMPLETE CBC AUTOMATED: CPT | Performed by: EMERGENCY MEDICINE

## 2019-11-19 PROCEDURE — 84484 ASSAY OF TROPONIN QUANT: CPT | Performed by: EMERGENCY MEDICINE

## 2019-11-19 PROCEDURE — 99219 ZZC INITIAL OBSERVATION CARE,LEVL II: CPT | Performed by: PHYSICIAN ASSISTANT

## 2019-11-19 PROCEDURE — 25000132 ZZH RX MED GY IP 250 OP 250 PS 637: Performed by: PHYSICIAN ASSISTANT

## 2019-11-19 PROCEDURE — 93005 ELECTROCARDIOGRAM TRACING: CPT

## 2019-11-19 RX ORDER — MORPHINE SULFATE 2 MG/ML
1 INJECTION, SOLUTION INTRAMUSCULAR; INTRAVENOUS
Status: DISCONTINUED | OUTPATIENT
Start: 2019-11-19 | End: 2019-11-21 | Stop reason: HOSPADM

## 2019-11-19 RX ORDER — ASPIRIN 81 MG/1
81 TABLET ORAL DAILY
Status: DISCONTINUED | OUTPATIENT
Start: 2019-11-20 | End: 2019-11-21 | Stop reason: HOSPADM

## 2019-11-19 RX ORDER — NITROGLYCERIN 0.4 MG/1
0.4 TABLET SUBLINGUAL EVERY 5 MIN PRN
Status: DISCONTINUED | OUTPATIENT
Start: 2019-11-19 | End: 2019-11-21 | Stop reason: HOSPADM

## 2019-11-19 RX ORDER — VENLAFAXINE HYDROCHLORIDE 150 MG/1
150 CAPSULE, EXTENDED RELEASE ORAL DAILY
COMMUNITY

## 2019-11-19 RX ORDER — TRAZODONE HYDROCHLORIDE 100 MG/1
100 TABLET ORAL AT BEDTIME
Status: DISCONTINUED | OUTPATIENT
Start: 2019-11-19 | End: 2019-11-20

## 2019-11-19 RX ORDER — AMLODIPINE BESYLATE 5 MG/1
5 TABLET ORAL DAILY
Status: DISCONTINUED | OUTPATIENT
Start: 2019-11-20 | End: 2019-11-21 | Stop reason: HOSPADM

## 2019-11-19 RX ORDER — ATORVASTATIN CALCIUM 20 MG/1
20 TABLET, FILM COATED ORAL AT BEDTIME
COMMUNITY
End: 2023-05-11

## 2019-11-19 RX ORDER — DEXTROSE MONOHYDRATE 25 G/50ML
25-50 INJECTION, SOLUTION INTRAVENOUS
Status: DISCONTINUED | OUTPATIENT
Start: 2019-11-19 | End: 2019-11-21 | Stop reason: HOSPADM

## 2019-11-19 RX ORDER — NALOXONE HYDROCHLORIDE 0.4 MG/ML
.1-.4 INJECTION, SOLUTION INTRAMUSCULAR; INTRAVENOUS; SUBCUTANEOUS
Status: DISCONTINUED | OUTPATIENT
Start: 2019-11-19 | End: 2019-11-21 | Stop reason: HOSPADM

## 2019-11-19 RX ORDER — VENLAFAXINE HYDROCHLORIDE 150 MG/1
150 CAPSULE, EXTENDED RELEASE ORAL DAILY
Status: DISCONTINUED | OUTPATIENT
Start: 2019-11-20 | End: 2019-11-21 | Stop reason: HOSPADM

## 2019-11-19 RX ORDER — LIDOCAINE 40 MG/G
CREAM TOPICAL
Status: DISCONTINUED | OUTPATIENT
Start: 2019-11-19 | End: 2019-11-21 | Stop reason: HOSPADM

## 2019-11-19 RX ORDER — NICOTINE POLACRILEX 4 MG
15-30 LOZENGE BUCCAL
Status: DISCONTINUED | OUTPATIENT
Start: 2019-11-19 | End: 2019-11-21 | Stop reason: HOSPADM

## 2019-11-19 RX ORDER — FEXOFENADINE HCL AND PSEUDOEPHEDRINE HCI 60; 120 MG/1; MG/1
1 TABLET, EXTENDED RELEASE ORAL 2 TIMES DAILY
COMMUNITY

## 2019-11-19 RX ORDER — ALUMINA, MAGNESIA, AND SIMETHICONE 2400; 2400; 240 MG/30ML; MG/30ML; MG/30ML
20 SUSPENSION ORAL EVERY 4 HOURS PRN
Status: DISCONTINUED | OUTPATIENT
Start: 2019-11-19 | End: 2019-11-21 | Stop reason: HOSPADM

## 2019-11-19 RX ORDER — ACETAMINOPHEN 325 MG/1
650 TABLET ORAL EVERY 4 HOURS PRN
Status: DISCONTINUED | OUTPATIENT
Start: 2019-11-19 | End: 2019-11-21 | Stop reason: HOSPADM

## 2019-11-19 RX ORDER — LOSARTAN POTASSIUM 50 MG/1
50 TABLET ORAL DAILY
Status: DISCONTINUED | OUTPATIENT
Start: 2019-11-20 | End: 2019-11-21 | Stop reason: HOSPADM

## 2019-11-19 RX ORDER — ATORVASTATIN CALCIUM 20 MG/1
20 TABLET, FILM COATED ORAL AT BEDTIME
Status: DISCONTINUED | OUTPATIENT
Start: 2019-11-19 | End: 2019-11-21 | Stop reason: HOSPADM

## 2019-11-19 RX ORDER — FAMOTIDINE 20 MG/1
20 TABLET, FILM COATED ORAL 2 TIMES DAILY
Status: DISCONTINUED | OUTPATIENT
Start: 2019-11-19 | End: 2019-11-20

## 2019-11-19 RX ADMIN — TRAZODONE HYDROCHLORIDE 100 MG: 100 TABLET ORAL at 22:25

## 2019-11-19 RX ADMIN — ATORVASTATIN CALCIUM 20 MG: 20 TABLET, FILM COATED ORAL at 22:25

## 2019-11-19 RX ADMIN — FAMOTIDINE 20 MG: 20 TABLET, FILM COATED ORAL at 22:25

## 2019-11-19 ASSESSMENT — MIFFLIN-ST. JEOR
SCORE: 1311.47
SCORE: 1288.79

## 2019-11-19 ASSESSMENT — ENCOUNTER SYMPTOMS
SHORTNESS OF BREATH: 0
NAUSEA: 0

## 2019-11-19 NOTE — ED PROVIDER NOTES
"  History     Chief Complaint:  Chest Pain      HPI   Princess Edgar is a 74 year old female who presents with right-sided chest pain in the setting of recent cardiac catheterization on 6/9/19, results below. Tonight, patient started to get a pressure-type chest pain while she was sitting on her bed making a phone call about one hour ago. She then got up and took a nitroglycerin at 1630. This resolved her pain to her memory. She then got up again when a friend came to the door.  She stood up and walked to the door.  As she stood up she started to feel lightheaded and dizzy.  She quickly sat down into a chair and that is when she lost consciousness for \"a few seconds.\" Patient felt normal when she got up. This pain is different than before her previous catheterization and different than her indigestion. Patient denies shortness of breath and nausea.     Cardiac catheterization, 6/9/19:  1. Chronically occluded LIMA to LAD and moderate ostial/proximal LAD disease (non-flow limiting)  2. Moderately elevated left heart filling pressure (LVEDP 22 mmHg)    Cardiac Risk Factors   Sex: Female   Tobacco: Former Smoker   Hypertension: Positive  Diabetes: Positive  Hyperlipidemia: Negative  Family History: Negative    PE/DVT Risk Factors   Personal History: Negative  Recent Travel: Negative   Recent Surgery/Hospitalization: Negative  Tobacco: Negative  Family History: Negative  Hormone Use: Negative   Cancer: Negative  Trauma: Negative      Allergies:  Augmentin   Lisinopril      Medications:    Albuterol   Amlodipine  Aspirin 81 mg  Diclofenac  Flonase  Lomotil   Loratadine   Lorazepam   Losartan   Metformin   Metoprolol   Nitroglycerin   Olopatadine   Ranitidine   Simvastatin   Trazodone   Venlafaxine      Past Medical History:    ACS  Anxiety   Arthritis  DM type II  Glaucoma  HTN    Past Surgical History:    CV angiogram   Eye surgery   Orthopedic surgery    Family History:    History reviewed. No pertinent family " "history.    Social History:  Presents to the ED by herself.   Tobacco Use: Former smoker (Quit: 1967)  Alcohol Use: Yes  PCP: Madison Langley  Marital Status:  Single [1]     Review of Systems   Respiratory: Negative for shortness of breath.    Cardiovascular: Positive for chest pain.   Gastrointestinal: Negative for nausea.   Neurological: Positive for syncope.   All other systems reviewed and are negative.      Physical Exam   First Vitals:  BP: 123/68  Pulse: 56  Heart Rate: 55  Temp: 98  F (36.7  C)  Resp: 16  Height: 162.6 cm (5' 4\")  Weight: 79.4 kg (175 lb)  SpO2: 95 %      Physical Exam    General:   Pleasant, age appropriate.  HEENT:    Oropharynx is moist.  Eyes:    Conjunctiva normal  Neck:    Supple, no meningismus.     CV:     Regular rate and rhythm.      No murmurs, rubs or gallops.       No unilateral leg swelling.       2+ radial pulses bilateral.       No lower extremity edema.  PULM:    Clear to auscultation bilateral.       No respiratory distress.      Good air exchange.     No rales or wheezing.     No stridor.  ABD:    Soft, non-tender, non-distended.       No pulsatile masses.       No rebound, guarding or rigidity.  MSK:     No gross deformity to all four extremities.   LYMPH:   No cervical lymphadenopathy.  NEURO:   Alert. Good muscle tone, no atrophy.  Skin:    Warm, dry and intact.    Psych:    Mood is good and affect is appropriate.        Emergency Department Course   ECG:  @ 1710  Indication: chest pain  Vent. Rate 54 bpm. WV interval 182 ms. QRS duration 82 ms. QT/QTc 438/415 ms. P-R-T axis 37 49 76.   Sinus bradycardia. Nonspecific ST and T wave abnormality. T wave inversion in V1-V2. Abnormal ECG.    Read @ 1719 by Dr. Scherer.    Imaging:  Radiographic findings were communicated with the patient who voiced understanding of the findings.    Chest XR,  PA & LAT   Final Result   IMPRESSION: Prominence of the interstitial markings. No consolidation. Heart size normal postop median " sternotomy. ASCVD aorta.        Laboratory:  CBC:  WBC 8.0, HGB 12.5, , otherwise WNL  BMP: Creatinine 1.13 (H), GFR 48 (L), glucose 215 (H), otherwise WNL  (1756) Troponin: <0.015    Emergency Department Course:  The patient arrived in triage where vitals were measured and recorded.   The patient was then escorted back to the emergency department.   The patient's medical records were reviewed.  Nursing notes and vitals were reviewed.  1721: I performed an exam of the patient as documented above.    EKG obtained in the ED, see results above.   IV was inserted and blood was drawn for laboratory testing, results above.  The patient was sent for a chest XR while in the emergency department, results above.   1848: I rechecked the patient and discussed results.    Findings and plan explained to the patient who consents to admission.   1858: I discussed the patient with BOLIVAR Lassiter of the hospitalist service, who will admit the patient to an obs bed under Dr. Stroud for further monitoring, evaluation, and treatment.       Impression & Plan      Medical Decision Makin-year-old female presented to the ED with right-sided chest pain.  She has a history of known coronary artery disease although recent cardiac catheterization revealed nonobstructive coronary disease.  She was evaluated for ACS in which EKG reveals novel T wave inversion in V1 and 2.  Troponin is within normal limits but symptoms have been short-lived.  324 mg of aspirin were given prehospital.  She had no recurrent pain to require additional nitroglycerin.  Patient will be transferred to a cardiac bed for further evaluation.  She has no features concerning for pulmonary embolus, aortic dissection or aortic aneurysm.    In regards to her syncope, this most likely represented orthostatic hypotension likely related to position change and recent administration of nitroglycerin.  Low suspicion for transient dysrhythmia.  We will continue to  monitor but no dysrhythmia noted in the ED.    Diagnosis:    ICD-10-CM    1. Chest pain, unspecified type R07.9    2. Syncope, unspecified syncope type R55        Disposition:  Admitted to an obs bed.         EMA, Latricia Pedersen, am serving as a scribe on 11/19/2019 at 5:21 PM to peersonally document services performed by Dr. Scherer based on my observations and the provider's statements to me.   [facility] EMERGENCY DEPARTMENT       Ranjan Scherer MD  11/19/19 0568

## 2019-11-19 NOTE — ED NOTES
Bed: ED20  Expected date: 11/19/19  Expected time: 4:59 PM  Means of arrival: Ambulance  Comments:  CHARLES 3 74Fchest pain

## 2019-11-20 ENCOUNTER — APPOINTMENT (OUTPATIENT)
Dept: CARDIOLOGY | Facility: CLINIC | Age: 74
End: 2019-11-20
Attending: INTERNAL MEDICINE
Payer: COMMERCIAL

## 2019-11-20 ENCOUNTER — APPOINTMENT (OUTPATIENT)
Dept: CARDIOLOGY | Facility: CLINIC | Age: 74
End: 2019-11-20
Attending: PHYSICIAN ASSISTANT
Payer: COMMERCIAL

## 2019-11-20 LAB
GLUCOSE BLDC GLUCOMTR-MCNC: 105 MG/DL (ref 70–99)
GLUCOSE BLDC GLUCOMTR-MCNC: 124 MG/DL (ref 70–99)
GLUCOSE BLDC GLUCOMTR-MCNC: 92 MG/DL (ref 70–99)
GLUCOSE BLDC GLUCOMTR-MCNC: 95 MG/DL (ref 70–99)
GLUCOSE BLDC GLUCOMTR-MCNC: 99 MG/DL (ref 70–99)
INTERPRETATION ECG - MUSE: NORMAL
TROPONIN I SERPL-MCNC: <0.015 UG/L (ref 0–0.04)
TROPONIN I SERPL-MCNC: <0.015 UG/L (ref 0–0.04)

## 2019-11-20 PROCEDURE — 93306 TTE W/DOPPLER COMPLETE: CPT | Mod: 26 | Performed by: INTERNAL MEDICINE

## 2019-11-20 PROCEDURE — 99225 ZZC SUBSEQUENT OBSERVATION CARE,LEVEL II: CPT | Performed by: PHYSICIAN ASSISTANT

## 2019-11-20 PROCEDURE — 00000146 ZZHCL STATISTIC GLUCOSE BY METER IP

## 2019-11-20 PROCEDURE — 25000132 ZZH RX MED GY IP 250 OP 250 PS 637: Performed by: PHYSICIAN ASSISTANT

## 2019-11-20 PROCEDURE — 93321 DOPPLER ECHO F-UP/LMTD STD: CPT | Mod: 26 | Performed by: INTERNAL MEDICINE

## 2019-11-20 PROCEDURE — 93016 CV STRESS TEST SUPVJ ONLY: CPT | Performed by: INTERNAL MEDICINE

## 2019-11-20 PROCEDURE — 93325 DOPPLER ECHO COLOR FLOW MAPG: CPT | Mod: 26 | Performed by: INTERNAL MEDICINE

## 2019-11-20 PROCEDURE — G0378 HOSPITAL OBSERVATION PER HR: HCPCS

## 2019-11-20 PROCEDURE — 99214 OFFICE O/P EST MOD 30 MIN: CPT | Mod: 25 | Performed by: INTERNAL MEDICINE

## 2019-11-20 PROCEDURE — 93018 CV STRESS TEST I&R ONLY: CPT | Performed by: INTERNAL MEDICINE

## 2019-11-20 PROCEDURE — 36415 COLL VENOUS BLD VENIPUNCTURE: CPT | Performed by: PHYSICIAN ASSISTANT

## 2019-11-20 PROCEDURE — 93350 STRESS TTE ONLY: CPT | Mod: 26 | Performed by: INTERNAL MEDICINE

## 2019-11-20 PROCEDURE — 93306 TTE W/DOPPLER COMPLETE: CPT | Mod: 59

## 2019-11-20 PROCEDURE — 96361 HYDRATE IV INFUSION ADD-ON: CPT

## 2019-11-20 PROCEDURE — 84484 ASSAY OF TROPONIN QUANT: CPT | Performed by: PHYSICIAN ASSISTANT

## 2019-11-20 PROCEDURE — 40000556 ZZH STATISTIC PERIPHERAL IV START W US GUIDANCE

## 2019-11-20 PROCEDURE — 25800030 ZZH RX IP 258 OP 636: Performed by: PHYSICIAN ASSISTANT

## 2019-11-20 PROCEDURE — 40000264 ECHO STRESS ECHOCARDIOGRAM

## 2019-11-20 PROCEDURE — 25500064 ZZH RX 255 OP 636: Performed by: HOSPITALIST

## 2019-11-20 PROCEDURE — 96360 HYDRATION IV INFUSION INIT: CPT

## 2019-11-20 RX ORDER — DEXTROSE MONOHYDRATE 25 G/50ML
25-50 INJECTION, SOLUTION INTRAVENOUS
Status: DISCONTINUED | OUTPATIENT
Start: 2019-11-20 | End: 2019-11-20

## 2019-11-20 RX ORDER — NICOTINE POLACRILEX 4 MG
15-30 LOZENGE BUCCAL
Status: DISCONTINUED | OUTPATIENT
Start: 2019-11-20 | End: 2019-11-20

## 2019-11-20 RX ORDER — METFORMIN HCL 500 MG
1000 TABLET, EXTENDED RELEASE 24 HR ORAL 2 TIMES DAILY WITH MEALS
Status: DISCONTINUED | OUTPATIENT
Start: 2019-11-20 | End: 2019-11-21 | Stop reason: HOSPADM

## 2019-11-20 RX ORDER — SODIUM CHLORIDE 9 MG/ML
INJECTION, SOLUTION INTRAVENOUS CONTINUOUS
Status: ACTIVE | OUTPATIENT
Start: 2019-11-20 | End: 2019-11-21

## 2019-11-20 RX ORDER — FAMOTIDINE 20 MG/1
20 TABLET, FILM COATED ORAL DAILY
Status: DISCONTINUED | OUTPATIENT
Start: 2019-11-21 | End: 2019-11-21 | Stop reason: HOSPADM

## 2019-11-20 RX ORDER — FEXOFENADINE HCL AND PSEUDOEPHEDRINE HCI 60; 120 MG/1; MG/1
1 TABLET, EXTENDED RELEASE ORAL 2 TIMES DAILY
Status: DISCONTINUED | OUTPATIENT
Start: 2019-11-20 | End: 2019-11-21 | Stop reason: HOSPADM

## 2019-11-20 RX ADMIN — VENLAFAXINE HYDROCHLORIDE 150 MG: 150 CAPSULE, EXTENDED RELEASE ORAL at 09:32

## 2019-11-20 RX ADMIN — FAMOTIDINE 20 MG: 20 TABLET, FILM COATED ORAL at 09:31

## 2019-11-20 RX ADMIN — AMLODIPINE BESYLATE 5 MG: 5 TABLET ORAL at 09:31

## 2019-11-20 RX ADMIN — ATORVASTATIN CALCIUM 20 MG: 20 TABLET, FILM COATED ORAL at 20:19

## 2019-11-20 RX ADMIN — HUMAN ALBUMIN MICROSPHERES AND PERFLUTREN 3 ML: 10; .22 INJECTION, SOLUTION INTRAVENOUS at 14:11

## 2019-11-20 RX ADMIN — ASPIRIN 81 MG: 81 TABLET, COATED ORAL at 09:32

## 2019-11-20 RX ADMIN — SODIUM CHLORIDE, PRESERVATIVE FREE: 5 INJECTION INTRAVENOUS at 20:01

## 2019-11-20 RX ADMIN — SODIUM CHLORIDE 500 ML: 9 INJECTION, SOLUTION INTRAVENOUS at 16:50

## 2019-11-20 RX ADMIN — METFORMIN HYDROCHLORIDE 1000 MG: 500 TABLET, EXTENDED RELEASE ORAL at 20:03

## 2019-11-20 RX ADMIN — FEXOFENADINE HYDROCHLORIDE AND PSEUDOEPHEDRINE HYDROCHLORIDE 1 TABLET: 60; 120 TABLET, FILM COATED, EXTENDED RELEASE ORAL at 20:03

## 2019-11-20 RX ADMIN — Medication 1 MG: at 20:19

## 2019-11-20 RX ADMIN — LOSARTAN POTASSIUM 50 MG: 50 TABLET, FILM COATED ORAL at 09:31

## 2019-11-20 NOTE — CONSULTS
Consult Date:  11/20/2019      REFERRING PROVIDER:  Wendy Montanez PA-C.      PRIMARY CARE DOCTOR:  Madison Langley MD.      HISTORY OF PRESENT ILLNESS:  Princess Edgar, a 74-year-old woman with coronary artery disease, diabetes mellitus, hypertension and obstructive sleep apnea, was evaluated in consultation at the request of Ms. Montanez for an episode of chest discomfort and lightheadedness after taking nitroglycerin.      Yesterday afternoon, just after taking a shower, the patient noted the onset of a grabbing right-sided chest discomfort without radiation, dyspnea, or diaphoresis.  The patient then decided to take a sublingual nitroglycerin ( she takes   NTG very infrequently).Just after she took the NTG,, one of her neighbors knocked on the door and as the patient walked towards the door,she developed profound lightheadedness.  then sat down and thinks she may have briefly passed out , although her neighbor found her to be awake without focal neurologic deficit.  The patient was then brought  to the emergency room. Emergency room evaluation showed a normal chest x-ray, normal troponin levels, and no change in her electrocardiogram.  The patient has been free of recurrent symptoms and subsequent troponin levels have all  been normal.  Cardiology consultation was requested.      The patient does not describe typical chest, arm, neck, jaw, or back discomfort with exertion.      The patient's past cardiac history is  significant for single vessel  previous bypass surgery, at which time a LIMA graft was placed to the LAD.  She has also had a stent placed in the proximal LAD.  Her most recent coronary and graft  angiogram in 06/2019 showed the LIMA graft was occluded; however, there were no flow-limiting lesions in the right, circumflex, or LAD.  IFR was performed in the proximal LAD which was normal.      PAST MEDICAL HISTORY:   1.  Coronary artery disease.   a. Old history of stent implantation in  LAD.   b.  History of LIMA graft to the LAD.   c.  Recent coronary angiogram in 06/2019 showing no significant narrowing in the left main, dominant right coronary, or nondominant circumflex.  Moderate in-stent restenosis in the proximal LAD stent with normal IFR.  LIMA graft to LAD occluded presumptively because of normal flow down the LAD.   2.  Diabetes mellitus.   3.  Chronic kidney disease -- GFR equal to 48.   4.  Obstructive sleep apnea.   5.  Osteoarthritis.   6.  Dyslipidemia.      ALLERGIES:  AMOXICILLIN AND LISINOPRIL.      HABITS:  The patient does not abuse tobacco or alcohol.      SOCIAL HISTORY:  The patient has been a  on 2 occasions.  She lives very close to her mother.  The patient has 2 children and 5 grandchildren.  She worked as an artist in the past.      REVIEW OF SYSTEMS:  A 12-point review of systems was performed.  Outside the issues mentioned in the HPI, there are no other complaints.      FAMILY HISTORY:  There is a family history of coronary disease in her father and cancer in her father.  Her mother had arthritis.      MEDICATIONS:   1.  Venlafaxine.   2.  Effexor 150 mg daily.   3.  Trazodone 100 mg at bedtime.   4.  Metoprolol tartrate 12.5 b.i.d.   5.  Losartan 50 mg daily.   6.  Famotidine 20 mg twice a day.   7.  Amlodipine 5 mg daily.   8.  Aspirin 81 daily.   9.  Atorvastatin 20 mg daily.      PHYSICAL EXAMINATION:   GENERAL:  Demonstrates a very pleasant and cooperative 74-year-old woman who appears comfortable at rest at this time.   VITAL SIGNS:  Her blood pressure was 150/72, her heart rate was 57.   LUNGS:  Clear to percussion and auscultation.   CARDIOVASCULAR:  Shows a normal S1 with a normal S2.  There is no S3.  There is no murmur, rub or click.  Her pulses are symmetrical in the carotid, radial, brachial, femoral, popliteal, dorsalis pedis, posterior tibials.   ABDOMEN:  Bowel sounds are present in all 4 quadrants.  Liver percusses to 7 cm, was not palpable.  Aorta  is not tender or enlarged.   EXTREMITIES:  There is no swelling, cyanosis or clubbing.   NEUROLOGIC:  Cranial nerves II-XII are intact.  Strength is equal and symmetrical.  She displays normal insight and judgment.      LABORATORY DATA:  Her potassium is 4.1 with a creatinine of 1.13 and GFR of 48.  Her troponins are 0.015 or less on 2 occasions.  Her hemoglobin is 12.5.  Her platelet count is 256,000, white blood cell count is 8.0.  Her ECG shows a normal sinus rhythm with nonspecific ST-T wave changes.  Her chest x-ray shows normal heart with clear lung fields.  There are post-sternotomy changes.      ASSESSMENT:  This 74-year-old woman with a known history of coronary disease presents with an episode of atypical chest discomfort.  The patient developed profound lightheadedness after taking a sublingual nitroglycerin.  I am not suspicious of acute coronary syndrome.  A stress echo would be reasonable to make certain there is no evidence of significant ischemia.  Unless significant ischemia were seen, I would not advise any further diagnostic cardiac testing and would recommend continued optimal lifestyle intervention and medical therapy.      RECOMMENDATIONS:   1.  Stress echocardiogram.   2.  Continue atorvastatin and aspirin.   3.  Optimize blood pressure control.  May wish to consider increasing losartan from 50 mg to 100 mg a day, adding a low-dose diuretic if necessary with a target systolic pressure of 130mmhg or less.      We have appreciated the opportunity to care for your patient, Princess Edgar.         GARCÍA CARRILLO MD             D: 2019   T: 2019   MT: NAVID      Name:     PRINCESS EDGAR   MRN:      [id number]        Account:       [id number]   :      1945           Consult Date:  2019      Document: [id number]

## 2019-11-20 NOTE — PLAN OF CARE
PRIMARY DIAGNOSIS: CHEST PAIN  OUTPATIENT/OBSERVATION GOALS TO BE MET BEFORE DISCHARGE:  1. Ruled out acute coronary syndrome (negative or stable Troponin):  Yes  2. Pain Status: Pain free.  3. Appropriate provocative testing performed: Yes  - Stress Test Procedure: Echo  - Interpretation of cardiac rhythm per telemetry tech: SR, HR 80    4. Cleared by Consultants (if applicable):N/A  5. Return to near baseline physical activity: Yes  6. Orthostatics performed:  Yes - positive orthostatics, bolus ordered, pt will stay overnight.  Discharge Planner Nurse   Safe discharge environment identified: Yes  Barriers to discharge: Not once medically clear     Please review provider order for any additional goals.   Nurse to notify provider when observation goals have been met and patient is ready for discharge.

## 2019-11-20 NOTE — PLAN OF CARE
PRIMARY DIAGNOSIS: CHEST PAIN  OUTPATIENT/OBSERVATION GOALS TO BE MET BEFORE DISCHARGE:  1. Ruled out acute coronary syndrome (negative or stable Troponin):  Yes  2. Pain Status: Pain free.  3. Appropriate provocative testing performed: N/A  - Stress Test Procedure: N/A  - Interpretation of cardiac rhythm per telemetry tech: SR 60s    4. Cleared by Consultants (if applicable):N/A  5. Return to near baseline physical activity: Yes  Discharge Planner Nurse   Safe discharge environment identified: Yes  Barriers to discharge: Yes- Pt was cardiology consult in AM.    No acute events overnight. Q4H sugars- pt was 92 at 0400. SL.            Entered by: Ferdinand Alvarez 11/20/2019 5:12 AM     Please review provider order for any additional goals.   Nurse to notify provider when observation goals have been met and patient is ready for discharge.

## 2019-11-20 NOTE — PLAN OF CARE
PRIMARY DIAGNOSIS: CHEST PAIN  OUTPATIENT/OBSERVATION GOALS TO BE MET BEFORE DISCHARGE:  1. Ruled out acute coronary syndrome (negative or stable Troponin):  Yes  2. Pain Status: Pain free.  3. Appropriate provocative testing performed: Yes, at stress test.  - Interpretation of cardiac rhythm per telemetry tech: SR in 60's     4. Cleared by Consultants (if applicable):No  5. Return to near baseline physical activity: Yes  Discharge Planner Nurse   Safe discharge environment identified: Yes  Barriers to discharge: Yes       Entered by: Latricia Daly 11/20/2019 3:09 PM  Please review provider order for any additional goals.   Nurse to notify provider when observation goals have been met and patient is ready for discharge.    Prior to departure for stress test, Pt. A and Ox4. VSS. Metoprolol held.contact precautions maintained. PIV SL. NPO. SBA. Per tele tech  SR in 60's.

## 2019-11-20 NOTE — CONSULTS
Cardiology Consultation  Dictated    Assessment : Atypical chest pain. Troponins negative ECG unchanged.  Recent CAG 6/2019 showing no significant narrowing. Became lightheaded after taking NTG yesterday.     Recommendation  1) stress echocardiogram : unless significant ischemia seen, I would not advise further evaluation      [place]

## 2019-11-20 NOTE — PLAN OF CARE
PRIMARY DIAGNOSIS: CHEST PAIN  OUTPATIENT/OBSERVATION GOALS TO BE MET BEFORE DISCHARGE:  1. Ruled out acute coronary syndrome (negative or stable Troponin):  Yes  2. Pain Status: Pain free.  3. Appropriate provocative testing performed: Yes, going to stress test shortly.  - Interpretation of cardiac rhythm per telemetry tech: SR in 60's      4. Cleared by Consultants (if applicable):No  5. Return to near baseline physical activity: Yes  Discharge Planner Nurse   Safe discharge environment identified: Yes  Barriers to discharge: Yes       Entered by: Latricia Daly 11/20/2019 12:03PM     Please review provider order for any additional goals.   Nurse to notify provider when observation goals have been met and patient is ready for discharge.

## 2019-11-20 NOTE — PROGRESS NOTES
"[facility]  Observation Unit  Progress Note    Date of Service: 11/20/2019    Patient: Princess Edgar  MRN: [id number]  Admission Date: 11/19/2019  Hospital Day # 2    Assessment & Plan: Princess Edgar is a 74 year old female with a history of IBS, Insomnia, BPPV, CKD, GASPER, HTN, Anxiety, HLD, GERD, Depression, DM Type 2 who presents to the ED today 2/2 chest pain and syncope.           .        Acute Chest Pain - Patient with an acute onset of right sided squeezing chest pain around 1630 on 11/19 without radiation, shortness of breath, palpitations, wheezing, URI symptoms, reproducible pain or pleuritic complaints which resolved after sl nitroglycerin. Serial troponin levels overnight were negative.  Stress echocardiogram was negative.  Patient denies any further chest pain.  Atypical pain.         Syncope, Orthostatic Hypotension - few seconds of LOC while sitting in a chair shortly after taking sl nitroglycerin and ambulating the day of admission 11/19.  Patient also reports \"years\" of dizziness and generalized weakness likely due to underlying orthostatic hypotension. Patient with positive orthostatic vital signs today and symptomatic with a 50 point drop in systolic bp from lying to standing.   No reports of fluid losses.  Patient is on serotonin specific reuptake inhibitor, Trazodone and a beta blocker which may be contributing to her orthostasis  - will give an IVF bolus now and recheck orthostatics  - hold pta Trazodone and beta blocker which may be contributing  - echocardiogram pending  - consider compression stockings or an abdominal binder  - given patient lives alone and with a 50 point drop in systolic bp from lying to standing this afternoon and symptomatic, will plan to keep overnight for the above plan      HTN - continue pta Losartan and Amlodipine.  Lopressor held earlier today for stress echo.  Given symptomatic orthostatic hypotension will hold evening dose of " "Lopressor.     Hyperlipidemia - last lipid panel (9/2019) Tchol 138, HDL 50, LDL 70, Trig 90.  Continue pta statin     Hx CAD - CAD requiring stenting and CABG (LIMA to LAD in 1999).  Most recent coronary angiogram 6/2019 that demonstrated no flow-limiting lesions, moderate in-stent restenosis in the ostial proximal LAD stent.  LIMA graft is occluded at its midportion likely due to the flow that is good to the native LAD.  No significant disease in the RCA or circumflex.  - continue pta ASA    GERD - pt denies reflux symptoms.  Continue pta Ranitidine     Generalized Anxiety Disorder, Insomnia - continue pta Venlafaxine      DM Type 2 - A1C 6.0 12/2018.  BS on admission 215.  Continue pta Metformin and continue ISS protocol.     CKD - creatinine on admission 1.13 at recent baseline.    CODE: full  DVT: ambulation  Diet/fluids: diabetic, NS  Disposition: likely discharge 11/21    Wendy Lisseth EMMANUEL PA-C  Hospitalist Physician Assistant  [facility]  Pager: [phone]      Subjective & Interval Hx:    Patient denies chest pain, shortness of breath, abdominal pain.  She reports dizziness when orthostatic vital signs were checked.     Last 24 hr care team notes reviewed.   ROS:  4 point ROS including Respiratory, CV, GI and , other than that noted in the HPI, is negative.    Physical Exam:    Blood pressure (!) 180/73, pulse 57, temperature 98.3  F (36.8  C), temperature source Oral, resp. rate 20, height 1.626 m (5' 4\"), weight 81.6 kg (180 lb), SpO2 94 %, not currently breastfeeding.  General: Alert, interactive, NAD, lying in bed  HEENT: AT/NC, sclera anicteric, PERRL, EOMI  Resp: clear to auscultation bilaterally, no crackles or wheezes  Cardiac: regular rate and rhythm, no murmur  Abdomen: Soft, nontender, nondistended. +BS.  No HSM or masses, no rebound or guarding.  Extremities: No LE edema  Skin: Warm and dry, no jaundice or rash  Neuro: Alert & oriented x 3, Cns 2-12 intact, moves all " extremities equally    Labs & Images:  Reviewed in Epic   Medications:    Current Facility-Administered Medications   Medication     0.9% sodium chloride BOLUS     acetaminophen (TYLENOL) tablet 650 mg     alum & mag hydroxide-simethicone (MYLANTA ES/MAALOX  ES) suspension 20 mL     amLODIPine (NORVASC) tablet 5 mg     aspirin EC tablet 81 mg     atorvastatin (LIPITOR) tablet 20 mg     glucose gel 15-30 g    Or     dextrose 50 % injection 25-50 mL    Or     glucagon injection 1 mg     [START ON 11/21/2019] famotidine (PEPCID) tablet 20 mg     insulin aspart (NovoLOG) inj (RAPID ACTING)     lidocaine (LMX4) cream     lidocaine 1 % 0.1-1 mL     losartan (COZAAR) tablet 50 mg     metoprolol tartrate (LOPRESSOR) half-tab 12.5 mg     morphine (PF) injection 1 mg     naloxone (NARCAN) injection 0.1-0.4 mg     nitroGLYcerin (NITROSTAT) sublingual tablet 0.4 mg     sodium chloride (PF) 0.9% PF flush 3 mL     sodium chloride (PF) 0.9% PF flush 3 mL     venlafaxine (EFFEXOR-XR) 24 hr capsule 150 mg

## 2019-11-20 NOTE — PLAN OF CARE
ROOM # 228  Living Situation (if not independent, order SW consult):ind living apartment  Facility name  : [facility]     Activity level at baseline: ind  Activity level on admit: SBA      Patient registered to observation; given Patient Bill of Rights; given the opportunity to ask questions about observation status and their plan of care.  Patient has been oriented to the observation room, bathroom and call light is in place.    Discussed discharge goals and expectations with patient/family.

## 2019-11-20 NOTE — ED NOTES
"[facility]  ED Nurse Handoff Report    Princess Edgar is a 74 year old female   ED Chief complaint: Chest Pain  . ED Diagnosis:   Final diagnoses:   Chest pain, unspecified type   Syncope, unspecified syncope type     Allergies:   Allergies   Allergen Reactions     Amoxicillin-Pot Clavulanate GI Disturbance     Lisinopril Cough       Code Status: Full Code  Activity level - Baseline/Home:  Independent. Activity Level - Current:   Stand by Assist. Lift room needed: No. Bariatric: No   Needed: No   Isolation: Yes. Infection: Not Applicable  MRSA.     Vital Signs:   Vitals:    11/19/19 1715 11/19/19 1722 11/19/19 1730 11/19/19 1745   BP: 123/68 124/68 134/68 134/65   Pulse: 56  53 52   Resp: 16 16 14 16   Temp:  98  F (36.7  C)     TempSrc:  Oral     SpO2: 95% 96% 95% 90%   Weight:  79.4 kg (175 lb)     Height:  1.626 m (5' 4\")         Cardiac Rhythm:  ,      Pain level:    Patient confused: No. Patient Falls Risk: Yes.   Elimination Status: Is able to ambulate to restroom with assist   Patient Report - Initial Complaint: Chest pain and syncope  Focused Assessment:   Princess Edgar is a 74 year old female who presents with right-sided chest pain in the setting of recent catheterization on 6/9/19, results below. Tonight, patient started to get a pressure-type chest pain while she was sitting on her bed making a phone call about one hour ago. She then got up and took a nitroglycerin at 1630. This resolved her pain to her memory. She then got up again when a friend came to the door and she had dizziness followed by a syncopal episode and was unconscious for a few seconds. Patient felt normal when she got up. This pain is different than before her previous catheterization and different than her indigestion. Patient denies shortness of breath and nausea.    Tests Performed:   Chest XR,  PA & LAT   Final Result   IMPRESSION: Prominence of the interstitial markings. No consolidation. Heart size " normal postop median sternotomy. ASCVD aorta.        Abnormal Results:   Labs Ordered and Resulted from Time of ED Arrival Up to the Time of Departure from the ED   CBC WITH PLATELETS - Abnormal; Notable for the following components:       Result Value    MCHC 31.4 (*)     All other components within normal limits   BASIC METABOLIC PANEL - Abnormal; Notable for the following components:    Glucose 215 (*)     Creatinine 1.13 (*)     GFR Estimate 48 (*)     GFR Estimate If Black 56 (*)     Calcium 8.3 (*)     All other components within normal limits   TROPONIN I   CARDIAC CONTINUOUS MONITORING   PERIPHERAL IV CATHETER      Treatments provided: 20ga PIV, warm blankets, frequent rounding and updates to pt on plan of care.  Family Comments: Friends at bedside.  OBS brochure/video discussed/provided to patient:  Yes  ED Medications: Medications - No data to display  Drips infusing:  No  For the majority of the shift, the patient's behavior Green.      Severe Sepsis OR Septic Shock Diagnosis Present: No      ED Nurse Name/Phone Number: Ariadne Bennett RN,   7:40 PM    RECEIVING UNIT ED HANDOFF REVIEW    Above ED Nurse Handoff Report was reviewed: Yes  Reviewed by: Ferdinand Alvarez RN on November 19, 2019 at 7:50 PM

## 2019-11-20 NOTE — H&P
[facility]  Observation Unit  H & P      Patient Name: Princess Edgar MRN# [id number]   Age: 74 year old YOB: 1945     Date of Admission:11/19/2019    Primary care provider: Madison Langley  Date of Service: 11/19/2019         Assessment and Plan:   Princess Edgar is a 74 year old female with a history of IBS, Insomnia, BPPV, CKD, GASPER, HTN, Anxiety, HLD, GERD, Depression, DM Type 2 who presents to the ED today 2/2 chest pain and syncope. .      Acute Chest Pain - concerning for angina.  Currently chest pain free.  Patient with an acute onset of right sided squeezing chest pain around 1630 without radiation, shortness of breath, palpitations, wheezing, URI symptoms, reproducible pain or pleuritic complaints which resolved after sl nitroglycerin.  Initial troponin negative.  EKG revealed sinus bradycardia with t wave inversion V1 and V2.     Patient with a hx of CAD requiring stenting and CABG (LIMA to LAD in 1999).  Most recent coronary angiogram 6/2019 that demonstrated no flow-limiting lesions, moderate in-stent restenosis in the ostial proximal LAD stent.  LIMA graft is occluded at its midportion likely due to the flow that is good to the native LAD.  No significant disease in the RCA or circumflex.  - continue pta ASA  - serial troponin levels.  If they become positive, recommend starting a heparin infusion  - check BNP  - telemetry monitoring  - currently chest pain free, will start sl nitroglycerin prn  - Cardiology consult in am to recommend further stress testing vs coronary angiogram.    Syncope - few second of LOC while sitting in a chair shortly after taking sl nitroglycerin and ambulating.  Most likely 2/2 orthostatic hypotension.  No shortness of breath, tachycardia, hypoxia or pleuritic pain to suggest PE.  No arrhythmia noted on EKG.  No seizure activity, hypoglycemia or evidence of infection.  - chest pain work up as above  - check orthostatic vital signs  - monitor on  "telemetry  - echocardiogram    HTN - continue pta Lopressor, Losartan and Amlodipine    Hyperlipidemia - last lipid panel (9/2019) Tchol 138, HDL 50, LDL 70, Trig 90.  Continue pta statin    GERD - pt denies reflux symptoms today.  Continue pta Ranitidine    Generalized Anxiety Disorder, Insomnia - continue pta Venlafaxine and Trazodone    DM Type 2 - A1C 6.0 12/2018.  BS on admission 215.  - hold pta Metformin for now  - start ISS protocol    CKD - creatinine on admission 1.13 at recent baseline.    Awaiting pharmacy med rec at this time to confirm above home medications.      CODE: Full  Diet/IVF: cardiac diet, npo after midnight in case any procedures in am  GI ppx:  ranitidine  DVT ppx: scd    Wendy Montanez MS PA-C  Physician Assistant   Hospitalist Service  Pager: [phone]           Chief Complaint:   Chest Pain         HPI:   74 year old female with a history of IBS, Insomnia, BPPV, CKD, GASPER, HTN, Anxiety, HLD, GERD, Depression, DM Type 2 who presents to the ED today 2/2 chest pain and syncope.    Patient reports she has felt shaky, generalized weakness and intermittent dizziness for years.  Today, she had her usual symptoms when she awoke and was at her baseline.  She at breakfast, took her medications.  She took a shower this afternoon and performed her ADLs.  After her shower, she sat on her bed and developed a \"squeezing\" right sided chest pressure without radiation, shortness of breath, palpitations, lightheadedness or diaphoresis around 1630.  The pain lasted several minutes while she walked to her kitchen to find her nitroglycerin.  She took her nitroglycerin and the chest pain resolved.  Her neighbor then knocked on her door.  Patient got up to walk to the door and felt lightheaded like she was going to pass out.  She denies any chest pain, shortness of breath, focal weakness during that time.  She walked to her chair and sat down and reports she thinks she lost consciousness for a few seconds.  " She did not fall or injure herself.  Her neighbor was present.  No slurred speech, focal weakness or seizure activity was reported.  EMS was called.  She was given ASA 324mg and brought to the ED.  Currently, she denies chest pain, shortness of breath, URI symptoms, wheezing, pleuritic pain, abdominal pain, dizziness, reflux.  She denies any orthopnea, PND, LE edema, weight gain, recent MSK injury.  She reports stress taking care of her elderly mother.    ED work up revealed patient hemodynamically stable and afebrile on room air.  Laboratory work up revealed creatinine 1.13 with otherwise normal BMP, Troponin, CBC.  EKG revealed sinus bradycardia with t wave inversion V1 and V2.  CXR revealed prominence of the interstitial markings. No consolidation. Heart size normal postop median sternotomy. ASCVD aorta.  Patient received ASA via EMS and admitted for further management.         Past Medical History:     Past Medical History:   Diagnosis Date     Anxiety      Arthritis      BPPV (benign paroxysmal positional vertigo)      CKD (chronic kidney disease)      Diabetes mellitus type 2 in nonobese (H)      GERD (gastroesophageal reflux disease)      Glaucoma (increased eye pressure)      HLD (hyperlipidemia)      Hypertension      IBS (irritable bowel syndrome)      Insomnia      GASPER (obstructive sleep apnea)           Past Surgical History:     Past Surgical History:   Procedure Laterality Date     CARDIAC SURGERY       CV ANGIOGRAM CORONARY GRAFT N/A 6/11/2019    Procedure: Angiogram Coronary Graft;  Surgeon: Hayden Muñiz MD;  Location:  HEART CARDIAC CATH LAB     CV INSTANTANEOUS WAVE-FREE RATIO N/A 6/11/2019    Procedure: Instantaneous Wave-Free Ratio;  Surgeon: Hayden Muñiz MD;  Location:  HEART CARDIAC CATH LAB     CV LEFT HEART CATH N/A 6/11/2019    Procedure: Left Heart Cath;  Surgeon: Hayden Muñiz MD;  Location:  HEART CARDIAC CATH LAB     EYE SURGERY       ORTHOPEDIC SURGERY             Social History:     Social History     Socioeconomic History     Marital status: Single     Spouse name: Not on file     Number of children: 1     Years of education: Not on file     Highest education level: Not on file   Occupational History     Occupation: retired   Social Needs     Financial resource strain: Not on file     Food insecurity:     Worry: Not on file     Inability: Not on file     Transportation needs:     Medical: Not on file     Non-medical: Not on file   Tobacco Use     Smoking status: Former Smoker     Start date:      Last attempt to quit:      Years since quittin.9     Smokeless tobacco: Never Used   Substance and Sexual Activity     Alcohol use: Yes     Drug use: No     Sexual activity: Not on file   Lifestyle     Physical activity:     Days per week: Not on file     Minutes per session: Not on file     Stress: Not on file   Relationships     Social connections:     Talks on phone: Not on file     Gets together: Not on file     Attends Yarsani service: Not on file     Active member of club or organization: Not on file     Attends meetings of clubs or organizations: Not on file     Relationship status: Not on file     Intimate partner violence:     Fear of current or ex partner: Not on file     Emotionally abused: Not on file     Physically abused: Not on file     Forced sexual activity: Not on file   Other Topics Concern     Not on file   Social History Narrative     Not on file          Family History:     Family History   Problem Relation Age of Onset     Arthritis Mother      Coronary Artery Disease Father      Cancer Father           Allergies:      Allergies   Allergen Reactions     Amoxicillin-Pot Clavulanate GI Disturbance     Lisinopril Cough          Medications:     Prior to Admission medications    Medication Sig Last Dose Taking? Auth Provider   albuterol (PROAIR HFA/PROVENTIL HFA/VENTOLIN HFA) 108 (90 Base) MCG/ACT inhaler Inhale 2 puffs into the lungs every 4 hours  as needed for shortness of breath / dyspnea or wheezing   Reported, Patient   amLODIPine (NORVASC) 5 MG tablet Take 2.5 mg by mouth daily   Reported, Patient   aspirin (ASA) 81 MG tablet Take 1 tablet (81 mg) by mouth daily   India Wilson APRN CNP   diclofenac (VOLTAREN) 1 % topical gel Place 2 g onto the skin 4 times daily   Reported, Patient   diphenoxylate-atropine (LOMOTIL) 2.5-0.025 MG tablet Take 1 tablet by mouth 4 times daily as needed for diarrhea   Reported, Patient   fluticasone (FLONASE) 50 MCG/ACT nasal spray Spray 2 sprays into both nostrils daily   Reported, Patient   loratadine (CLARITIN) 10 MG tablet Take 10 mg by mouth daily   Reported, Patient   LORAZEPAM PO Take 0.5 mg by mouth nightly as needed    Reported, Patient   losartan (COZAAR) 50 MG tablet Take 50 mg by mouth daily   Reported, Patient   metFORMIN (GLUCOPHAGE-XR) 500 MG 24 hr tablet Take 2,000 mg by mouth daily (with dinner)   Reported, Patient   metoprolol tartrate (LOPRESSOR) 25 MG tablet Take 12.5 mg by mouth 2 times daily   Reported, Patient   nitroGLYcerin (NITROSTAT) 0.4 MG sublingual tablet For chest pain place 1 tablet under the tongue every 5 minutes for 3 doses. If symptoms persist 5 minutes after 2nd dose call 911.   Catalina Benson PA-C   olopatadine (PATADAY) 0.2 % ophthalmic solution Place 1 drop into both eyes daily as needed   Reported, Patient   RANITIDINE HCL PO Take 150 mg by mouth 2 times daily    Reported, Patient   SIMVASTATIN PO Take 20 mg by mouth At Bedtime    Reported, Patient   traZODone (DESYREL) 100 MG tablet Take 100 mg by mouth   Reported, Patient   venlafaxine (EFFEXOR-ER) 225 MG TB24 24 hr tablet Take 150 mg by mouth daily (with breakfast)    Reported, Patient          Review of Systems:   A complete ROS was performed and is negative other than what is stated in the HPI.       Physical Exam:   Blood pressure 134/65, pulse 52, temperature 98  F (36.7  C), temperature source Oral, resp. rate 16, height  "1.626 m (5' 4\"), weight 79.4 kg (175 lb), SpO2 90 %, not currently breastfeeding.  General: Alert, interactive, NAD, lying in bed, pleasant and cooperative.  HEENT: AT/NC, sclera anicteric, PERRL, EOMI  Chest/Resp: clear to auscultation bilaterally, no crackles or wheezes  Heart/CV: regular rate and rhythm, no murmur, no reproducible chest wall pain.  Abdomen/GI: Soft, nontender, nondistended. +BS.  No rebound or guarding.  Extremities/MSK: No LE edema  Skin: Warm and dry  Neuro: Alert & oriented x 3, no focal deficits, moves all extremities equally         Labs:   ROUTINE ICU LABS (Last four results)  CMP  Recent Labs   Lab 11/19/19  1756      POTASSIUM 4.1   CHLORIDE 107   CO2 28   ANIONGAP 5   *   BUN 19   CR 1.13*   GFRESTIMATED 48*   GFRESTBLACK 56*   DORIE 8.3*     CBC  Recent Labs   Lab 11/19/19  1756   WBC 8.0   RBC 4.10   HGB 12.5   HCT 39.8   MCV 97   MCH 30.5   MCHC 31.4*   RDW 13.2             Imaging/Procedures:     Results for orders placed or performed during the hospital encounter of 11/19/19   Chest XR,  PA & LAT    Narrative    EXAM: XR CHEST 2 VW  LOCATION: [facility]  DATE/TIME: 11/19/2019 5:56 PM    INDICATION: Chest pain  COMPARISON: 06/09/2019      Impression    IMPRESSION: Prominence of the interstitial markings. No consolidation. Heart size normal postop median sternotomy. ASCVD aorta.           "

## 2019-11-20 NOTE — PHARMACY-ADMISSION MEDICATION HISTORY
Admission medication history interview status for this patient is complete. See [facility] admission navigator for allergy information, prior to admission medications and immunization status.     Medication history interview source(s):Patient  Medication history resources (including written lists, pill bottles, clinic record):EPIC    Changes made to PTA medication list:  Added: atorvastatin, allegraD  Deleted: simvastatin, Loratadine  Changed: amlodipine, diclofenac gel to prn; Metformin    Medication reconciliation/reorder completed by provider prior to medication history?  No     Do you take OTC medications (eg tylenol, ibuprofen, fish oil, eye/ear drops, etc)? Yes     For patients on insulin therapy: No    Prior to Admission medications    Medication Sig Last Dose Taking? Auth Provider   albuterol (PROAIR HFA/PROVENTIL HFA/VENTOLIN HFA) 108 (90 Base) MCG/ACT inhaler Inhale 2 puffs into the lungs every 4 hours as needed for shortness of breath / dyspnea or wheezing  Yes Reported, Patient   amLODIPine (NORVASC) 5 MG tablet Take 5 mg by mouth daily  11/19/2019 at am Yes Reported, Patient   aspirin (ASA) 81 MG tablet Take 1 tablet (81 mg) by mouth daily 11/19/2019 at Unknown time Yes India Wilson APRN CNP   atorvastatin (LIPITOR) 20 MG tablet Take 20 mg by mouth At Bedtime 11/18/2019 at Unknown time Yes Unknown, Entered By History   fexofenadine-pseudoePHEDrine (ALLEGRA-D)  MG 12 hr tablet Take 1 tablet by mouth 2 times daily Seasonal use 11/19/2019 at am Yes Unknown, Entered By History   fluticasone (FLONASE) 50 MCG/ACT nasal spray Spray 2 sprays into both nostrils daily as needed   Yes Reported, Patient   LORAZEPAM PO Take 0.5 mg by mouth nightly as needed for anxiety   Yes Reported, Patient   losartan (COZAAR) 50 MG tablet Take 50 mg by mouth daily 11/19/2019 at am Yes Reported, Patient   metFORMIN (GLUCOPHAGE-XR) 500 MG 24 hr tablet Take 1,000 mg by mouth 2 times daily (with meals)  11/19/2019 at am Yes  Reported, Patient   metoprolol tartrate (LOPRESSOR) 25 MG tablet Take 12.5 mg by mouth 2 times daily 11/19/2019 at am Yes Reported, Patient   nitroGLYcerin (NITROSTAT) 0.4 MG sublingual tablet For chest pain place 1 tablet under the tongue every 5 minutes for 3 doses. If symptoms persist 5 minutes after 2nd dose call 911.  Yes Catalina Benson PA-C   olopatadine (PATADAY) 0.2 % ophthalmic solution Place 1 drop into both eyes daily as needed  Yes Reported, Patient   RANITIDINE HCL PO Take 150 mg by mouth 2 times daily  11/18/2019 at am Yes Reported, Patient   traZODone (DESYREL) 100 MG tablet Take 100 mg by mouth At Bedtime  11/18/2019 at Unknown time Yes Reported, Patient   venlafaxine (EFFEXOR-XR) 150 MG 24 hr capsule Take 150 mg by mouth daily 11/19/2019 at am Yes Unknown, Entered By History   diclofenac (VOLTAREN) 1 % topical gel Place 2 g onto the skin 4 times daily as needed  not using at this time  Reported, Patient

## 2019-11-20 NOTE — PLAN OF CARE
PRIMARY DIAGNOSIS: CHEST PAIN  OUTPATIENT/OBSERVATION GOALS TO BE MET BEFORE DISCHARGE:  1. Ruled out acute coronary syndrome (negative or stable Troponin):  Yes  2. Pain Status: Pain free.  3. Appropriate provocative testing performed: N/A  - Stress Test Procedure: N/A  - Interpretation of cardiac rhythm per telemetry tech: SR    4. Cleared by Consultants (if applicable):N/A  5. Return to near baseline physical activity: Yes  Discharge Planner Nurse   Safe discharge environment identified: Yes  Barriers to discharge: Yes-  Cards consult in AM.       Entered by: Ferdinand Alvarez 11/20/2019 12:37 AM     Please review provider order for any additional goals.   Nurse to notify provider when observation goals have been met and patient is ready for discharge.

## 2019-11-20 NOTE — PLAN OF CARE
PRIMARY DIAGNOSIS: CHEST PAIN  OUTPATIENT/OBSERVATION GOALS TO BE MET BEFORE DISCHARGE:  1. Ruled out acute coronary syndrome (negative or stable Troponin):  Yes  2. Pain Status: Pain free.  3. Appropriate provocative testing performed: N/A  - Stress Test Procedure: N/A  - Interpretation of cardiac rhythm per telemetry tech: Not on pt yet    4. Cleared by Consultants (if applicable):No  5. Return to near baseline physical activity: Yes  Discharge Planner Nurse   Safe discharge environment identified: Yes  Barriers to discharge: Yes Cardiology consult in AM.    No acute events. Pt denied having any CP.         Entered by: Ferdinand Alvarez 11/19/2019 9:03 PM     Please review provider order for any additional goals.   Nurse to notify provider when observation goals have been met and patient is ready for discharge.

## 2019-11-21 VITALS
SYSTOLIC BLOOD PRESSURE: 171 MMHG | WEIGHT: 180 LBS | HEIGHT: 64 IN | HEART RATE: 57 BPM | BODY MASS INDEX: 30.73 KG/M2 | DIASTOLIC BLOOD PRESSURE: 74 MMHG | OXYGEN SATURATION: 93 % | RESPIRATION RATE: 16 BRPM | TEMPERATURE: 98 F

## 2019-11-21 LAB
ANION GAP SERPL CALCULATED.3IONS-SCNC: 4 MMOL/L (ref 3–14)
BUN SERPL-MCNC: 11 MG/DL (ref 7–30)
CALCIUM SERPL-MCNC: 8.8 MG/DL (ref 8.5–10.1)
CHLORIDE SERPL-SCNC: 110 MMOL/L (ref 94–109)
CO2 SERPL-SCNC: 29 MMOL/L (ref 20–32)
CREAT SERPL-MCNC: 0.81 MG/DL (ref 0.52–1.04)
GFR SERPL CREATININE-BSD FRML MDRD: 72 ML/MIN/{1.73_M2}
GLUCOSE BLDC GLUCOMTR-MCNC: 81 MG/DL (ref 70–99)
GLUCOSE BLDC GLUCOMTR-MCNC: 91 MG/DL (ref 70–99)
GLUCOSE SERPL-MCNC: 125 MG/DL (ref 70–99)
POTASSIUM SERPL-SCNC: 4.2 MMOL/L (ref 3.4–5.3)
SODIUM SERPL-SCNC: 143 MMOL/L (ref 133–144)

## 2019-11-21 PROCEDURE — 96361 HYDRATE IV INFUSION ADD-ON: CPT

## 2019-11-21 PROCEDURE — 80048 BASIC METABOLIC PNL TOTAL CA: CPT | Performed by: HOSPITALIST

## 2019-11-21 PROCEDURE — 25000132 ZZH RX MED GY IP 250 OP 250 PS 637: Performed by: HOSPITALIST

## 2019-11-21 PROCEDURE — 99217 ZZC OBSERVATION CARE DISCHARGE: CPT | Performed by: HOSPITALIST

## 2019-11-21 PROCEDURE — 36415 COLL VENOUS BLD VENIPUNCTURE: CPT | Performed by: HOSPITALIST

## 2019-11-21 PROCEDURE — G0378 HOSPITAL OBSERVATION PER HR: HCPCS

## 2019-11-21 PROCEDURE — 00000146 ZZHCL STATISTIC GLUCOSE BY METER IP

## 2019-11-21 PROCEDURE — 25000132 ZZH RX MED GY IP 250 OP 250 PS 637: Performed by: PHYSICIAN ASSISTANT

## 2019-11-21 PROCEDURE — 25800030 ZZH RX IP 258 OP 636: Performed by: PHYSICIAN ASSISTANT

## 2019-11-21 RX ORDER — AMLODIPINE BESYLATE 5 MG/1
2.5 TABLET ORAL DAILY
COMMUNITY
Start: 2019-11-21 | End: 2019-12-20 | Stop reason: SINTOL

## 2019-11-21 RX ADMIN — SODIUM CHLORIDE, PRESERVATIVE FREE: 5 INJECTION INTRAVENOUS at 02:39

## 2019-11-21 RX ADMIN — VENLAFAXINE HYDROCHLORIDE 150 MG: 150 CAPSULE, EXTENDED RELEASE ORAL at 08:30

## 2019-11-21 RX ADMIN — LOSARTAN POTASSIUM 50 MG: 50 TABLET, FILM COATED ORAL at 08:30

## 2019-11-21 RX ADMIN — FEXOFENADINE HYDROCHLORIDE AND PSEUDOEPHEDRINE HYDROCHLORIDE 1 TABLET: 60; 120 TABLET, FILM COATED, EXTENDED RELEASE ORAL at 08:30

## 2019-11-21 RX ADMIN — FAMOTIDINE 20 MG: 20 TABLET, FILM COATED ORAL at 08:30

## 2019-11-21 RX ADMIN — ACETAMINOPHEN 650 MG: 325 TABLET, FILM COATED ORAL at 02:38

## 2019-11-21 RX ADMIN — ASPIRIN 81 MG: 81 TABLET, COATED ORAL at 08:30

## 2019-11-21 RX ADMIN — AMLODIPINE BESYLATE 5 MG: 5 TABLET ORAL at 08:30

## 2019-11-21 NOTE — PLAN OF CARE
PRIMARY DIAGNOSIS: SYNCOPE  OUTPATIENT/OBSERVATION GOALS TO BE MET BEFORE DISCHARGE:  1. Orthostatic performed: Yes:          Lying Orthostatic BP: 148/64         Sitting Orthostatic BP: 142/66         Standing Orthostatic BP: 127/71     2. Diagnostic testing complete & at baseline neurologic testing: Yes    3. Cleared by consultants (if involved): N/A    4. Interpretation of cardiac rhythm per telemetry tech: SR with HR in 70s    5. Tolerating adequate PO diet and medications: Yes    6. Return to near baseline physical activity or neurologic status: Currently SBA w/walker    Discharge Planner Nurse   Safe discharge environment identified: Yes  Barriers to discharge: Yes       Entered by: Jamshid Miller 11/21/2019 1:49 AM    Vitals are Temp: 98.6  F (37  C) Temp src: Oral BP: (!) 162/74   Heart Rate: 71 Resp: 16 SpO2: 94 %.  Patient is stable and resting in bed. Up with SBA.  Patient is on Contact precuations for MRSA.  Tolerating a Diabetic diet.  Patient has Normal Saline 0.9% running at 50 mL per hour. Will continue to monitor and provide cares.       Please review provider order for any additional goals.   Nurse to notify provider when observation goals have been met and patient is ready for discharge.

## 2019-11-21 NOTE — PLAN OF CARE
"PRIMARY DIAGNOSIS: SYNCOPE  OUTPATIENT/OBSERVATION GOALS TO BE MET BEFORE DISCHARGE:  1. Orthostatic performed: Yes:   reports she often will feel dizzy and lightheaded when changing positions, however does not happen every time she changes positions. Pt lying in bed reporting mild dizziness.        Lying Orthostatic BP: 190/88(Retake - 190/87)         Sitting Orthostatic BP: 174/87         Standing Orthostatic BP: 125/83     2. Diagnostic testing complete & at baseline neurologic testing: Yes    3. Cleared by consultants (if involved): N/A    4. Interpretation of cardiac rhythm per telemetry tech: NSR HR 70s    5. Tolerating adequate PO diet and medications: Yes    6. Return to near baseline physical activity or neurologic status: Yes    Discharge Planner Nurse   Safe discharge environment identified: Yes  Barriers to discharge: No       Entered by: Shira Muller 11/21/2019 8:44 AM     Please review provider order for any additional goals.   Nurse to notify provider when observation goals have been met and patient is ready for discharge.    BP (!) 153/67 (BP Location: Right arm)   Pulse 57   Temp 98.2  F (36.8  C) (Oral)   Resp 16   Ht 1.626 m (5' 4\")   Wt 81.6 kg (180 lb)   SpO2 95%   BMI 30.90 kg/m      A&Ox4.  Sitting up in bed eating breakfast. BG 91, metformin held d/t GFR 48, MD notified.   IVF running NS 50mL/hr.   Pt requesting to shower today.  Abd binder ordered, will apply when received from [facility].  "

## 2019-11-21 NOTE — PROGRESS NOTES
"Pt ambulated in the hallway SBA with gait belt walker and abdominal binder.Denies dizziness, stated feeling \"shaky and weak\".Steady gait.  "

## 2019-11-21 NOTE — PLAN OF CARE
Patient's After Visit Summary was reviewed with patient.  Patient verbalized understanding of After Visit Summary, recommended follow up and was given an opportunity to ask questions.   Discharge medications sent home with patient/family: no new meds at discharge.  Discharged with neighbor.      OBSERVATION patient END time: [id number]

## 2019-11-21 NOTE — PROGRESS NOTES
"Pt called reporting she was feeling more dizzy when lying in bed. Explained she felt \"drained\" more mentally than physically, and overall unwell. BP checked 154/79. Denied pain or nausea. Tele remained unchanged, NSR HR 70s. Pt requesting to rest for 30 minutes and then ambulate with abd binder to see if symptoms improve.  "

## 2019-11-21 NOTE — DISCHARGE SUMMARY
"[facility]  Hospitalist Discharge Summary       Date of Admission:  11/19/2019  Date of Discharge:  11/21/2019  Discharging Provider: Nikolas Stroud MD      Discharge Diagnoses   Chest pain. Orthostatic hypotension    Follow-ups Needed After Discharge   Follow-up Appointments     Follow-up and recommended labs and tests       Follow up with primary care provider, Madison Langley, on Dec 5th   (Thursday) at 10am. Please record your blood pressure daily and take this   log in with you to your appt.             Unresulted Labs Ordered in the Past 30 Days of this Admission     No orders found from 10/20/2019 to 11/20/2019.      These results will be followed up by NA    Discharge Disposition   Discharged to home  Condition at discharge: Stable    Hospital Course   74 year old female with a history of IBS, Insomnia, BPPV, CKD, GASPER, HTN, Anxiety, HLD, GERD, Depression, DM Type 2 who presents to the ED today 2/2 chest pain and syncope.     Patient reports she has felt shaky, generalized weakness and intermittent dizziness for years.  Today, she had her usual symptoms when she awoke and was at her baseline.  She at breakfast, took her medications.  She took a shower this afternoon and performed her ADLs.  After her shower, she sat on her bed and developed a \"squeezing\" right sided chest pressure without radiation, shortness of breath, palpitations, lightheadedness or diaphoresis around 1630.  The pain lasted several minutes while she walked to her kitchen to find her nitroglycerin.  She took her nitroglycerin and the chest pain resolved.  Her neighbor then knocked on her door.  Patient got up to walk to the door and felt lightheaded like she was going to pass out.  She denies any chest pain, shortness of breath, focal weakness during that time.  She walked to her chair and sat down and reports she thinks she lost consciousness for a few seconds.  She did not fall or injure herself.  Her neighbor was " "present.  No slurred speech, focal weakness or seizure activity was reported.  EMS was called.  She was given ASA 324mg and brought to the ED.  Currently, she denies chest pain, shortness of breath, URI symptoms, wheezing, pleuritic pain, abdominal pain, dizziness, reflux.  She denies any orthopnea, PND, LE edema, weight gain, recent MSK injury.  She reports stress taking care of her elderly mother.     ED work up revealed patient hemodynamically stable and afebrile on room air.  Laboratory work up revealed creatinine 1.13 with otherwise normal BMP, Troponin, CBC.  EKG revealed sinus bradycardia with t wave inversion V1 and V2.  CXR revealed prominence of the interstitial markings. No consolidation. Heart size normal postop median sternotomy. ASCVD aorta.  Patient received ASA via EMS and admitted for further management.     Patient's work-up was negative including a stress ECHO negative for ischemia. She had symptoms of lightheadedness. She was found to be orthostatic. She states she has had lightheadedness symptoms \"all her life\" but more in the past few years. I could see from her chart that her amlodipine was supposed to have been decreased to 2.5mg, but she was still taking 5mg. I personally spoke to her PCP (Dr. Langley). We will decrease her amlodipine back to 2.5mg. She has been given an abdominal binder which has helped her orthostasis and her symptoms. SHe has a cardiology appt in Jan. I made her an appt with Dr. Langley for Dec 5.    Consultations This Hospital Stay   CARDIOLOGY IP CONSULT    Code Status   Full Code    Time Spent on this Encounter   I, Nikolas Stroud MD, personally saw the patient today and spent greater than 30 minutes discharging this patient.       Nikolas Stroud MD  [facility]  ______________________________________________________________________    Physical Exam   Vital Signs: Temp: 98.2  F (36.8  C) Temp src: Oral BP: (!) 165/73   Heart Rate: 76 Resp: 16 SpO2: " 94 % O2 Device: None (Room air)    Weight: 180 lbs 0 oz  Constitutional: awake, alert, cooperative, no apparent distress, and appears stated age  Eyes: Lids and lashes normal, pupils equal, round and reactive to light, extra ocular muscles intact, sclera clear, conjunctiva normal  ENT: Normocephalic, without obvious abnormality, atraumatic, sinuses nontender on palpation, external ears without lesions, oral pharynx with moist mucous membranes, tonsils without erythema or exudates, gums normal and good dentition.  Respiratory: No increased work of breathing, good air exchange, clear to auscultation bilaterally, no crackles or wheezing  Cardiovascular: Normal apical impulse, regular rate and rhythm, normal S1 and S2, no S3 or S4, and no murmur noted  GI: No scars, normal bowel sounds, soft, non-distended, non-tender, no masses palpated, no hepatosplenomegally  Skin: no bruising or bleeding  Musculoskeletal: no lower extremity pitting edema present       Primary Care Physician   Madison Langley    Discharge Orders      Reason for your hospital stay    Chest pain. Your stress test was normal. Orthostatic hypotension causing lightheadedness. You have been given an abdominal binder and your medications have been adjusted.     Follow-up and recommended labs and tests     Follow up with primary care provider, Madison Langley, on Dec 5th (Thursday) at 10am. Please record your blood pressure daily and take this log in with you to your appt.     Activity    Your activity upon discharge: activity as tolerated     Diet    Follow this diet upon discharge: Moderate consistent carbohydrate (7585-2589 hermes / 4-6 CHO units per meal)       Significant Results and Procedures   Most Recent 3 CBC's:  Recent Labs   Lab Test 11/19/19  1756 06/10/19  0417 06/09/19 2002   WBC 8.0 8.3 7.8   HGB 12.5 12.9 13.9   MCV 97 97 98    277 304     Most Recent 3 BMP's:  Recent Labs   Lab Test 11/21/19  0901 11/19/19  1756 06/12/19  0734   06/10/19  0419    140  --   --  140   POTASSIUM 4.2 4.1 5.0   < > 4.3   CHLORIDE 110* 107  --   --  108   CO2 29 28  --   --  28   BUN   --   --  14   CR 0.81 1.13*  --   --  0.88   ANIONGAP 4 5  --   --  4   DORIE 8.8 8.3*  --   --  8.3*   * 215*  --   --  141*    < > = values in this interval not displayed.     Most Recent 2 LFT's:No lab results found.,   Results for orders placed or performed during the hospital encounter of 19   Chest XR,  PA & LAT    Narrative    EXAM: XR CHEST 2 VW  LOCATION: [facility]  DATE/TIME: 2019 5:56 PM    INDICATION: Chest pain  COMPARISON: 2019      Impression    IMPRESSION: Prominence of the interstitial markings. No consolidation. Heart size normal postop median sternotomy. ASCVD aorta.   Echocardiogram Complete    Narrative    [id number]  [id number]  [id number]  [id number]^Lisseth^Wendy^S           [facility]  Echocardiography Laboratory  [address] [place] [address]        Name: HANS LLOYD  MRN: [id number]  : 1945  Study Date: 2019 10:55 AM  Age: 74 yrs  Gender: Female  Reason For Study: Chest Pain  Referring Physician: Wendy Montanez  Performed By: Isai Beckett RDCS     BSA: 1.9 m2  Height: 64 in  Weight: 180 lb  HR: 66  BP: 145/75 mmHg  _____________________________________________________________________________  __        Procedure  Complete Portable Echo Adult.  _____________________________________________________________________________  __        Interpretation Summary     The left ventricle is normal in size. Proximal septal thickening is noted.  Left ventricular systolic function is normal. The visual ejection fraction is  estimated at 60-65%. Grade I or early diastolic dysfunction. Diastolic Doppler  findings (E/E' ratio and/or other parameters) suggest left ventricular filling  pressures are increased. No regional wall motion abnormalities noted.  The right ventricle is normal  size. The right ventricular systolic function is  normal.  Trace to mild mitral and tricuspid regurgitation.  No pericardial effusion.  In comparison to the previous report dated 06/10/2019, the findings are  similar.  _____________________________________________________________________________  __        Left Ventricle  The left ventricle is normal in size. Proximal septal thickening is noted.  Left ventricular systolic function is normal. The visual ejection fraction is  estimated at 60-65%. Grade I or early diastolic dysfunction. Diastolic Doppler  findings (E/E' ratio and/or other parameters) suggest left ventricular filling  pressures are increased. No regional wall motion abnormalities noted.     Right Ventricle  The right ventricle is normal size. The right ventricular systolic function is  normal.     Atria  Normal left atrial size. Right atrial size is normal. There is no color  Doppler evidence of an atrial shunt.        Mitral Valve  There is trace mitral regurgitation.     Tricuspid Valve  There is mild (1+) tricuspid regurgitation. The right ventricular systolic  pressure is approximated at 34mmHg plus the right atrial pressure.     Aortic Valve  There is mild trileaflet aortic sclerosis. No aortic regurgitation is present.     Pulmonic Valve  There is no pulmonic valvular stenosis.     Vessels  The aortic root is normal size. Normal size ascending aorta. Descending aortic  velocity normal. Inferior vena cava not well visualized for estimation of  right atrial pressure.     Pericardium  There is no pericardial effusion.        Rhythm  Sinus rhythm was noted.  _____________________________________________________________________________  __     MMode/2D Measurements & Calculations  IVSd: 1.4 cm  LVIDd: 3.6 cm  LVIDs: 2.3 cm  LVPWd: 1.1 cm  FS: 34.6 %  LV mass(C)d: 149.4 grams  LV mass(C)dI: 79.9 grams/m2  Ao root diam: 2.8 cm  asc Aorta Diam: 2.8 cm  LA Volume Indexed (AL/bp): 19.4 ml/m2     RA Area:  9.2 cm2  RWT: 0.61  TAPSE: 1.7 cm        Doppler Measurements & Calculations  MV E max dandre: 0.92 cm/sec  MV A max dandre: 122.5 cm/sec  MV E/A: 0.01  MV dec time: 0.28 sec  TR max dandre: 248.0 cm/sec  E/E': 0.17  Lateral E/e': 0.12  Peak E' Dandre: 5.4 cm/sec              _____________________________________________________________________________  __           Report approved by: Seth Puente 2019 03:15 PM      Echo Stress Echocardiogram    Narrative    [id number]  [id number]  [id number]  [id number]^LORENA^GARCÍA^[facility]  Echocardiography Laboratory  [address] [place] [address]        Name: HANS LLOYD  MRN: [id number]  : 1945  Study Date: 2019 10:55 AM  Age: 74 yrs  Gender: Female  Patient Location: [facility]  Reason For Study: Chest Pain  History: Chest Pain  Ordering Physician: GARCÍA CARRILLO  Referring Physician: Madison Langley  Performed By: Isai Beckett RDCS     BSA: 1.9 m2  Height: 64 in  Weight: 180 lb  HR: 87  BP: 122/74 mmHg  _____________________________________________________________________________  __        Procedure  Stress Echo Complete. Contrast Optison.  _____________________________________________________________________________  __        Interpretation Summary     1. Exercise echocardiogram is negative for myocardial ischemia at a peak heart  rate of 130 BPM (88% of maximal predicted heart rate).  2. No regional wall motion abnormalities.  3. Below-average exercise capacity (5.0 METS).     _____________________________________________________________________________  __     Stress  The patient exercised 9:30.  A low workload was achieved.  RPP 54137.  The patient exhibited no chest pain during exercise.  There was a normal BP response to exercise.  Exercise was stopped due to fatigue.  A treadmill exercise test according to the Modified Sacha protocol was  performed.  This was a normal stress EKG with no evidence of stress-induced  ischemia.  Target Heart Rate was achieved.  A low workload was achieved.  The Duke treadmill score was low risk ( >5 Duke score).  This was a normal stress echocardiogram with no evidence of stress-induced  ischemia.  Normal resting wall motion and no stress-induced wall motion abnormality.  The visual ejection fraction is estimated at >70%.  Global LV systolic function augments with exercise.  Left ventricular cavity size decreases with exercise.     Baseline  Normal baseline electrocardiogram.  Normal left ventricular function and wall motion at rest.  The visual ejection fraction is estimated at 60-65%.     Stress Results         Protocol:  MODIFIED BOAZ        Maximum Predicted HR:   148 bpm         Target HR: 126 bpm               % Maximum Predicted HR: 88 %        Stage  DurationHeart Rate  BP                    Comment             (mm:ss)   (bpm)    Stage 0   3:00      100   154/70   Stage 1/2  3:00      107   160/64    Stage 1   3:00      130   162/64    Stage 2   0:30      130      /   RecoveryR  6:00      88    142/74Functional Aerobic Capacity: Below Average               Stress Duration:   9:30 mm:ss *        Recovery Time: 6:00 mm:ss         Maximum Stress HR: 130 bpm *           METS:          5     Left Ventricle  The left ventricle is normal in size. There is normal left ventricular wall  thickness. Left ventricular systolic function is normal. The visual ejection  fraction is estimated at 65-70%. Normal left ventricular wall motion.        Right Ventricle  The right ventricle is normal in size and function.     Atria  Normal left atrial size. Right atrial size is normal.     Mitral Valve  The mitral valve leaflets appear normal. There is no evidence of stenosis,  fluttering, or prolapse. There is trace mitral regurgitation.     Tricuspid Valve  Normal tricuspid valve. There is trace tricuspid regurgitation.     Aortic Valve  The aortic valve is trileaflet. There is trace aortic regurgitation.  No  hemodynamically significant valvular aortic stenosis.        Pulmonic Valve  The pulmonic valve is not well visualized.     Pericardium  There is no pericardial effusion.     Rhythm  Sinus rhythm was noted.  _____________________________________________________________________________  __     MMode/2D Measurements & Calculations  LA Volume (BP): 36.6 ml  LA Volume Index (BP): 19.6 ml/m2           Doppler Measurements & Calculations  MV E max yudy: 91.5 cm/sec           _____________________________________________________________________________  __           Report approved by: Dr Jaziel Deleon 11/20/2019 03:38 PM            Discharge Medications   Current Discharge Medication List      CONTINUE these medications which have CHANGED    Details   amLODIPine (NORVASC) 5 MG tablet Take 0.5 tablets (2.5 mg) by mouth daily         CONTINUE these medications which have NOT CHANGED    Details   albuterol (PROAIR HFA/PROVENTIL HFA/VENTOLIN HFA) 108 (90 Base) MCG/ACT inhaler Inhale 2 puffs into the lungs every 4 hours as needed for shortness of breath / dyspnea or wheezing      aspirin (ASA) 81 MG tablet Take 1 tablet (81 mg) by mouth daily    Associated Diagnoses: Coronary artery disease involving native coronary artery of native heart without angina pectoris      atorvastatin (LIPITOR) 20 MG tablet Take 20 mg by mouth At Bedtime      fexofenadine-pseudoePHEDrine (ALLEGRA-D)  MG 12 hr tablet Take 1 tablet by mouth 2 times daily Seasonal use      fluticasone (FLONASE) 50 MCG/ACT nasal spray Spray 2 sprays into both nostrils daily as needed       LORAZEPAM PO Take 0.5 mg by mouth nightly as needed for anxiety       losartan (COZAAR) 50 MG tablet Take 50 mg by mouth daily      metFORMIN (GLUCOPHAGE-XR) 500 MG 24 hr tablet Take 1,000 mg by mouth 2 times daily (with meals)       metoprolol tartrate (LOPRESSOR) 25 MG tablet Take 12.5 mg by mouth 2 times daily      nitroGLYcerin (NITROSTAT) 0.4 MG sublingual tablet For  chest pain place 1 tablet under the tongue every 5 minutes for 3 doses. If symptoms persist 5 minutes after 2nd dose call 911.  Qty: 30 tablet, Refills: 1    Associated Diagnoses: Chest pain      olopatadine (PATADAY) 0.2 % ophthalmic solution Place 1 drop into both eyes daily as needed      RANITIDINE HCL PO Take 150 mg by mouth 2 times daily       traZODone (DESYREL) 100 MG tablet Take 100 mg by mouth At Bedtime       venlafaxine (EFFEXOR-XR) 150 MG 24 hr capsule Take 150 mg by mouth daily      diclofenac (VOLTAREN) 1 % topical gel Place 2 g onto the skin 4 times daily as needed            Allergies   Allergies   Allergen Reactions     Amoxicillin-Pot Clavulanate GI Disturbance     Lisinopril Cough

## 2019-11-21 NOTE — PLAN OF CARE
PRIMARY DIAGNOSIS: SYNCOPE  OUTPATIENT/OBSERVATION GOALS TO BE MET BEFORE DISCHARGE:  1. Orthostatic performed: Yes:          Lying Orthostatic BP: 148/64         Sitting Orthostatic BP: 142/66         Standing Orthostatic BP: 127/71     2. Diagnostic testing complete & at baseline neurologic testing: Yes    3. Cleared by consultants (if involved): N/A    4. Interpretation of cardiac rhythm per telemetry tech: SR     5. Tolerating adequate PO diet and medications: Yes    6. Return to near baseline physical activity or neurologic status: Yes    Patient alert and oriented x4. Vitals are Temp: 97.6  F (36.4  C) Temp src: Oral BP: 138/75   Heart Rate: 73 Resp: 18 SpO2: 94 % RA. Denies chest pain and pressure. Denies dizziness and nausea. NS infusing at 50 ml/hr. Tolerating Mod CHO diet. Up with standby assistance. Plan to recheck orthos in the morning and continue with symptom management.     Discharge Planner Nurse   Safe discharge environment identified: Yes  Barriers to discharge: No       Entered by: Lu Stone 11/20/2019      Please review provider order for any additional goals.   Nurse to notify provider when observation goals have been met and patient is ready for discharge.

## 2019-11-21 NOTE — PLAN OF CARE
PRIMARY DIAGNOSIS: SYNCOPE  OUTPATIENT/OBSERVATION GOALS TO BE MET BEFORE DISCHARGE:  1. Orthostatic performed: Yes:          Lying Orthostatic BP: 148/64         Sitting Orthostatic BP: 142/66         Standing Orthostatic BP: 127/71     2. Diagnostic testing complete & at baseline neurologic testing:   Yes. Echo Stress negative for MI    3. Cleared by consultants (if involved): N/A    4. Interpretation of cardiac rhythm per telemetry tech: SR    5. Tolerating adequate PO diet and medications: Yes    6. Return to near baseline physical activity or neurologic status: Currently SBA w/walker    Discharge Planner Nurse   Safe discharge environment identified: Yes  Barriers to discharge: Yes       Entered by: Jamshid Miller 11/21/2019 4:02 AM    Vitals are Temp: 98.6  F (37  C) Temp src: Oral BP: (!) 162/74   Heart Rate: 71 Resp: 16 SpO2: 94 %.  Patient AxOx4. Up with SBA.  Patient is on Contact precuations for MRSA.  Tolerating a Diabetic diet.  Patient has Normal Saline 0.9% running at 50 mL per hour. Tylenol given for 6/10 intermittent headache with decrease in pain.  Denies chest pain/dizziness/Nausea. Stable and resting in bed. Will continue to monitor and provide cares.           Please review provider order for any additional goals.   Nurse to notify provider when observation goals have been met and patient is ready for discharge.

## 2019-11-22 ENCOUNTER — TELEPHONE (OUTPATIENT)
Dept: CARDIOLOGY | Facility: CLINIC | Age: 74
End: 2019-11-22

## 2019-12-16 ENCOUNTER — HEALTH MAINTENANCE LETTER (OUTPATIENT)
Age: 74
End: 2019-12-16

## 2019-12-16 PROBLEM — K58.0 IRRITABLE BOWEL SYNDROME WITH DIARRHEA: Status: ACTIVE | Noted: 2018-01-30

## 2019-12-16 PROBLEM — Z00.00 ROUTINE PHYSICAL EXAMINATION: Status: ACTIVE | Noted: 2018-09-19

## 2019-12-16 PROBLEM — M85.80 LOW BONE MASS: Status: ACTIVE | Noted: 2018-11-08

## 2019-12-20 ENCOUNTER — OFFICE VISIT (OUTPATIENT)
Dept: CARDIOLOGY | Facility: CLINIC | Age: 74
End: 2019-12-20
Payer: COMMERCIAL

## 2019-12-20 VITALS — HEIGHT: 65 IN | WEIGHT: 178.5 LBS | BODY MASS INDEX: 29.74 KG/M2

## 2019-12-20 DIAGNOSIS — I95.1 ORTHOSTATIC HYPOTENSION: ICD-10-CM

## 2019-12-20 DIAGNOSIS — I10 BENIGN ESSENTIAL HYPERTENSION: ICD-10-CM

## 2019-12-20 DIAGNOSIS — I25.10 CORONARY ARTERY DISEASE INVOLVING NATIVE CORONARY ARTERY OF NATIVE HEART WITHOUT ANGINA PECTORIS: Primary | ICD-10-CM

## 2019-12-20 DIAGNOSIS — E78.5 HYPERLIPIDEMIA LDL GOAL <70: ICD-10-CM

## 2019-12-20 DIAGNOSIS — R03.0 ELEVATED BLOOD PRESSURE READING WITHOUT DIAGNOSIS OF HYPERTENSION: ICD-10-CM

## 2019-12-20 PROCEDURE — 99214 OFFICE O/P EST MOD 30 MIN: CPT | Performed by: INTERNAL MEDICINE

## 2019-12-20 RX ORDER — FLUOCINONIDE TOPICAL SOLUTION USP, 0.05% 0.5 MG/ML
SOLUTION TOPICAL
COMMUNITY
Start: 2019-06-27

## 2019-12-20 RX ORDER — DIPHENOXYLATE HCL/ATROPINE 2.5-.025MG
1 TABLET ORAL
COMMUNITY
Start: 2019-12-05 | End: 2021-01-13

## 2019-12-20 RX ORDER — BISACODYL 5 MG/1
TABLET, DELAYED RELEASE ORAL
COMMUNITY
Start: 2019-11-11 | End: 2020-02-03

## 2019-12-20 RX ORDER — FAMOTIDINE 20 MG/1
20 TABLET, FILM COATED ORAL DAILY
COMMUNITY
Start: 2019-12-05

## 2019-12-20 ASSESSMENT — MIFFLIN-ST. JEOR: SCORE: 1320.55

## 2019-12-20 NOTE — PROGRESS NOTES
HPI and Plan:   See dictation    Orders Placed This Encounter   Procedures     24 Hour Blood Pressure Monitor - Adult       Orders Placed This Encounter   Medications     bisacodyl (DULCOLAX) 5 MG EC tablet     Sig: Take 4 tablets by mouth once at 5 PM the evening before your procedure.     diphenoxylate-atropine (LOMOTIL) 2.5-0.025 MG tablet     Sig: Take 1 tablet by mouth     famotidine (PEPCID) 20 MG tablet     Sig: Take 20 mg by mouth     fluocinonide (LIDEX) 0.05 % external solution     omeprazole (PRILOSEC) 20 MG DR capsule     Sig: Take 20 mg by mouth     polyethylene glycol (GOLYTELY/NULYTELY) 236 g suspension     Sig: Take as directed in patient instructions: Drink 2000 mL at 6 PM the evening before and 2000 mL 4 hours before your procedure.       Medications Discontinued During This Encounter   Medication Reason     RANITIDINE HCL PO Medication Reconciliation Clean Up     amLODIPine (NORVASC) 5 MG tablet Side effects         Encounter Diagnoses   Name Primary?     Coronary artery disease involving native coronary artery of native heart without angina pectoris Yes     Benign essential hypertension      Hyperlipidemia LDL goal <70      Orthostatic hypotension      Elevated blood pressure reading without diagnosis of hypertension        CURRENT MEDICATIONS:  Current Outpatient Medications   Medication Sig Dispense Refill     albuterol (PROAIR HFA/PROVENTIL HFA/VENTOLIN HFA) 108 (90 Base) MCG/ACT inhaler Inhale 2 puffs into the lungs every 4 hours as needed for shortness of breath / dyspnea or wheezing       aspirin (ASA) 81 MG tablet Take 1 tablet (81 mg) by mouth daily       atorvastatin (LIPITOR) 20 MG tablet Take 20 mg by mouth At Bedtime       bisacodyl (DULCOLAX) 5 MG EC tablet Take 4 tablets by mouth once at 5 PM the evening before your procedure.       diclofenac (VOLTAREN) 1 % topical gel Place 2 g onto the skin 4 times daily as needed        diphenoxylate-atropine (LOMOTIL) 2.5-0.025 MG tablet Take 1  tablet by mouth       famotidine (PEPCID) 20 MG tablet Take 20 mg by mouth       fexofenadine-pseudoePHEDrine (ALLEGRA-D)  MG 12 hr tablet Take 1 tablet by mouth 2 times daily Seasonal use       fluocinonide (LIDEX) 0.05 % external solution        fluticasone (FLONASE) 50 MCG/ACT nasal spray Spray 2 sprays into both nostrils daily as needed        LORAZEPAM PO Take 0.5 mg by mouth nightly as needed for anxiety        losartan (COZAAR) 50 MG tablet Take 50 mg by mouth daily       metFORMIN (GLUCOPHAGE-XR) 500 MG 24 hr tablet Take 1,000 mg by mouth 2 times daily (with meals)        metoprolol tartrate (LOPRESSOR) 25 MG tablet Take 12.5 mg by mouth 2 times daily       nitroGLYcerin (NITROSTAT) 0.4 MG sublingual tablet For chest pain place 1 tablet under the tongue every 5 minutes for 3 doses. If symptoms persist 5 minutes after 2nd dose call 911. 30 tablet 1     olopatadine (PATADAY) 0.2 % ophthalmic solution Place 1 drop into both eyes daily as needed       omeprazole (PRILOSEC) 20 MG DR capsule Take 20 mg by mouth       polyethylene glycol (GOLYTELY/NULYTELY) 236 g suspension Take as directed in patient instructions: Drink 2000 mL at 6 PM the evening before and 2000 mL 4 hours before your procedure.       traZODone (DESYREL) 100 MG tablet Take 100 mg by mouth At Bedtime        venlafaxine (EFFEXOR-XR) 150 MG 24 hr capsule Take 150 mg by mouth daily         ALLERGIES     Allergies   Allergen Reactions     Amoxicillin-Pot Clavulanate GI Disturbance     Lisinopril Cough       PAST MEDICAL HISTORY:  Past Medical History:   Diagnosis Date     Anxiety      Arthritis      BPPV (benign paroxysmal positional vertigo)      CKD (chronic kidney disease)      Diabetes mellitus type 2 in nonobese (H)      GERD (gastroesophageal reflux disease)      Glaucoma (increased eye pressure)      HLD (hyperlipidemia)      Hypertension      IBS (irritable bowel syndrome)      Insomnia      GASPER (obstructive sleep apnea)        PAST  SURGICAL HISTORY:  Past Surgical History:   Procedure Laterality Date     CARDIAC SURGERY       CV ANGIOGRAM CORONARY GRAFT N/A 2019    Procedure: Angiogram Coronary Graft;  Surgeon: Hayden Muñiz MD;  Location: [facility]     CV INSTANTANEOUS WAVE-FREE RATIO N/A 2019    Procedure: Instantaneous Wave-Free Ratio;  Surgeon: Hayden Muñiz MD;  Location:  [facility]     CV LEFT HEART CATH N/A 2019    Procedure: Left Heart Cath;  Surgeon: Hayden Muñiz MD;  Location: [facility]     EYE SURGERY       ORTHOPEDIC SURGERY         FAMILY HISTORY:  Family History   Problem Relation Age of Onset     Arthritis Mother      Coronary Artery Disease Father      Cancer Father        SOCIAL HISTORY:  Social History     Socioeconomic History     Marital status: Single     Spouse name: None     Number of children: 1     Years of education: None     Highest education level: None   Occupational History     Occupation: retired   Social Needs     Financial resource strain: None     Food insecurity:     Worry: None     Inability: None     Transportation needs:     Medical: None     Non-medical: None   Tobacco Use     Smoking status: Former Smoker     Start date:      Last attempt to quit:      Years since quittin.9     Smokeless tobacco: Never Used   Substance and Sexual Activity     Alcohol use: Yes     Drug use: No     Sexual activity: None   Lifestyle     Physical activity:     Days per week: None     Minutes per session: None     Stress: None   Relationships     Social connections:     Talks on phone: None     Gets together: None     Attends Taoist service: None     Active member of club or organization: None     Attends meetings of clubs or organizations: None     Relationship status: None     Intimate partner violence:     Fear of current or ex partner: None     Emotionally abused: None     Physically abused: None     Forced sexual activity: None  "  Other Topics Concern     None   Social History Narrative    Retired, lives in a senior apartment.         Review of Systems:  Skin:  Negative       Eyes:  Positive for glasses    ENT:  Positive for hearing loss    Respiratory:  Positive for shortness of breath;sleep apnea sob when laying down at night   Cardiovascular:    palpitations;Positive for rare palpitations  Gastroenterology: Negative      Genitourinary:  Negative      Musculoskeletal:  Positive for arthritis;back pain;neck pain    Neurologic:  Negative      Psychiatric:  Positive for sleep disturbances    Heme/Lymph/Imm:  Negative      Endocrine:  Positive for diabetes      Physical Exam:  Vitals: Ht 1.651 m (5' 5\")   Wt 81 kg (178 lb 8 oz)   Breastfeeding No   BMI 29.70 kg/m      Constitutional:  cooperative, alert and oriented, well developed, well nourished, in no acute distress overweight      Skin:  warm and dry to the touch          Head:  normocephalic        Eyes:  pupils equal and round        Lymph:No Cervical lymphadenopathy present     ENT:  no pallor or cyanosis        Neck:  carotid pulses are full and equal bilaterally, JVP normal, no carotid bruit        Respiratory:  normal breath sounds, clear to auscultation, normal A-P diameter, normal symmetry, normal respiratory excursion, no use of accessory muscles         Cardiac: regular rhythm, normal S1/S2, no S3 or S4, apical impulse not displaced, no murmurs, gallops or rubs                pulses full and equal                                        GI:    obese      Extremities and Muscular Skeletal:  no deformities, clubbing, cyanosis, erythema observed;no edema              Neurological:  no gross motor deficits;affect appropriate        Psych:  Alert and Oriented x 3        CC  No referring provider defined for this encounter.              "

## 2019-12-20 NOTE — PROGRESS NOTES
Service Date: 12/20/2019      CARDIOLOGY CONSULTATION      REFERRING PHYSICIAN:  Dr. Madison Langley.      HISTORY OF PRESENT ILLNESS:  It is my pleasure to see your patient, Princess Edgar.  Princess Edgar is a 74-year-old patient with a past history of diabetes mellitus and coronary artery bypass grafting.  This patient had a LIMA to her left anterior descending artery and from what I can understand the LIMA was placed to the LAD because of possible restenosis of a previously placed proximal left anterior descending artery stent.  When she was seen on 06/10, she was complaining of chest discomfort.  The patient underwent coronary angiography.  This showed that the left internal mammary artery graft to the left anterior descending artery was occluded, but the left anterior descending artery stent was maybe only 50% restenosed and fractional flow reserve across that area showed that there was no flow limitation down through that left anterior descending artery stent or in the remainder of the left anterior descending artery.  No significant disease was present in the circumflex of the right coronary artery.  The reason that the LIMA graft occluded was that the flow was excellent on the LAD.  She was seen again in November for chest discomfort.  A stress echocardiogram showed no evidence of ischemia which would be predictable given the findings of the coronary angiogram 5 months previously.  One of her issues is hypertension.  She does appear to have orthostatic hypotension and we checked that today in our office.  When she was sitting her blood pressure was 122/60, when she stood it dropped to 90/60.  She does get episodes of some dizziness and lightheadedness.  It is likely that she has autonomic neuropathy related to her diabetes mellitus.  Vasodilators will tend to aggravate this and she is on 2 of them.  She is on losartan 50 mg per day which is a useful medication given that she has coronary disease and there is a  protective effect from angiotensin receptor blockers, but she is also on amlodipine 5 mg per day also.  She does take metoprolol tartrate 12.5 mg 2 times a day which would be less likely to cause orthostasis.      IMPRESSION:   1.  Coronary artery disease.  The patient has no flow limitation on both invasive and on noninvasive studies.  Most recent stress echo was a month ago showing no evidence of ischemia.   2.  Essential hypertension.   3.  Orthostatic hypotension.  This is possibly related to diabetic autonomic neuropathy, but would tend to be aggravated by vasodilator type medications.      PLAN:  I will stop the amlodipine medication entirely.  I have encouraged her to drink a lot of fluids to maintain intravascular volume.  Then, in a week's time off of Norvasc we will recheck her blood pressure with a 24-hour blood pressure monitor to see if she actually has hypertension off the Norvasc medication.  If she still has orthostatic symptoms, one could consider using support stockings which will tend to increase the intravascular volume.  One could also consider the use of Florinef.  The patient is due to see me back again in January and this was an old return visit appointment, but it works out very well for us.  We will see her again at that time.      cc:   Madison Langley MD   [facility] [address] [place] [address]         NAVIN SHELDON MD, [facility]             D: 2019   T: 2019   MT: MALVIN      Name:     HANS LLOYD   MRN:      [id number]        Account:      [id number]   :      1945           Service Date: 2019      Document: [id number]

## 2019-12-20 NOTE — LETTER
12/20/2019    Madison [facility] [address] [place] [address]    RE: Princess Edgar       Dear Colleague,    I had the pleasure of seeing Princess Edgar in the [facility].    HPI and Plan:   See dictation    Orders Placed This Encounter   Procedures     24 Hour Blood Pressure Monitor - Adult       Orders Placed This Encounter   Medications     bisacodyl (DULCOLAX) 5 MG EC tablet     Sig: Take 4 tablets by mouth once at 5 PM the evening before your procedure.     diphenoxylate-atropine (LOMOTIL) 2.5-0.025 MG tablet     Sig: Take 1 tablet by mouth     famotidine (PEPCID) 20 MG tablet     Sig: Take 20 mg by mouth     fluocinonide (LIDEX) 0.05 % external solution     omeprazole (PRILOSEC) 20 MG DR capsule     Sig: Take 20 mg by mouth     polyethylene glycol (GOLYTELY/NULYTELY) 236 g suspension     Sig: Take as directed in patient instructions: Drink 2000 mL at 6 PM the evening before and 2000 mL 4 hours before your procedure.       Medications Discontinued During This Encounter   Medication Reason     RANITIDINE HCL PO Medication Reconciliation Clean Up     amLODIPine (NORVASC) 5 MG tablet Side effects         Encounter Diagnoses   Name Primary?     Coronary artery disease involving native coronary artery of native heart without angina pectoris Yes     Benign essential hypertension      Hyperlipidemia LDL goal <70      Orthostatic hypotension      Elevated blood pressure reading without diagnosis of hypertension        CURRENT MEDICATIONS:  Current Outpatient Medications   Medication Sig Dispense Refill     albuterol (PROAIR HFA/PROVENTIL HFA/VENTOLIN HFA) 108 (90 Base) MCG/ACT inhaler Inhale 2 puffs into the lungs every 4 hours as needed for shortness of breath / dyspnea or wheezing       aspirin (ASA) 81 MG tablet Take 1 tablet (81 mg) by mouth daily       atorvastatin (LIPITOR) 20 MG tablet Take 20 mg by mouth At Bedtime       bisacodyl (DULCOLAX) 5 MG EC tablet  Take 4 tablets by mouth once at 5 PM the evening before your procedure.       diclofenac (VOLTAREN) 1 % topical gel Place 2 g onto the skin 4 times daily as needed        diphenoxylate-atropine (LOMOTIL) 2.5-0.025 MG tablet Take 1 tablet by mouth       famotidine (PEPCID) 20 MG tablet Take 20 mg by mouth       fexofenadine-pseudoePHEDrine (ALLEGRA-D)  MG 12 hr tablet Take 1 tablet by mouth 2 times daily Seasonal use       fluocinonide (LIDEX) 0.05 % external solution        fluticasone (FLONASE) 50 MCG/ACT nasal spray Spray 2 sprays into both nostrils daily as needed        LORAZEPAM PO Take 0.5 mg by mouth nightly as needed for anxiety        losartan (COZAAR) 50 MG tablet Take 50 mg by mouth daily       metFORMIN (GLUCOPHAGE-XR) 500 MG 24 hr tablet Take 1,000 mg by mouth 2 times daily (with meals)        metoprolol tartrate (LOPRESSOR) 25 MG tablet Take 12.5 mg by mouth 2 times daily       nitroGLYcerin (NITROSTAT) 0.4 MG sublingual tablet For chest pain place 1 tablet under the tongue every 5 minutes for 3 doses. If symptoms persist 5 minutes after 2nd dose call 911. 30 tablet 1     olopatadine (PATADAY) 0.2 % ophthalmic solution Place 1 drop into both eyes daily as needed       omeprazole (PRILOSEC) 20 MG DR capsule Take 20 mg by mouth       polyethylene glycol (GOLYTELY/NULYTELY) 236 g suspension Take as directed in patient instructions: Drink 2000 mL at 6 PM the evening before and 2000 mL 4 hours before your procedure.       traZODone (DESYREL) 100 MG tablet Take 100 mg by mouth At Bedtime        venlafaxine (EFFEXOR-XR) 150 MG 24 hr capsule Take 150 mg by mouth daily         ALLERGIES     Allergies   Allergen Reactions     Amoxicillin-Pot Clavulanate GI Disturbance     Lisinopril Cough       PAST MEDICAL HISTORY:  Past Medical History:   Diagnosis Date     Anxiety      Arthritis      BPPV (benign paroxysmal positional vertigo)      CKD (chronic kidney disease)      Diabetes mellitus type 2 in nonobese  (H)      GERD (gastroesophageal reflux disease)      Glaucoma (increased eye pressure)      HLD (hyperlipidemia)      Hypertension      IBS (irritable bowel syndrome)      Insomnia      GASPER (obstructive sleep apnea)        PAST SURGICAL HISTORY:  Past Surgical History:   Procedure Laterality Date     CARDIAC SURGERY       CV ANGIOGRAM CORONARY GRAFT N/A 2019    Procedure: Angiogram Coronary Graft;  Surgeon: Hayden Muñiz MD;  Location: [facility]     CV INSTANTANEOUS WAVE-FREE RATIO N/A 2019    Procedure: Instantaneous Wave-Free Ratio;  Surgeon: Hayden Muñiz MD;  Location: [facility]     CV LEFT HEART CATH N/A 2019    Procedure: Left Heart Cath;  Surgeon: Hayden Muñiz MD;  Location: [facility]     EYE SURGERY       ORTHOPEDIC SURGERY         FAMILY HISTORY:  Family History   Problem Relation Age of Onset     Arthritis Mother      Coronary Artery Disease Father      Cancer Father        SOCIAL HISTORY:  Social History     Socioeconomic History     Marital status: Single     Spouse name: None     Number of children: 1     Years of education: None     Highest education level: None   Occupational History     Occupation: retired   Social Needs     Financial resource strain: None     Food insecurity:     Worry: None     Inability: None     Transportation needs:     Medical: None     Non-medical: None   Tobacco Use     Smoking status: Former Smoker     Start date:      Last attempt to quit:      Years since quittin.9     Smokeless tobacco: Never Used   Substance and Sexual Activity     Alcohol use: Yes     Drug use: No     Sexual activity: None   Lifestyle     Physical activity:     Days per week: None     Minutes per session: None     Stress: None   Relationships     Social connections:     Talks on phone: None     Gets together: None     Attends [employer]ist service: None     Active member of club or organization: None     Attends  "meetings of clubs or organizations: None     Relationship status: None     Intimate partner violence:     Fear of current or ex partner: None     Emotionally abused: None     Physically abused: None     Forced sexual activity: None   Other Topics Concern     None   Social History Narrative    Retired, lives in a senior apartment.         Review of Systems:  Skin:  Negative       Eyes:  Positive for glasses    ENT:  Positive for hearing loss    Respiratory:  Positive for shortness of breath;sleep apnea sob when laying down at night   Cardiovascular:    palpitations;Positive for rare palpitations  Gastroenterology: Negative      Genitourinary:  Negative      Musculoskeletal:  Positive for arthritis;back pain;neck pain    Neurologic:  Negative      Psychiatric:  Positive for sleep disturbances    Heme/Lymph/Imm:  Negative      Endocrine:  Positive for diabetes      Physical Exam:  Vitals: Ht 1.651 m (5' 5\")   Wt 81 kg (178 lb 8 oz)   Breastfeeding No   BMI 29.70 kg/m       Constitutional:  cooperative, alert and oriented, well developed, well nourished, in no acute distress overweight      Skin:  warm and dry to the touch          Head:  normocephalic        Eyes:  pupils equal and round        Lymph:No Cervical lymphadenopathy present     ENT:  no pallor or cyanosis        Neck:  carotid pulses are full and equal bilaterally, JVP normal, no carotid bruit        Respiratory:  normal breath sounds, clear to auscultation, normal A-P diameter, normal symmetry, normal respiratory excursion, no use of accessory muscles         Cardiac: regular rhythm, normal S1/S2, no S3 or S4, apical impulse not displaced, no murmurs, gallops or rubs                pulses full and equal                                        GI:    obese      Extremities and Muscular Skeletal:  no deformities, clubbing, cyanosis, erythema observed;no edema              Neurological:  no gross motor deficits;affect appropriate        Psych:  Alert and " Oriented x 3        CC  No referring provider defined for this encounter.                Thank you for allowing me to participate in the care of your patient.      Sincerely,     Filipe Waggoner MD, MD     [facility]    cc:   No referring provider defined for this encounter.

## 2019-12-20 NOTE — LETTER
12/20/2019      [facility] [address] [place] [address]      RE: Princess Edgar       Dear Colleague,    I had the pleasure of seeing Princess Edgar in the [facility].    Service Date: 12/20/2019      CARDIOLOGY CONSULTATION      REFERRING PHYSICIAN:  Dr. Madison Langley.      HISTORY OF PRESENT ILLNESS:  It is my pleasure to see your patient, Princess Edgar.  Princess Edgar is a 74-year-old patient with a past history of diabetes mellitus and coronary artery bypass grafting.  This patient had a LIMA to her left anterior descending artery and from what I can understand the LIMA was placed to the LAD because of possible restenosis of a previously placed proximal left anterior descending artery stent.  When she was seen on 06/10, she was complaining of chest discomfort.  The patient underwent coronary angiography.  This showed that the left internal mammary artery graft to the left anterior descending artery was occluded, but the left anterior descending artery stent was maybe only 50% restenosed and fractional flow reserve across that area showed that there was no flow limitation down through that left anterior descending artery stent or in the remainder of the left anterior descending artery.  No significant disease was present in the circumflex of the right coronary artery.  The reason that the LIMA graft occluded was that the flow was excellent on the LAD.  She was seen again in November for chest discomfort.  A stress echocardiogram showed no evidence of ischemia which would be predictable given the findings of the coronary angiogram 5 months previously.  One of her issues is hypertension.  She does appear to have orthostatic hypotension and we checked that today in our office.  When she was sitting her blood pressure was 122/60, when she stood it dropped to 90/60.  She does get episodes of some dizziness and lightheadedness.  It is likely that she has  autonomic neuropathy related to her diabetes mellitus.  Vasodilators will tend to aggravate this and she is on 2 of them.  She is on losartan 50 mg per day which is a useful medication given that she has coronary disease and there is a protective effect from angiotensin receptor blockers, but she is also on amlodipine 5 mg per day also.  She does take metoprolol tartrate 12.5 mg 2 times a day which would be less likely to cause orthostasis.      IMPRESSION:   1.  Coronary artery disease.  The patient has no flow limitation on both invasive and on noninvasive studies.  Most recent stress echo was a month ago showing no evidence of ischemia.   2.  Essential hypertension.   3.  Orthostatic hypotension.  This is possibly related to diabetic autonomic neuropathy, but would tend to be aggravated by vasodilator type medications.      PLAN:  I will stop the amlodipine medication entirely.  I have encouraged her to drink a lot of fluids to maintain intravascular volume.  Then, in a week's time off of Norvasc we will recheck her blood pressure with a 24-hour blood pressure monitor to see if she actually has hypertension off the Norvasc medication.  If she still has orthostatic symptoms, one could consider using support stockings which will tend to increase the intravascular volume.  One could also consider the use of Florinef.  The patient is due to see me back again in January and this was an old return visit appointment, but it works out very well for us.  We will see her again at that time.      cc:   Madison Langley MD   [facility]    [address] [place]  [address]         NAVIN SHELDON MD, [facility]             D: 2019   T: 2019   MT: MALVIN      Name:     HANS LLOYD   MRN:      [id number]        Account:      [id number]   :      1945           Service Date: 2019      Document: [id number]         Outpatient Encounter Medications as of 2019   Medication Sig Dispense  Refill     albuterol (PROAIR HFA/PROVENTIL HFA/VENTOLIN HFA) 108 (90 Base) MCG/ACT inhaler Inhale 2 puffs into the lungs every 4 hours as needed for shortness of breath / dyspnea or wheezing       aspirin (ASA) 81 MG tablet Take 1 tablet (81 mg) by mouth daily       atorvastatin (LIPITOR) 20 MG tablet Take 20 mg by mouth At Bedtime       bisacodyl (DULCOLAX) 5 MG EC tablet Take 4 tablets by mouth once at 5 PM the evening before your procedure.       diclofenac (VOLTAREN) 1 % topical gel Place 2 g onto the skin 4 times daily as needed        diphenoxylate-atropine (LOMOTIL) 2.5-0.025 MG tablet Take 1 tablet by mouth       famotidine (PEPCID) 20 MG tablet Take 20 mg by mouth       fexofenadine-pseudoePHEDrine (ALLEGRA-D)  MG 12 hr tablet Take 1 tablet by mouth 2 times daily Seasonal use       fluocinonide (LIDEX) 0.05 % external solution        fluticasone (FLONASE) 50 MCG/ACT nasal spray Spray 2 sprays into both nostrils daily as needed        LORAZEPAM PO Take 0.5 mg by mouth nightly as needed for anxiety        losartan (COZAAR) 50 MG tablet Take 50 mg by mouth daily       metFORMIN (GLUCOPHAGE-XR) 500 MG 24 hr tablet Take 1,000 mg by mouth 2 times daily (with meals)        metoprolol tartrate (LOPRESSOR) 25 MG tablet Take 12.5 mg by mouth 2 times daily       nitroGLYcerin (NITROSTAT) 0.4 MG sublingual tablet For chest pain place 1 tablet under the tongue every 5 minutes for 3 doses. If symptoms persist 5 minutes after 2nd dose call 911. 30 tablet 1     olopatadine (PATADAY) 0.2 % ophthalmic solution Place 1 drop into both eyes daily as needed       omeprazole (PRILOSEC) 20 MG DR capsule Take 20 mg by mouth       polyethylene glycol (GOLYTELY/NULYTELY) 236 g suspension Take as directed in patient instructions: Drink 2000 mL at 6 PM the evening before and 2000 mL 4 hours before your procedure.       traZODone (DESYREL) 100 MG tablet Take 100 mg by mouth At Bedtime        venlafaxine (EFFEXOR-XR) 150 MG 24 hr  capsule Take 150 mg by mouth daily       [DISCONTINUED] amLODIPine (NORVASC) 5 MG tablet Take 0.5 tablets (2.5 mg) by mouth daily       [DISCONTINUED] RANITIDINE HCL PO Take 150 mg by mouth 2 times daily        No facility-administered encounter medications on file as of 12/20/2019.        Again, thank you for allowing me to participate in the care of your patient.      Sincerely,    Filipe Waggoner MD, MD     [facility]

## 2020-01-23 ENCOUNTER — HOSPITAL ENCOUNTER (OUTPATIENT)
Dept: CARDIOLOGY | Facility: CLINIC | Age: 75
Discharge: HOME | End: 2020-01-23
Attending: INTERNAL MEDICINE | Admitting: INTERNAL MEDICINE
Payer: COMMERCIAL

## 2020-01-23 DIAGNOSIS — R03.0 ELEVATED BLOOD PRESSURE READING WITHOUT DIAGNOSIS OF HYPERTENSION: ICD-10-CM

## 2020-01-23 PROCEDURE — 93788 AMBL BP MNTR W/SW A/R: CPT

## 2020-01-23 PROCEDURE — 93790 AMBL BP MNTR W/SW I&R: CPT | Performed by: INTERNAL MEDICINE

## 2020-02-03 ENCOUNTER — OFFICE VISIT (OUTPATIENT)
Dept: CARDIOLOGY | Facility: CLINIC | Age: 75
End: 2020-02-03
Attending: NURSE PRACTITIONER
Payer: COMMERCIAL

## 2020-02-03 VITALS
WEIGHT: 176 LBS | SYSTOLIC BLOOD PRESSURE: 110 MMHG | HEART RATE: 54 BPM | OXYGEN SATURATION: 98 % | BODY MASS INDEX: 29.32 KG/M2 | HEIGHT: 65 IN | DIASTOLIC BLOOD PRESSURE: 62 MMHG

## 2020-02-03 DIAGNOSIS — I25.10 CORONARY ARTERY DISEASE INVOLVING NATIVE CORONARY ARTERY OF NATIVE HEART WITHOUT ANGINA PECTORIS: ICD-10-CM

## 2020-02-03 DIAGNOSIS — I95.1 ORTHOSTATIC HYPOTENSION: Primary | ICD-10-CM

## 2020-02-03 DIAGNOSIS — E78.5 HYPERLIPIDEMIA LDL GOAL <70: ICD-10-CM

## 2020-02-03 PROCEDURE — 99214 OFFICE O/P EST MOD 30 MIN: CPT | Performed by: NURSE PRACTITIONER

## 2020-02-03 ASSESSMENT — MIFFLIN-ST. JEOR: SCORE: 1304.21

## 2020-02-03 NOTE — PROGRESS NOTES
Cardiology Clinic Progress Note  Princess Edgar MRN# [id number]   YOB: 1945 Age: 75 year old     Reason For Visit:  Follow up 24 hr blood    Primary Cardiologist:   Dr. Waggoner          History of Presenting Illness:    Princess Edgar is a pleasant 75 year old patient who carries a past medical history significant for artery disease status post CABG greater than 20 years ago, hypertension, diabetes, depression, anxiety, GERD, and hyperlipidemia.     She was last seen on 12/20/19 for routine follow up. She was noted to have orthostatic hypotension with her systolic pressure dropping ~ 20 points, accompanied by dizziness and lightheadedness. Amlodipine was discontinued. After a 1 week wash out she wore a 24 blood pressure monitor that demonstrated an average blood pressure of 148/66. She returns to the office today for review of results.     She continues to report positional lightheadedness that resolves after ~ 1 min. Today blood pressure was 110/62 sitting and 102/58 standing. She denies chest pain, shortness of breath, PND, orthopnea, presyncope, syncope, edema, heart racing, or palpitations. Upon exam, lungs are clear bilaterally, heart rate and rhythm regular, no abdominal distension or LE edema. She admits to no regular exercise. 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     In review of her medications, she elected to restart her amlodipine shortly after it was discontinued. Follow up with her PCP notes to decrease losartan to 75mg daily. However, she continues on Losartan 100 mg in the am and metoprolol 12.5mg twice daily. Over the last 2 weeks, she states she has stopped drinking water as she thought she was in kidney failure. Last BMP demonstrates a sodium of 142, potassium 4.7, BUN 28, Creatinine 1.2, and GFR 45. She also reports a acute flare of her IBS with 3+ diarrhea episodes per day.     Last lipid panel showed a total cholesterol of 138, LDL 70, HDL 50, and TG  90    She admits she has been depressed since the loss of her , and does not have a strong social network.   She does not sleep well with reported daytime fatigue. States she has had a negative sleep study in the past.                        Assessment and Plan:     1.  Orthostatic Hyportension - Blood pressure today is stable at 110/62 sitting and 102/58 standing. 24 hr BP monitor demonstrated an average blood pressure of 148/66. I will stop amlodipine due to its vasodilation response and separate losartan to 50mg twice daily. Monitor blood pressure twice daily with a goal of < 130/90. I have encouraged her to hydrate well especially in the setting of acute IBS flare. I reviewed recent labs with her and reassured her that she is not in kidney failure. I recommend compression stockings to assist with symptoms of hypotension, which she is not interested at this time.     2.  Coronary artery disease -status post remote stent to LAD followed by single-vessel CABG with LIMA to the LAD 1999.  Recent coronary angiogram demonstrated no flow-limiting lesions, moderate in-stent restenosis in the ostial proximal LAD stent.  LIMA graft is occluded at its midportion likely due to the flow that is good to the native LAD.  No significant disease in the RCA or circumflex.  Encourage exercise and weight loss    3.  Hyperlipidemia -LDL at goal. Continue on statin             Thank you for allowing me to participate in this delightful patient's care.   Follow up in the clinic in 2 weeks for evaluation of symptoms.     SKYE Fernandez, CNP          Review of Systems:   Review of Systems:  Skin:  Negative     Eyes:  Positive for glasses  ENT:  Positive for hearing loss  Respiratory:  Positive for shortness of breath;sleep apnea  Cardiovascular:  Negative fatigue;lightheadedness;Positive for  Gastroenterology: Negative heartburn  Genitourinary:  Negative    Musculoskeletal:  Positive for arthritis;back pain;neck  pain  Neurologic:  Negative    Psychiatric:  Negative    Heme/Lymph/Imm:  Negative    Endocrine:  Positive for diabetes              Physical Exam:     GEN:  In general, this is a overweight female in no acute distress.  HEENT:  Pupils equal, round. Sclerae nonicteric. Clear oropharynx. Mucous membranes moist.  NECK: Supple, no masses appreciated. Trachea midline.  No JVD   C/V:  Regular rate and rhythm, systolic murmur, rub or gallop. No S3 or RV heave.   RESP: Respirations are unlabored. No use of accessory muscles. Clear to auscultation bilaterally without wheezing, rales, or rhonchi.  GI: Abdomen soft, nontender, nondistended. No HSM appreciated.   EXTREM: No LE edema. No cyanosis or clubbing.  NEURO: Alert and oriented, cooperative. No obvious focal deficits.   PSYCH: Normal affect. Depressed  SKIN: Warm and dry. No rashes or petechiae appreciated.          Past Medical History:     Past Medical History:   Diagnosis Date     Anxiety      Arthritis      BPPV (benign paroxysmal positional vertigo)      CKD (chronic kidney disease)      Diabetes mellitus type 2 in nonobese (H)      GERD (gastroesophageal reflux disease)      Glaucoma (increased eye pressure)      HLD (hyperlipidemia)      Hypertension      IBS (irritable bowel syndrome)      Insomnia      GASPER (obstructive sleep apnea)               Past Surgical History:     Past Surgical History:   Procedure Laterality Date     CARDIAC SURGERY       CV ANGIOGRAM CORONARY GRAFT N/A 6/11/2019    Procedure: Angiogram Coronary Graft;  Surgeon: Hayden Muñiz MD;  Location:  HEART CARDIAC CATH LAB     CV INSTANTANEOUS WAVE-FREE RATIO N/A 6/11/2019    Procedure: Instantaneous Wave-Free Ratio;  Surgeon: Hayden Muñiz MD;  Location:  HEART CARDIAC CATH LAB     CV LEFT HEART CATH N/A 6/11/2019    Procedure: Left Heart Cath;  Surgeon: Hayden Muñiz MD;  Location:  HEART CARDIAC CATH LAB     EYE SURGERY       ORTHOPEDIC SURGERY                Allergies:    Amoxicillin-pot clavulanate and Lisinopril       Data:   All laboratory data reviewed:    LAST BMP:  Lab Results   Component Value Date     06/10/2019      Lab Results   Component Value Date    POTASSIUM 5.0 06/12/2019     Lab Results   Component Value Date    CHLORIDE 108 06/10/2019     Lab Results   Component Value Date    DORIE 8.3 06/10/2019     Lab Results   Component Value Date    CO2 28 06/10/2019     Lab Results   Component Value Date    BUN 14 06/10/2019     Lab Results   Component Value Date    CR 0.88 06/10/2019     Lab Results   Component Value Date     06/10/2019       LAST CBC:  Lab Results   Component Value Date    WBC 8.3 06/10/2019     Lab Results   Component Value Date    RBC 4.27 06/10/2019     Lab Results   Component Value Date    HGB 12.9 06/10/2019     Lab Results   Component Value Date    HCT 41.4 06/10/2019     Lab Results   Component Value Date    MCV 97 06/10/2019     Lab Results   Component Value Date    MCH 30.2 06/10/2019     Lab Results   Component Value Date    MCHC 31.2 06/10/2019     Lab Results   Component Value Date    RDW 13.2 06/10/2019     Lab Results   Component Value Date     06/10/2019

## 2020-02-03 NOTE — LETTER
2/3/2020    [facility] [address] [place] [address]    RE: Princess Edgar       Dear Colleague,    I had the pleasure of seeing Princess Edgar in the [facility].    Cardiology Clinic Progress Note  Princess Edgar MRN# [id number]   YOB: 1945 Age: 75 year old     Reason For Visit:  Follow up 24 hr blood    Primary Cardiologist:   Dr. Waggoner          History of Presenting Illness:    Princess Edgar is a pleasant 75 year old patient who carries a past medical history significant for artery disease status post CABG greater than 20 years ago, hypertension, diabetes, depression, anxiety, GERD, and hyperlipidemia.     She was last seen on 12/20/19 for routine follow up. She was noted to have orthostatic hypotension with her systolic pressure dropping ~ 20 points, accompanied by dizziness and lightheadedness. Amlodipine was discontinued. After a 1 week wash out she wore a 24 blood pressure monitor that demonstrated an average blood pressure of 148/66. She returns to the office today for review of results.     She continues to report positional lightheadedness that resolves after ~ 1 min. Today blood pressure was 110/62 sitting and 102/58 standing. She denies chest pain, shortness of breath, PND, orthopnea, presyncope, syncope, edema, heart racing, or palpitations. Upon exam, lungs are clear bilaterally, heart rate and rhythm regular, no abdominal distension or LE edema. She admits to no regular exercise. 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     In review of her medications, she elected to restart her amlodipine shortly after it was discontinued. Follow up with her PCP notes to decrease losartan to 75mg daily. However, she continues on Losartan 100 mg in the am and metoprolol 12.5mg twice daily. Over the last 2 weeks, she states she has stopped drinking water as she thought she was in kidney failure. Last BMP demonstrates a  sodium of 142, potassium 4.7, BUN 28, Creatinine 1.2, and GFR 45. She also reports a acute flare of her IBS with 3+ diarrhea episodes per day.     Last lipid panel showed a total cholesterol of 138, LDL 70, HDL 50, and TG 90    She admits she has been depressed since the loss of her , and does not have a strong social network.   She does not sleep well with reported daytime fatigue. States she has had a negative sleep study in the past.                        Assessment and Plan:     1.  Orthostatic Hyportension - Blood pressure today is stable at 110/62 sitting and 102/58 standing. 24 hr BP monitor demonstrated an average blood pressure of 148/66. I will stop amlodipine due to its vasodilation response and separate losartan to 50mg twice daily. Monitor blood pressure twice daily with a goal of < 130/90. I have encouraged her to hydrate well especially in the setting of acute IBS flare. I reviewed recent labs with her and reassured her that she is not in kidney failure. I recommend compression stockings to assist with symptoms of hypotension, which she is not interested at this time.     2.  Coronary artery disease -status post remote stent to LAD followed by single-vessel CABG with LIMA to the LAD 1999.  Recent coronary angiogram demonstrated no flow-limiting lesions, moderate in-stent restenosis in the ostial proximal LAD stent.  LIMA graft is occluded at its midportion likely due to the flow that is good to the native LAD.  No significant disease in the RCA or circumflex.  Encourage exercise and weight loss    3.  Hyperlipidemia -LDL at goal. Continue on statin             Thank you for allowing me to participate in this delightful patient's care.   Follow up in the clinic in 2 weeks for evaluation of symptoms.     India Wilson, SKYE, CNP          Review of Systems:   Review of Systems:  Skin:  Negative     Eyes:  Positive for glasses  ENT:  Positive for hearing loss  Respiratory:  Positive for  shortness of breath;sleep apnea  Cardiovascular:  Negative fatigue;lightheadedness;Positive for  Gastroenterology: Negative heartburn  Genitourinary:  Negative    Musculoskeletal:  Positive for arthritis;back pain;neck pain  Neurologic:  Negative    Psychiatric:  Negative    Heme/Lymph/Imm:  Negative    Endocrine:  Positive for diabetes              Physical Exam:     GEN:  In general, this is a overweight female in no acute distress.  HEENT:  Pupils equal, round. Sclerae nonicteric. Clear oropharynx. Mucous membranes moist.  NECK: Supple, no masses appreciated. Trachea midline.  No JVD   C/V:  Regular rate and rhythm, systolic murmur, rub or gallop. No S3 or RV heave.   RESP: Respirations are unlabored. No use of accessory muscles. Clear to auscultation bilaterally without wheezing, rales, or rhonchi.  GI: Abdomen soft, nontender, nondistended. No HSM appreciated.   EXTREM: No LE edema. No cyanosis or clubbing.  NEURO: Alert and oriented, cooperative. No obvious focal deficits.   PSYCH: Normal affect. Depressed  SKIN: Warm and dry. No rashes or petechiae appreciated.          Past Medical History:     Past Medical History:   Diagnosis Date     Anxiety      Arthritis      BPPV (benign paroxysmal positional vertigo)      CKD (chronic kidney disease)      Diabetes mellitus type 2 in nonobese (H)      GERD (gastroesophageal reflux disease)      Glaucoma (increased eye pressure)      HLD (hyperlipidemia)      Hypertension      IBS (irritable bowel syndrome)      Insomnia      GASPER (obstructive sleep apnea)               Past Surgical History:     Past Surgical History:   Procedure Laterality Date     CARDIAC SURGERY       CV ANGIOGRAM CORONARY GRAFT N/A 6/11/2019    Procedure: Angiogram Coronary Graft;  Surgeon: Hayden Muñiz MD;  Location: [facility] CARDIAC CATH LAB     CV INSTANTANEOUS WAVE-FREE RATIO N/A 6/11/2019    Procedure: Instantaneous Wave-Free Ratio;  Surgeon: Hayden Muñiz MD;  Location: [facility]  CARDIAC CATH LAB     CV LEFT HEART CATH N/A 6/11/2019    Procedure: Left Heart Cath;  Surgeon: Hayden Muñiz MD;  Location: [facility] HEART CARDIAC CATH LAB     EYE SURGERY       ORTHOPEDIC SURGERY                Allergies:   Amoxicillin-pot clavulanate and Lisinopril       Data:   All laboratory data reviewed:    LAST BMP:  Lab Results   Component Value Date     06/10/2019      Lab Results   Component Value Date    POTASSIUM 5.0 06/12/2019     Lab Results   Component Value Date    CHLORIDE 108 06/10/2019     Lab Results   Component Value Date    DORIE 8.3 06/10/2019     Lab Results   Component Value Date    CO2 28 06/10/2019     Lab Results   Component Value Date    BUN 14 06/10/2019     Lab Results   Component Value Date    CR 0.88 06/10/2019     Lab Results   Component Value Date     06/10/2019       LAST CBC:  Lab Results   Component Value Date    WBC 8.3 06/10/2019     Lab Results   Component Value Date    RBC 4.27 06/10/2019     Lab Results   Component Value Date    HGB 12.9 06/10/2019     Lab Results   Component Value Date    HCT 41.4 06/10/2019     Lab Results   Component Value Date    MCV 97 06/10/2019     Lab Results   Component Value Date    MCH 30.2 06/10/2019     Lab Results   Component Value Date    MCHC 31.2 06/10/2019     Lab Results   Component Value Date    RDW 13.2 06/10/2019     Lab Results   Component Value Date     06/10/2019             Thank you for allowing me to participate in the care of your patient.    Sincerely,     SKYE Fernandez [facility]

## 2020-02-03 NOTE — PATIENT INSTRUCTIONS
Thanks for coming into [facility] today.    Reviewed 24 hr blood pressure monitor, Average 148/68 ( not ideal)  Continues with lightheadedness from sitting to standing   Recommend stopping Norvasc  Split losartan to 50mg twice daily  Continue on all other medications   Recommend hydration  Encourage activity  Recommend compression stockings  Monitor blood pressure twice daily, goal < 130/80  Follow up in 2 weeks with DELORES       Please call my nurse at [phone] with any questions or concerns.    Scheduling phone number: [phone]  Reminder: Please bring in all current medications, over the counter supplements and vitamin bottles to your next appointment.

## 2020-02-17 ENCOUNTER — OFFICE VISIT (OUTPATIENT)
Dept: CARDIOLOGY | Facility: CLINIC | Age: 75
End: 2020-02-17
Attending: NURSE PRACTITIONER
Payer: COMMERCIAL

## 2020-02-17 VITALS
HEART RATE: 72 BPM | HEIGHT: 65 IN | WEIGHT: 177.9 LBS | BODY MASS INDEX: 29.64 KG/M2 | SYSTOLIC BLOOD PRESSURE: 142 MMHG | DIASTOLIC BLOOD PRESSURE: 74 MMHG

## 2020-02-17 DIAGNOSIS — E78.5 HYPERLIPIDEMIA LDL GOAL <70: ICD-10-CM

## 2020-02-17 DIAGNOSIS — I25.10 CORONARY ARTERY DISEASE INVOLVING NATIVE CORONARY ARTERY OF NATIVE HEART WITHOUT ANGINA PECTORIS: ICD-10-CM

## 2020-02-17 DIAGNOSIS — I10 ESSENTIAL HYPERTENSION: Primary | ICD-10-CM

## 2020-02-17 DIAGNOSIS — I95.1 ORTHOSTATIC HYPOTENSION: ICD-10-CM

## 2020-02-17 PROCEDURE — 99214 OFFICE O/P EST MOD 30 MIN: CPT | Performed by: NURSE PRACTITIONER

## 2020-02-17 RX ORDER — IRBESARTAN 150 MG/1
150 TABLET ORAL AT BEDTIME
Qty: 30 TABLET | Refills: 3 | Status: SHIPPED | OUTPATIENT
Start: 2020-02-17 | End: 2020-02-21

## 2020-02-17 ASSESSMENT — MIFFLIN-ST. JEOR: SCORE: 1312.83

## 2020-02-17 NOTE — PATIENT INSTRUCTIONS
Thanks for coming into [facility] clinic today.    Blood pressures elevated today, no recurrent episodes of hypotension.  Recommend switching losartan to irbesartan 150mg daily  Monitor blood pressures daily with a goal of < 130/90  Follow up BMP in 1 week  Follow up in 2 week with DELORES for BP recheck       Please call my nurse at [phone] with any questions or concerns.    Scheduling phone number: [phone]  Reminder: Please bring in all current medications, over the counter supplements and vitamin bottles to your next appointment.

## 2020-02-17 NOTE — PROGRESS NOTES
"Cardiology Clinic Progress Note  Princess Edgar MRN# [id number]   YOB: 1945 Age: 75 year old     Reason For Visit:  2 week follow up   Primary Cardiologist:   Dr. Waggoner          History of Presenting Illness:    Princess Edgar is a pleasant 75 year old patient who carries a past medical history significant for artery disease status post CABG greater than 20 years ago, hypertension, orthostatic hypotension, diabetes, depression, anxiety, GERD, and hyperlipidemia. She returns to the office today accompanied by a friend for a 2 week follow up.     She was last seen on 2/3/20 in follow up to her orthostatic hypotension. Please refer to that office visit for a more complete HPI. She was instructed to stop amlodipine and split losartan in divided doses of 50 mg twice daily. Unfortunately, her blood pressures have remained elevated ranging from 140-160's systolic, accompanied by headache. However, her lightheadedness has completely resolved after stopping amlodipine. Blood pressure today was 142/74 sitting and 138/78 when standing. Of note, her home BP unit is consistently 15 points higher systolic when compared to office reading. She denies chest pain, shortness of breath, PND, orthopnea, presyncope, syncope, edema, heart racing, or palpitations. Upon exam, lungs are clear bilaterally, heart rate and rhythm regular, no abdominal distension or LE edema.     She does not engage in any routine exercise with the exception of an \"inversion table.\"  Follows a low sodium diet.   Eating and sleeping well   Compliant with all medications.                          Assessment and Plan:     1. Hypertension/Orthostatic hypotension - Symptomatic hypertension since stopping amlodipine. No further recurrent orthostatic hypotension. Recommend switching losartan to irbesartan 150mg daily. Continue on metoprolol. Monitor BP daily with a goal < 130/90. Be aware patients home BP unit is ~ 15 points higher systolic than " office reading. Encourage hydration, exercise, and heart healthy low sodium diet. Follow up BMP in 1 week     2.  Coronary artery disease -status post remote stent to LAD followed by single-vessel CABG with LIMA to the LAD 1999.  Recent coronary angiogram demonstrated no flow-limiting lesions, moderate in-stent restenosis in the ostial proximal LAD stent.  LIMA graft is occluded at its midportion likely due to the flow that is good to the native LAD.  No significant disease in the RCA or circumflex.  Encourage exercise and weight loss    3.  Hyperlipidemia -LDL at goal. Continue on statin               Thank you for allowing me to participate in this delightful patient's care.   Follow up in the clinic in 2 weeks for evaluation of symptoms.     India Wilson, APRN, CNP          Review of Systems:   Review of Systems:  Skin:  Positive for     Eyes:  Positive for glasses;glaucoma  ENT:  Positive for hearing loss  Respiratory:  Positive for shortness of breath;sleep apnea  Cardiovascular:    Positive for;palpitations;lightheadedness;fatigue  Gastroenterology: Negative    Genitourinary:  Negative    Musculoskeletal:  Positive for arthritis;neck pain  Neurologic:  Positive for headaches  Psychiatric:  Negative    Heme/Lymph/Imm:  Negative    Endocrine:  Positive for diabetes              Physical Exam:     GEN:  In general, this is a overweight female in no acute distress.  HEENT:  Pupils equal, round. Sclerae nonicteric. Clear oropharynx. Mucous membranes moist.  NECK: Supple, no masses appreciated. Trachea midline.  No JVD   C/V:  Regular rate and rhythm, systolic murmur, rub or gallop. No S3 or RV heave.   RESP: Respirations are unlabored. No use of accessory muscles. Clear to auscultation bilaterally without wheezing, rales, or rhonchi.  GI: Abdomen soft, nontender, nondistended. No HSM appreciated.   EXTREM: No LE edema. No cyanosis or clubbing.  NEURO: Alert and oriented, cooperative. No obvious focal deficits.    PSYCH: Normal affect. Depressed  SKIN: Warm and dry. No rashes or petechiae appreciated.          Past Medical History:     Past Medical History:   Diagnosis Date     Anxiety      Arthritis      BPPV (benign paroxysmal positional vertigo)      CKD (chronic kidney disease)      Diabetes mellitus type 2 in nonobese (H)      GERD (gastroesophageal reflux disease)      Glaucoma (increased eye pressure)      HLD (hyperlipidemia)      Hypertension      IBS (irritable bowel syndrome)      Insomnia      GASPER (obstructive sleep apnea)               Past Surgical History:     Past Surgical History:   Procedure Laterality Date     CARDIAC SURGERY       CV ANGIOGRAM CORONARY GRAFT N/A 6/11/2019    Procedure: Angiogram Coronary Graft;  Surgeon: Hayden Muñiz MD;  Location: [facility]     CV INSTANTANEOUS WAVE-FREE RATIO N/A 6/11/2019    Procedure: Instantaneous Wave-Free Ratio;  Surgeon: Hayden Muñiz MD;  Location: [facility]     CV LEFT HEART CATH N/A 6/11/2019    Procedure: Left Heart Cath;  Surgeon: Hayden Muñiz MD;  Location: [facility]     EYE SURGERY       ORTHOPEDIC SURGERY                Allergies:   Amoxicillin-pot clavulanate and Lisinopril       Data:   All laboratory data reviewed:    LAST BMP:  Lab Results   Component Value Date     06/10/2019      Lab Results   Component Value Date    POTASSIUM 5.0 06/12/2019     Lab Results   Component Value Date    CHLORIDE 108 06/10/2019     Lab Results   Component Value Date    DORIE 8.3 06/10/2019     Lab Results   Component Value Date    CO2 28 06/10/2019     Lab Results   Component Value Date    BUN 14 06/10/2019     Lab Results   Component Value Date    CR 0.88 06/10/2019     Lab Results   Component Value Date     06/10/2019       LAST CBC:  Lab Results   Component Value Date    WBC 8.3 06/10/2019     Lab Results   Component Value Date    RBC 4.27 06/10/2019     Lab Results   Component Value Date    HGB  12.9 06/10/2019     Lab Results   Component Value Date    HCT 41.4 06/10/2019     Lab Results   Component Value Date    MCV 97 06/10/2019     Lab Results   Component Value Date    MCH 30.2 06/10/2019     Lab Results   Component Value Date    MCHC 31.2 06/10/2019     Lab Results   Component Value Date    RDW 13.2 06/10/2019     Lab Results   Component Value Date     06/10/2019

## 2020-02-17 NOTE — LETTER
"2/17/2020    [facility] [address] [place] [address]    RE: Princess Edgar       Dear Colleague,    I had the pleasure of seeing Princess Edgar in the [facility].    Cardiology Clinic Progress Note  Princess Edgar MRN# [id number]   YOB: 1945 Age: 75 year old     Reason For Visit:  2 week follow up   Primary Cardiologist:   Dr. Waggoner          History of Presenting Illness:    Princess Edgar is a pleasant 75 year old patient who carries a past medical history significant for artery disease status post CABG greater than 20 years ago, hypertension, orthostatic hypotension, diabetes, depression, anxiety, GERD, and hyperlipidemia. She returns to the office today accompanied by a friend for a 2 week follow up.     She was last seen on 2/3/20 in follow up to her orthostatic hypotension. Please refer to that office visit for a more complete HPI. She was instructed to stop amlodipine and split losartan in divided doses of 50 mg twice daily. Unfortunately, her blood pressures have remained elevated ranging from 140-160's systolic, accompanied by headache. However, her lightheadedness has completely resolved after stopping amlodipine. Blood pressure today was 142/74 sitting and 138/78 when standing. Of note, her home BP unit is consistently 15 points higher systolic when compared to office reading. She denies chest pain, shortness of breath, PND, orthopnea, presyncope, syncope, edema, heart racing, or palpitations. Upon exam, lungs are clear bilaterally, heart rate and rhythm regular, no abdominal distension or LE edema.     She does not engage in any routine exercise with the exception of an \"inversion table.\"  Follows a low sodium diet.   Eating and sleeping well   Compliant with all medications.                          Assessment and Plan:     1. Hypertension/Orthostatic hypotension - Symptomatic hypertension since stopping amlodipine. " No further recurrent orthostatic hypotension. Recommend switching losartan to irbesartan 150mg daily. Continue on metoprolol. Monitor BP daily with a goal < 130/90. Be aware patients home BP unit is ~ 15 points higher systolic than office reading. Encourage hydration, exercise, and heart healthy low sodium diet. Follow up BMP in 1 week     2.  Coronary artery disease -status post remote stent to LAD followed by single-vessel CABG with LIMA to the LAD 1999.  Recent coronary angiogram demonstrated no flow-limiting lesions, moderate in-stent restenosis in the ostial proximal LAD stent.  LIMA graft is occluded at its midportion likely due to the flow that is good to the native LAD.  No significant disease in the RCA or circumflex.  Encourage exercise and weight loss    3.  Hyperlipidemia -LDL at goal. Continue on statin               Thank you for allowing me to participate in this delightful patient's care.   Follow up in the clinic in 2 weeks for evaluation of symptoms.     SKYE Fernandez, CNP          Review of Systems:   Review of Systems:  Skin:  Positive for     Eyes:  Positive for glasses;glaucoma  ENT:  Positive for hearing loss  Respiratory:  Positive for shortness of breath;sleep apnea  Cardiovascular:    Positive for;palpitations;lightheadedness;fatigue  Gastroenterology: Negative    Genitourinary:  Negative    Musculoskeletal:  Positive for arthritis;neck pain  Neurologic:  Positive for headaches  Psychiatric:  Negative    Heme/Lymph/Imm:  Negative    Endocrine:  Positive for diabetes              Physical Exam:     GEN:  In general, this is a overweight female in no acute distress.  HEENT:  Pupils equal, round. Sclerae nonicteric. Clear oropharynx. Mucous membranes moist.  NECK: Supple, no masses appreciated. Trachea midline.  No JVD   C/V:  Regular rate and rhythm, systolic murmur, rub or gallop. No S3 or RV heave.   RESP: Respirations are unlabored. No use of accessory muscles. Clear to  auscultation bilaterally without wheezing, rales, or rhonchi.  GI: Abdomen soft, nontender, nondistended. No HSM appreciated.   EXTREM: No LE edema. No cyanosis or clubbing.  NEURO: Alert and oriented, cooperative. No obvious focal deficits.   PSYCH: Normal affect. Depressed  SKIN: Warm and dry. No rashes or petechiae appreciated.          Past Medical History:     Past Medical History:   Diagnosis Date     Anxiety      Arthritis      BPPV (benign paroxysmal positional vertigo)      CKD (chronic kidney disease)      Diabetes mellitus type 2 in nonobese (H)      GERD (gastroesophageal reflux disease)      Glaucoma (increased eye pressure)      HLD (hyperlipidemia)      Hypertension      IBS (irritable bowel syndrome)      Insomnia      GASPER (obstructive sleep apnea)               Past Surgical History:     Past Surgical History:   Procedure Laterality Date     CARDIAC SURGERY       CV ANGIOGRAM CORONARY GRAFT N/A 6/11/2019    Procedure: Angiogram Coronary Graft;  Surgeon: Hayden Muñiz MD;  Location:  [facility]     CV INSTANTANEOUS WAVE-FREE RATIO N/A 6/11/2019    Procedure: Instantaneous Wave-Free Ratio;  Surgeon: Hayden Muñiz MD;  Location: [facility]     CV LEFT HEART CATH N/A 6/11/2019    Procedure: Left Heart Cath;  Surgeon: Hayden Muñiz MD;  Location: [facility]     EYE SURGERY       ORTHOPEDIC SURGERY                Allergies:   Amoxicillin-pot clavulanate and Lisinopril       Data:   All laboratory data reviewed:    LAST BMP:  Lab Results   Component Value Date     06/10/2019      Lab Results   Component Value Date    POTASSIUM 5.0 06/12/2019     Lab Results   Component Value Date    CHLORIDE 108 06/10/2019     Lab Results   Component Value Date    DORIE 8.3 06/10/2019     Lab Results   Component Value Date    CO2 28 06/10/2019     Lab Results   Component Value Date    BUN 14 06/10/2019     Lab Results   Component Value Date    CR 0.88 06/10/2019      Lab Results   Component Value Date     06/10/2019       LAST CBC:  Lab Results   Component Value Date    WBC 8.3 06/10/2019     Lab Results   Component Value Date    RBC 4.27 06/10/2019     Lab Results   Component Value Date    HGB 12.9 06/10/2019     Lab Results   Component Value Date    HCT 41.4 06/10/2019     Lab Results   Component Value Date    MCV 97 06/10/2019     Lab Results   Component Value Date    MCH 30.2 06/10/2019     Lab Results   Component Value Date    MCHC 31.2 06/10/2019     Lab Results   Component Value Date    RDW 13.2 06/10/2019     Lab Results   Component Value Date     06/10/2019             Thank you for allowing me to participate in the care of your patient.    Sincerely,     SKYE Fernandez [facility]

## 2020-02-21 ENCOUNTER — TELEPHONE (OUTPATIENT)
Dept: CARDIOLOGY | Facility: CLINIC | Age: 75
End: 2020-02-21

## 2020-02-21 DIAGNOSIS — I95.1 ORTHOSTATIC HYPOTENSION: ICD-10-CM

## 2020-02-21 RX ORDER — IRBESARTAN 150 MG/1
300 TABLET ORAL AT BEDTIME
Qty: 1 TABLET | Refills: 0 | COMMUNITY
Start: 2020-02-21 | End: 2020-03-24

## 2020-02-21 NOTE — TELEPHONE ENCOUNTER
"Patient called RN today to advise that since Monday after starting her irbesartan 150mg she has been having BP's on her home cuff as high as 200/100 (185/85 when account for point difference with home cuff). Patient reports she has been getting readings consistently like this for the past few days. RN confirmed with patient she is also taking metoprolol 12.5mg BID. Patient denies any headaches, chest pain/pressure or SOB. Patient reported to this RN that she is \"out and about\" right now and feels \"just fine.\" Patient unable to provide current BP or HR for RN as she is not home. RN will send information to [employer] [place] for review.           2/17/20 [employer] [place] Wilson  1. Hypertension/Orthostatic hypotension - Symptomatic hypertension since stopping amlodipine. No further recurrent orthostatic hypotension. Recommend switching losartan to irbesartan 150mg daily. Continue on metoprolol. Monitor BP daily with a goal < 130/90. Be aware patients home BP unit is ~ 15 points higher systolic than office reading. Encourage hydration, exercise, and heart healthy low sodium diet. Follow up BMP in 1 week      2.  Coronary artery disease -status post remote stent to LAD followed by single-vessel CABG with LIMA to the LAD 1999.  Recent coronary angiogram demonstrated no flow-limiting lesions, moderate in-stent restenosis in the ostial proximal LAD stent.  LIMA graft is occluded at its midportion likely due to the flow that is good to the native LAD.  No significant disease in the RCA or circumflex.  Encourage exercise and weight loss     3.  Hyperlipidemia -LDL at goal. Continue on statin  "

## 2020-02-21 NOTE — TELEPHONE ENCOUNTER
RN called patient with [employer] [place]'s recommendations. Patient verbalized understanding and is in agreement with plan. RN updated patient's med list to reflect changes. Patient scheduled for BMP draw on 2/28/20. Patient will call with any further questions/concerns prior to BMP check.

## 2020-02-25 NOTE — TELEPHONE ENCOUNTER
Received call from pt stating that he BP is running high, states it is running 200/90, states she got a similar reading on a neighbors BP cuff, but states her cuff does run about 15 points high. Pt states she last took her BP at 2 AM which was 201/90. Pt states she takes her irbesartan at about 3 AM & her metoprolol at noon when she gets up and again between 2-3 AM. Pt advised to try to take it more 12 hours apart such as 12 AM & 12 PM. Pt advised to keep a log of her BP readings, times taken & the times she takes her meds. Pt advised to bring BP log & BP cuff to her apt with India Wilson NP on Tuesday 3/3/20. Pt verbalized understanding. Jacinto LI

## 2020-02-28 ENCOUNTER — CARE COORDINATION (OUTPATIENT)
Dept: CARDIOLOGY | Facility: CLINIC | Age: 75
End: 2020-02-28

## 2020-02-28 ENCOUNTER — ALLIED HEALTH/NURSE VISIT (OUTPATIENT)
Dept: CARDIOLOGY | Facility: CLINIC | Age: 75
End: 2020-02-28
Payer: COMMERCIAL

## 2020-02-28 VITALS — DIASTOLIC BLOOD PRESSURE: 84 MMHG | SYSTOLIC BLOOD PRESSURE: 156 MMHG | HEART RATE: 76 BPM

## 2020-02-28 DIAGNOSIS — I10 ESSENTIAL HYPERTENSION: ICD-10-CM

## 2020-02-28 DIAGNOSIS — I10 BENIGN ESSENTIAL HYPERTENSION: Primary | ICD-10-CM

## 2020-02-28 LAB
ANION GAP SERPL CALCULATED.3IONS-SCNC: 5 MMOL/L (ref 3–14)
BUN SERPL-MCNC: 16 MG/DL (ref 7–30)
CALCIUM SERPL-MCNC: 9 MG/DL (ref 8.5–10.1)
CHLORIDE SERPL-SCNC: 107 MMOL/L (ref 94–109)
CO2 SERPL-SCNC: 28 MMOL/L (ref 20–32)
CREAT SERPL-MCNC: 1.05 MG/DL (ref 0.52–1.04)
GFR SERPL CREATININE-BSD FRML MDRD: 53 ML/MIN/{1.73_M2}
GLUCOSE SERPL-MCNC: 244 MG/DL (ref 70–99)
POTASSIUM SERPL-SCNC: 3.6 MMOL/L (ref 3.4–5.3)
SODIUM SERPL-SCNC: 140 MMOL/L (ref 133–144)

## 2020-02-28 PROCEDURE — 80048 BASIC METABOLIC PNL TOTAL CA: CPT | Performed by: NURSE PRACTITIONER

## 2020-02-28 PROCEDURE — 99207 ZZC NO CHARGE NURSE ONLY: CPT

## 2020-02-28 PROCEDURE — 36415 COLL VENOUS BLD VENIPUNCTURE: CPT | Performed by: NURSE PRACTITIONER

## 2020-02-28 NOTE — PROGRESS NOTES
Pt was a walk in BP visit today who had concerns about recent BP readings post lab draw.     Last OV 2/17/2020 with India Wilson CNP for 2 week follow-up after 24Hr BP monitor in pt with hx of orthostatic hypotension, CAD s/p CABG (over 20yrs ago), HTN, DM2, anxiety, GERD, hiatal hernia, and HLD. At prior OV on 2/3/2020  BP monitor results avg /66. Pt to stop amlodipine and change losartan from 100mg daily to Losartan 50mg BID( from 2/3/20 OV). Pr returned 2/17/20 and pt having symptomatic hypertension since discontinue of amlodipine. Pt to stop losartan and Start Irbesartan 150mg daily.     Pt in clinic today for BMP check per India Wilson CNP. Per the pr she has been having excessively high BP readings at home and is very concerned. Pt reports 's - 200/ 90's - 100's. Pt requested Nurse check vitals and pt has home BP cuff in hand.     Today manual BP @ 1131 (left arm, adult reg cuff) 156/84, HR 76. Pt states that she has some intermittent pain located at the sternum but states this pain is different from that she feels from her indigestion and hiatal hernia. Pt states she is not dizzy or lightheaded. Home BP cuff in clinic read 132/76. Pt reports that she normally takes all her BP readings first thing in the morning prior to taking her morning medication. Pt verified she took her irbesartan 150mg and metoprolol 25mg today around 9:00AM. Pt states she would like to start taking her amlodipine again. Pt advised to not make any changes at this time as she has a follow-up with [place] on 3/3/2020.     Pt advised this information would be reviewed with her primary care team and she would be contacted with any recommendations.     Follow up scheduled for 3/3/2020 at 1:10 PM [facility] India Wilson CNP    Routing to: India Wilson CNP  Primary MD:Dr. Waggoner   In Clinic MD: Dr. Yannick Canas RN, BSN.

## 2020-02-28 NOTE — PROGRESS NOTES
Pt in clinic today for follow-up BP check today per India Dior CNP. Please see care coordinator note for further details.   Ordering provider: India Wilson CNP  Primary provider:Dr. Waggoner  In clinic provider:Dr. Yannick Canas RN, BSN

## 2020-02-28 NOTE — TELEPHONE ENCOUNTER
Patient was instructed to increase irbesartan to 300mg daily. Do we known if this was done?  It's documented 150mg.

## 2020-03-02 NOTE — PROGRESS NOTES
Pt contacted this Am to review med list and verify irbesartan dose. Message left for pt to call back. Pt scheduled to see India Wilson 3/3/2020. Will updatre once pt cals back to verify irbesartan dose.

## 2020-03-03 ENCOUNTER — OFFICE VISIT (OUTPATIENT)
Dept: CARDIOLOGY | Facility: CLINIC | Age: 75
End: 2020-03-03
Payer: COMMERCIAL

## 2020-03-03 VITALS
WEIGHT: 178 LBS | BODY MASS INDEX: 29.62 KG/M2 | HEART RATE: 86 BPM | DIASTOLIC BLOOD PRESSURE: 82 MMHG | SYSTOLIC BLOOD PRESSURE: 142 MMHG

## 2020-03-03 DIAGNOSIS — I10 BENIGN ESSENTIAL HYPERTENSION: Primary | ICD-10-CM

## 2020-03-03 PROCEDURE — 99214 OFFICE O/P EST MOD 30 MIN: CPT | Performed by: NURSE PRACTITIONER

## 2020-03-03 RX ORDER — HYDROCHLOROTHIAZIDE 25 MG/1
25 TABLET ORAL DAILY
Qty: 30 TABLET | Refills: 1 | Status: SHIPPED | OUTPATIENT
Start: 2020-03-03 | End: 2020-04-28

## 2020-03-03 RX ORDER — METOPROLOL SUCCINATE 25 MG/1
25 TABLET, EXTENDED RELEASE ORAL DAILY
Qty: 30 TABLET | Refills: 3 | Status: SHIPPED | OUTPATIENT
Start: 2020-03-03 | End: 2020-06-25

## 2020-03-03 NOTE — PROGRESS NOTES
Pt contacted and she verified she is taking the 300mg. Pt scheduled for follow-up OV with India Wilson CNP 3/3/2020 at 1:10PM.   TUNG Olson RN, BSN.   03/03/20 10:10 AM

## 2020-03-03 NOTE — PATIENT INSTRUCTIONS
Thanks for coming into [facility] today.    Home pressures remain elevated ranging from 160-200 systolic  Add hydrochlorothiazide 25mg daily (am)  Will switch metoprolol to long acting Toprol 25mg daily (am)  Renal Ultrasound to rule out renal stenosis.   Continue on all other medications.   Follow up BMP in 1 week w/ nurse BP check      Please call my nurse at [phone] with any questions or concerns.    Scheduling phone number: [phone]  Reminder: Please bring in all current medications, over the counter supplements and vitamin bottles to your next appointment.

## 2020-03-03 NOTE — LETTER
3/3/2020    [place] [address] [place] [address]    RE: Princess Edgar       Dear Colleague,    I had the pleasure of seeing Princess Edgar in the [facility].    Cardiology Clinic Progress Note  Princess Edgar MRN# [id number]   YOB: 1945 Age: 75 year old     Reason For Visit:  Blood pressure follow up   Primary Cardiologist:   Dr. Waggoner          History of Presenting Illness:    Princess Edgar is a pleasant 75 year old patient who carries a past medical history significant for artery disease status post CABG greater than 20 years ago, hypertension, orthostatic hypotension ( resolved after stopping amlodipine), diabetes, depression, anxiety, GERD, and hyperlipidemia.     She was last seen on 2/17/20 for further management of hypertension.  Blood pressures were noted to be mildly elevated in clinic. However, home pressures were consistently running in the 160-170's systolic. She did bring in her home BP unit which was found to be consistently 15 points higher systolic when compared to office reading. Losartan was switched to irbesartan 150mg daily and later uptitrated to 300mg after patient reported persist elevated pressure accompanied by headache. She returns to the office today accompanied by a friend for blood pressure follow up.     She presents today with complaints of headache. She denies chest pain, shortness of breath, PND, orthopnea, presyncope, lightheadedness, orthostatic hypotension, syncope, edema, heart racing, or palpitations. Blood pressure today was 136/84 with repeat reading mildly elevated to 142/82. However, home blood pressure log demonstrates consist readings in the 160-200's systolic accompanied by persistent headache.     Activity level is unchanged  Diet is strictly low sodium   Compliant with all medications.                      Assessment and Plan:     1. Hypertension - Blood pressures stable in  clinic. No recurrence of orthostatic hypotension. Home pressures uncontrolled ( known 15 point difference in home unit). Add hydrochlorothiazide 25mg daily to current medical regimen. Will also switch metoprolol to long acting Toprol, take in am.  Follow up BMP in 1 week w/ nurse BP check.  Monitor BP daily with a goal of < 130/90. Recommend renal US to rule out renal stenosis.              Thank you for allowing me to participate in this delightful patient's care.       India Wilson, APRN, CNP          Review of Systems:   Review of Systems:  Skin:  Positive for bruising   Eyes:  Positive for visual blurring  ENT:  Positive for hearing loss  Respiratory:  Negative for    Cardiovascular:    fatigue;lightheadedness;Positive for  Gastroenterology: Positive for heartburn  Genitourinary:  not assessed    Musculoskeletal:  Positive for    Neurologic:  Positive for headaches;numbness or tingling of hands;numbness or tingling of feet  Psychiatric:  Positive for sleep disturbances  Heme/Lymph/Imm:  Positive for allergies  Endocrine:  Positive for diabetes              Physical Exam:     GEN:  In general, this is a overweight female in no acute distress.  HEENT:  Pupils equal, round. Sclerae nonicteric. Clear oropharynx. Mucous membranes moist.  NECK: Supple, no masses appreciated. Trachea midline.  No JVD   C/V:  Regular rate and rhythm, systolic murmur, rub or gallop. No S3 or RV heave.   RESP: Respirations are unlabored. No use of accessory muscles. Clear to auscultation bilaterally without wheezing, rales, or rhonchi.  GI: Abdomen soft, nontender, nondistended. No HSM appreciated.   EXTREM: No LE edema. No cyanosis or clubbing.  NEURO: Alert and oriented, cooperative. No obvious focal deficits. Headache  PSYCH: Normal affect. Depressed  SKIN: Warm and dry. No rashes or petechiae appreciated.          Past Medical History:     Past Medical History:   Diagnosis Date     Anxiety      Arthritis      BPPV (benign  paroxysmal positional vertigo)      Chronic insomnia 11/29/2016    ativan at bedtime since knee surgery in [id number] - discussed not rec long term - dependency risk.  Trial of trazodone 8/2016 caused paradoxical effect and jittery, however it tolerating well 100 mg 1/2018 -6/18/2018 and effective.     Chronic seasonal allergic rhinitis due to fungal spores 9/17/2015    allegra-D 12 hour, OTC flonase     CKD (chronic kidney disease) stage 3, GFR 30-59 ml/min (H) 6/23/2016    losartan     Controlled type 2 diabetes mellitus with stage 3 chronic kidney disease, without long-term current use of insulin (H) 9/11/2015    Prior Metformin XR hypoglycemia, XR stomach upset - tolerating Metformin   mg BID fall 2015 -> c/o GI so only taking 1/2 pill in the evening  as of 4/2016     Coronary artery disease involving native coronary artery of native heart without angina pectoris 9/17/2015    S/p CABG 1999, on ASA, Bblocker, statin, ARB. 6/2019 6/2019 Cor angio for unstable angina 1. Chronically occluded LIMA to LAD and moderate ostial/proximal LAD disease (non-flow limiting) 2. Moderately elevated left heart filling pressure (LVEDP 22 mmHg)     GERD (gastroesophageal reflux disease)      HLD (hyperlipidemia)      Hypertension      IBS (irritable bowel syndrome)      Obstructive sleep apnea 11/30/2015 11/29/2016 reported never got a CPAP machine.  Setting: APAP 5-12 Supplied by: [facility] PSG done: 11/10/2015 AHI 5 RDI 8 Lowest O2 Sat: 80% New 11/25/2015              Past Surgical History:     Past Surgical History:   Procedure Laterality Date     CARDIAC SURGERY       CV ANGIOGRAM CORONARY GRAFT N/A 6/11/2019    Procedure: Angiogram Coronary Graft;  Surgeon: Hayden Muñiz MD;  Location:  HEART CARDIAC CATH LAB     CV INSTANTANEOUS WAVE-FREE RATIO N/A 6/11/2019    Procedure: Instantaneous Wave-Free Ratio;  Surgeon: Hayden Muñiz MD;  Location:  HEART CARDIAC CATH LAB     CV LEFT HEART CATH N/A 6/11/2019     Procedure: Left Heart Cath;  Surgeon: Hayden Muñiz MD;  Location: [facility] LAB     EYE SURGERY       ORTHOPEDIC SURGERY                Allergies:   Amoxicillin-pot clavulanate and Lisinopril       Data:   All laboratory data reviewed:    LAST BMP:  Lab Results   Component Value Date     06/10/2019      Lab Results   Component Value Date    POTASSIUM 5.0 06/12/2019     Lab Results   Component Value Date    CHLORIDE 108 06/10/2019     Lab Results   Component Value Date    DORIE 8.3 06/10/2019     Lab Results   Component Value Date    CO2 28 06/10/2019     Lab Results   Component Value Date    BUN 14 06/10/2019     Lab Results   Component Value Date    CR 0.88 06/10/2019     Lab Results   Component Value Date     06/10/2019       LAST CBC:  Lab Results   Component Value Date    WBC 8.3 06/10/2019     Lab Results   Component Value Date    RBC 4.27 06/10/2019     Lab Results   Component Value Date    HGB 12.9 06/10/2019     Lab Results   Component Value Date    HCT 41.4 06/10/2019     Lab Results   Component Value Date    MCV 97 06/10/2019     Lab Results   Component Value Date    MCH 30.2 06/10/2019     Lab Results   Component Value Date    MCHC 31.2 06/10/2019     Lab Results   Component Value Date    RDW 13.2 06/10/2019     Lab Results   Component Value Date     06/10/2019         Thank you for allowing me to participate in the care of your patient.    Sincerely,     SKYE Fernandez [facility]

## 2020-03-04 NOTE — PROGRESS NOTES
Cardiology Clinic Progress Note  Princess Edgar MRN# [id number]   YOB: 1945 Age: 75 year old     Reason For Visit:  Blood pressure follow up   Primary Cardiologist:   Dr. Waggoner          History of Presenting Illness:    Princess Edgar is a pleasant 75 year old patient who carries a past medical history significant for artery disease status post CABG greater than 20 years ago, hypertension, orthostatic hypotension ( resolved after stopping amlodipine), diabetes, depression, anxiety, GERD, and hyperlipidemia.     She was last seen on 2/17/20 for further management of hypertension.  Blood pressures were noted to be mildly elevated in clinic. However, home pressures were consistently running in the 160-170's systolic. She did bring in her home BP unit which was found to be consistently 15 points higher systolic when compared to office reading. Losartan was switched to irbesartan 150mg daily and later uptitrated to 300mg after patient reported persist elevated pressure accompanied by headache. She returns to the office today accompanied by a friend for blood pressure follow up.     She presents today with complaints of headache. She denies chest pain, shortness of breath, PND, orthopnea, presyncope, lightheadedness, orthostatic hypotension, syncope, edema, heart racing, or palpitations. Blood pressure today was 136/84 with repeat reading mildly elevated to 142/82. However, home blood pressure log demonstrates consist readings in the 160-200's systolic accompanied by persistent headache.     Activity level is unchanged  Diet is strictly low sodium   Compliant with all medications.                      Assessment and Plan:     1. Hypertension - Blood pressures stable in clinic. No recurrence of orthostatic hypotension. Home pressures uncontrolled ( known 15 point difference in home unit). Add hydrochlorothiazide 25mg daily to current medical regimen. Will also switch metoprolol to long acting  Toprol, take in am.  Follow up BMP in 1 week w/ nurse BP check.  Monitor BP daily with a goal of < 130/90. Recommend renal US to rule out renal stenosis.              Thank you for allowing me to participate in this delightful patient's care.       SKYE Fernandez, CNP          Review of Systems:   Review of Systems:  Skin:  Positive for bruising   Eyes:  Positive for visual blurring  ENT:  Positive for hearing loss  Respiratory:  Negative for    Cardiovascular:    fatigue;lightheadedness;Positive for  Gastroenterology: Positive for heartburn  Genitourinary:  not assessed    Musculoskeletal:  Positive for    Neurologic:  Positive for headaches;numbness or tingling of hands;numbness or tingling of feet  Psychiatric:  Positive for sleep disturbances  Heme/Lymph/Imm:  Positive for allergies  Endocrine:  Positive for diabetes              Physical Exam:     GEN:  In general, this is a overweight female in no acute distress.  HEENT:  Pupils equal, round. Sclerae nonicteric. Clear oropharynx. Mucous membranes moist.  NECK: Supple, no masses appreciated. Trachea midline.  No JVD   C/V:  Regular rate and rhythm, systolic murmur, rub or gallop. No S3 or RV heave.   RESP: Respirations are unlabored. No use of accessory muscles. Clear to auscultation bilaterally without wheezing, rales, or rhonchi.  GI: Abdomen soft, nontender, nondistended. No HSM appreciated.   EXTREM: No LE edema. No cyanosis or clubbing.  NEURO: Alert and oriented, cooperative. No obvious focal deficits. Headache  PSYCH: Normal affect. Depressed  SKIN: Warm and dry. No rashes or petechiae appreciated.          Past Medical History:     Past Medical History:   Diagnosis Date     Anxiety      Arthritis      BPPV (benign paroxysmal positional vertigo)      Chronic insomnia 11/29/2016    ativan at bedtime since knee surgery in [id number] - discussed not rec long term - dependency risk.  Trial of trazodone 8/2016 caused paradoxical effect and jittery,  however it tolerating well 100 mg 1/2018 -6/18/2018 and effective.     Chronic seasonal allergic rhinitis due to fungal spores 9/17/2015    allegra-D 12 hour, OTC flonase     CKD (chronic kidney disease) stage 3, GFR 30-59 ml/min (H) 6/23/2016    losartan     Controlled type 2 diabetes mellitus with stage 3 chronic kidney disease, without long-term current use of insulin (H) 9/11/2015    Prior Metformin XR hypoglycemia, XR stomach upset - tolerating Metformin   mg BID fall 2015 -> c/o GI so only taking 1/2 pill in the evening  as of 4/2016     Coronary artery disease involving native coronary artery of native heart without angina pectoris 9/17/2015    S/p CABG 1999, on ASA, Bblocker, statin, ARB. 6/2019 6/2019 Cor angio for unstable angina 1. Chronically occluded LIMA to LAD and moderate ostial/proximal LAD disease (non-flow limiting) 2. Moderately elevated left heart filling pressure (LVEDP 22 mmHg)     GERD (gastroesophageal reflux disease)      HLD (hyperlipidemia)      Hypertension      IBS (irritable bowel syndrome)      Obstructive sleep apnea 11/30/2015 11/29/2016 reported never got a CPAP machine.  Setting: APAP 5-12 Supplied by: [facility] PSG done: 11/10/2015 AHI 5 RDI 8 Lowest O2 Sat: 80% New 11/25/2015              Past Surgical History:     Past Surgical History:   Procedure Laterality Date     CARDIAC SURGERY       CV ANGIOGRAM CORONARY GRAFT N/A 6/11/2019    Procedure: Angiogram Coronary Graft;  Surgeon: Hayden Muñiz MD;  Location:  HEART CARDIAC CATH LAB     CV INSTANTANEOUS WAVE-FREE RATIO N/A 6/11/2019    Procedure: Instantaneous Wave-Free Ratio;  Surgeon: Hayden Muñiz MD;  Location:  HEART CARDIAC CATH LAB     CV LEFT HEART CATH N/A 6/11/2019    Procedure: Left Heart Cath;  Surgeon: Hayden Muñiz MD;  Location:  HEART CARDIAC CATH LAB     EYE SURGERY       ORTHOPEDIC SURGERY                Allergies:   Amoxicillin-pot clavulanate and Lisinopril       Data:   All laboratory  data reviewed:    LAST BMP:  Lab Results   Component Value Date     06/10/2019      Lab Results   Component Value Date    POTASSIUM 5.0 06/12/2019     Lab Results   Component Value Date    CHLORIDE 108 06/10/2019     Lab Results   Component Value Date    DORIE 8.3 06/10/2019     Lab Results   Component Value Date    CO2 28 06/10/2019     Lab Results   Component Value Date    BUN 14 06/10/2019     Lab Results   Component Value Date    CR 0.88 06/10/2019     Lab Results   Component Value Date     06/10/2019       LAST CBC:  Lab Results   Component Value Date    WBC 8.3 06/10/2019     Lab Results   Component Value Date    RBC 4.27 06/10/2019     Lab Results   Component Value Date    HGB 12.9 06/10/2019     Lab Results   Component Value Date    HCT 41.4 06/10/2019     Lab Results   Component Value Date    MCV 97 06/10/2019     Lab Results   Component Value Date    MCH 30.2 06/10/2019     Lab Results   Component Value Date    MCHC 31.2 06/10/2019     Lab Results   Component Value Date    RDW 13.2 06/10/2019     Lab Results   Component Value Date     06/10/2019

## 2020-03-10 ENCOUNTER — ALLIED HEALTH/NURSE VISIT (OUTPATIENT)
Dept: CARDIOLOGY | Facility: CLINIC | Age: 75
End: 2020-03-10
Attending: NURSE PRACTITIONER
Payer: COMMERCIAL

## 2020-03-10 ENCOUNTER — CARE COORDINATION (OUTPATIENT)
Dept: CARDIOLOGY | Facility: CLINIC | Age: 75
End: 2020-03-10

## 2020-03-10 VITALS — DIASTOLIC BLOOD PRESSURE: 62 MMHG | SYSTOLIC BLOOD PRESSURE: 112 MMHG | HEART RATE: 88 BPM

## 2020-03-10 DIAGNOSIS — I10 BENIGN ESSENTIAL HYPERTENSION: ICD-10-CM

## 2020-03-10 DIAGNOSIS — I10 BENIGN ESSENTIAL HYPERTENSION: Primary | ICD-10-CM

## 2020-03-10 DIAGNOSIS — I70.1 RENAL ARTERY STENOSIS (H): ICD-10-CM

## 2020-03-10 LAB
ANION GAP SERPL CALCULATED.3IONS-SCNC: 4 MMOL/L (ref 3–14)
BUN SERPL-MCNC: 33 MG/DL (ref 7–30)
CALCIUM SERPL-MCNC: 9.5 MG/DL (ref 8.5–10.1)
CHLORIDE SERPL-SCNC: 103 MMOL/L (ref 94–109)
CO2 SERPL-SCNC: 31 MMOL/L (ref 20–32)
CREAT SERPL-MCNC: 1.41 MG/DL (ref 0.52–1.04)
GFR SERPL CREATININE-BSD FRML MDRD: 37 ML/MIN/{1.73_M2}
GLUCOSE SERPL-MCNC: 166 MG/DL (ref 70–99)
POTASSIUM SERPL-SCNC: 4.1 MMOL/L (ref 3.4–5.3)
SODIUM SERPL-SCNC: 138 MMOL/L (ref 133–144)

## 2020-03-10 PROCEDURE — 80048 BASIC METABOLIC PNL TOTAL CA: CPT | Performed by: INTERNAL MEDICINE

## 2020-03-10 PROCEDURE — 99207 ZZC NO CHARGE LOS: CPT

## 2020-03-10 PROCEDURE — 36415 COLL VENOUS BLD VENIPUNCTURE: CPT | Performed by: INTERNAL MEDICINE

## 2020-03-10 NOTE — PROGRESS NOTES
Pt in clinic for nurse visit per India Wilson CNP.     Last OV 3/3/2020 for HTN management in pt with Hx of artery disease status post CABG greater than 20 years ago, hypertension, orthostatic hypotension ( resolved after stopping amlodipine), diabetes, depression, anxiety, GERD, and hyperlipidemia. Due to uncontrolled home BP pt to start hydrochlorothiazide 25mg daily. Pt to stop metoprolol tartrate and start metoprolol Succinate 25mg daily. BMP and RN visit X1 week. Pt to have US renal to assess renal arteries.     Today BP @ 1:32 PM (right arm adult reg cuff) 112/62, HR 88. Pt reports that yesterday and today she has had increased dizziness and lightheadedness. Pt reports she took her medications at 12:00 PM (noon) today prior to leaving for arrival to this visit. Pt could not clearly recite what medications or what doses of the medications she took this morning. Pt states that she has had some recent confusion over what meds to take and when. Pt also reports she now thinks she has only been taking 1 tablet of the Irbesartan rather than the prescribed 2 tablets.   RN offered to take pt to ER for evaluation due to reports of dizziness, pt refused.     BMP drawn after visit with results as follows:     Component      Latest Ref Rng & Units 2/28/2020 3/10/2020   Sodium      133 - 144 mmol/L 140 138   Potassium      3.4 - 5.3 mmol/L 3.6 4.1   Chloride      94 - 109 mmol/L 107 103   Carbon Dioxide      20 - 32 mmol/L 28 31   Anion Gap      3 - 14 mmol/L 5 4   Glucose      70 - 99 mg/dL 244 (H) 166 (H)   Urea Nitrogen      7 - 30 mg/dL 16 33 (H)   Creatinine      0.52 - 1.04 mg/dL 1.05 (H) 1.41 (H)   GFR Estimate      >60 mL/min/1.73:m2 53 (L) 37 (L)   GFR Estimate If Black      >60 mL/min/1.73:m2 61 43 (L)   Calcium      8.5 - 10.1 mg/dL 9.0 9.5         Pt advised this information would be sent to her primary care team for review and recommendations.     Follow up ordered for 3/2020 - does not have scheduled OV at  this time.     Routing to:India Wilson CNP and team  Primary MD:Dr. Waggoner   In Clinic MD: Dr. Russell Canas RN, BSN.

## 2020-03-10 NOTE — PROGRESS NOTES
Pt in clinic today for follow-up BP check today per India Wilson CNP. Please see care coordinator note for further details.   Ordering provider: India Wilson CNP  Primary provider:Dr. Waggoner   In clinic provider:Dr. Russell Canas RN, BSN

## 2020-03-17 ENCOUNTER — HOSPITAL ENCOUNTER (OUTPATIENT)
Dept: CARDIOLOGY | Facility: CLINIC | Age: 75
Discharge: HOME | End: 2020-03-17
Attending: NURSE PRACTITIONER | Admitting: NURSE PRACTITIONER
Payer: COMMERCIAL

## 2020-03-17 ENCOUNTER — CARE COORDINATION (OUTPATIENT)
Dept: CARDIOLOGY | Facility: CLINIC | Age: 75
End: 2020-03-17

## 2020-03-17 DIAGNOSIS — I10 BENIGN ESSENTIAL HYPERTENSION: ICD-10-CM

## 2020-03-17 PROCEDURE — 93975 VASCULAR STUDY: CPT | Mod: 26 | Performed by: INTERNAL MEDICINE

## 2020-03-17 PROCEDURE — 76770 US EXAM ABDO BACK WALL COMP: CPT

## 2020-03-17 PROCEDURE — 76770 US EXAM ABDO BACK WALL COMP: CPT | Mod: 26 | Performed by: INTERNAL MEDICINE

## 2020-03-17 NOTE — PROGRESS NOTES
Received call from requesting if she can drink water.     Reviewed instruction w/ pt:   US RENAL COMP W DUP COMP  How do I prepare for my exam? (Food and drink instructions)  Adults: No eating, smoking, gum chewing or drinking for 8 hours before the exam. You may take medicine with a small sip of water.     Children:  * Infants, breast-fed: may have breast milk up to 2 hours before exam. Infants may drink water until time of exam.  * Infants, formula: may have bottle until 4 hours before exam. Infants may drink water until time of exam.  * Children 1-5 years: No food for 4 hours before exam. Children may drink water until time of exam.  * Children 6 -12 years: No food for 6 hours before exam. Children may drink water until time of exam.  * Children over 12 years: No food for 8 hours before exam. Children may drink water until time of exam.     * J Tube Fed: No need to stop feedings.     What should I wear: Wear comfortable clothes.     How long does the exam take: Most ultrasounds take 30 to 60 minutes.     What should I bring: Bring a list of your medicines, including vitamins, minerals and over-the-counter drugs. It is safest to leave personal items at home.     Do I need a :  No  is needed.     What do I need to tell my doctor: Tell your doctor about any allergies you may have.     What should I do after the exam: No restrictions, you may resume normal activities.     What is this test: An ultrasound uses sound waves to make pictures of the body. Sound waves do not cause pain. The only discomfort may be the pressure of the wand against your skin or full bladder.     Who should I call with questions: If you have any questions, please call the Imaging Department where you will have your exam. Directions, parking instructions, and other information are available on our website, [email or web address].  Jacinto LI

## 2020-03-20 NOTE — PROGRESS NOTES
RN called patient and reviewed with her DELORES India DE LEON and Dr. Waggoner's recommendations. Patient verbalized understanding and is in agreement with plan. RN will place orders for vascular f/u, but patient aware this may not occur for at least 8 weeks. Patient is aware that if her symptoms change prior to that time she should call us to discuss and we will reevaluate need for office visit/further testing at that time. Patient reports to RN she is feeling much better today.     Please have patient due a blood pressure log recording her daily pressures, what time and what mediations are taken followed by symptoms. Difficult to determine what is causing symptoms with inconsistent dosing of medications. Reviewed results of renal US, right renal artery shows stenosis. Reviewed with Dr. Waggoner who recommends patient follow up with vascular for evaluation.  Instruct patient to seek ER evaluation should she have severe symptoms. DELORES India DE LEON

## 2020-03-20 NOTE — TELEPHONE ENCOUNTER
Please have patient due a blood pressure log recording her daily pressures, what time and what mediations are taken followed by symptoms. Difficult to determine what is causing symptoms with inconsistent dosing of medications. Reviewed results of renal US, right renal artery shows stenosis. Reviewed with Dr. Waggoner who recommends patient follow up with vascular for evaluation.  Instruct patient to seek ER evaluation should she have severe symptoms.

## 2020-03-24 DIAGNOSIS — I95.1 ORTHOSTATIC HYPOTENSION: ICD-10-CM

## 2020-03-24 RX ORDER — IRBESARTAN 150 MG/1
300 TABLET ORAL AT BEDTIME
Qty: 180 TABLET | Refills: 0 | Status: SHIPPED | OUTPATIENT
Start: 2020-03-24 | End: 2020-08-03

## 2020-04-17 ENCOUNTER — CARE COORDINATION (OUTPATIENT)
Dept: CARDIOLOGY | Facility: CLINIC | Age: 75
End: 2020-04-17

## 2020-04-17 ENCOUNTER — HOSPITAL ENCOUNTER (EMERGENCY)
Facility: CLINIC | Age: 75
Discharge: HOME | End: 2020-04-17
Attending: EMERGENCY MEDICINE | Admitting: EMERGENCY MEDICINE
Payer: COMMERCIAL

## 2020-04-17 ENCOUNTER — APPOINTMENT (OUTPATIENT)
Dept: GENERAL RADIOLOGY | Facility: CLINIC | Age: 75
End: 2020-04-17
Attending: EMERGENCY MEDICINE
Payer: COMMERCIAL

## 2020-04-17 VITALS
WEIGHT: 165 LBS | RESPIRATION RATE: 18 BRPM | BODY MASS INDEX: 28.17 KG/M2 | HEIGHT: 64 IN | TEMPERATURE: 97.5 F | OXYGEN SATURATION: 97 % | DIASTOLIC BLOOD PRESSURE: 83 MMHG | HEART RATE: 81 BPM | SYSTOLIC BLOOD PRESSURE: 147 MMHG

## 2020-04-17 DIAGNOSIS — R07.89 ATYPICAL CHEST PAIN: ICD-10-CM

## 2020-04-17 LAB
ANION GAP SERPL CALCULATED.3IONS-SCNC: 3 MMOL/L (ref 3–14)
BASOPHILS # BLD AUTO: 0.1 10E9/L (ref 0–0.2)
BASOPHILS NFR BLD AUTO: 0.9 %
BUN SERPL-MCNC: 28 MG/DL (ref 7–30)
CALCIUM SERPL-MCNC: 9.3 MG/DL (ref 8.5–10.1)
CHLORIDE SERPL-SCNC: 104 MMOL/L (ref 94–109)
CO2 SERPL-SCNC: 30 MMOL/L (ref 20–32)
CREAT SERPL-MCNC: 1.24 MG/DL (ref 0.52–1.04)
DIFFERENTIAL METHOD BLD: ABNORMAL
EOSINOPHIL # BLD AUTO: 0.6 10E9/L (ref 0–0.7)
EOSINOPHIL NFR BLD AUTO: 6.9 %
ERYTHROCYTE [DISTWIDTH] IN BLOOD BY AUTOMATED COUNT: 13 % (ref 10–15)
GFR SERPL CREATININE-BSD FRML MDRD: 43 ML/MIN/{1.73_M2}
GLUCOSE SERPL-MCNC: 188 MG/DL (ref 70–99)
HCT VFR BLD AUTO: 46.4 % (ref 35–47)
HGB BLD-MCNC: 14.3 G/DL (ref 11.7–15.7)
IMM GRANULOCYTES # BLD: 0 10E9/L (ref 0–0.4)
IMM GRANULOCYTES NFR BLD: 0.3 %
LYMPHOCYTES # BLD AUTO: 1.9 10E9/L (ref 0.8–5.3)
LYMPHOCYTES NFR BLD AUTO: 23.6 %
MCH RBC QN AUTO: 30.2 PG (ref 26.5–33)
MCHC RBC AUTO-ENTMCNC: 30.8 G/DL (ref 31.5–36.5)
MCV RBC AUTO: 98 FL (ref 78–100)
MONOCYTES # BLD AUTO: 0.7 10E9/L (ref 0–1.3)
MONOCYTES NFR BLD AUTO: 8.8 %
NEUTROPHILS # BLD AUTO: 4.7 10E9/L (ref 1.6–8.3)
NEUTROPHILS NFR BLD AUTO: 59.5 %
NRBC # BLD AUTO: 0 10*3/UL
NRBC BLD AUTO-RTO: 0 /100
PLATELET # BLD AUTO: 316 10E9/L (ref 150–450)
POTASSIUM SERPL-SCNC: 3.9 MMOL/L (ref 3.4–5.3)
RBC # BLD AUTO: 4.73 10E12/L (ref 3.8–5.2)
SODIUM SERPL-SCNC: 137 MMOL/L (ref 133–144)
TROPONIN I SERPL-MCNC: <0.015 UG/L (ref 0–0.04)
TROPONIN I SERPL-MCNC: <0.015 UG/L (ref 0–0.04)
WBC # BLD AUTO: 7.9 10E9/L (ref 4–11)

## 2020-04-17 PROCEDURE — 85025 COMPLETE CBC W/AUTO DIFF WBC: CPT | Performed by: EMERGENCY MEDICINE

## 2020-04-17 PROCEDURE — 93005 ELECTROCARDIOGRAM TRACING: CPT

## 2020-04-17 PROCEDURE — 84484 ASSAY OF TROPONIN QUANT: CPT | Performed by: EMERGENCY MEDICINE

## 2020-04-17 PROCEDURE — 80048 BASIC METABOLIC PNL TOTAL CA: CPT | Performed by: EMERGENCY MEDICINE

## 2020-04-17 PROCEDURE — 71045 X-RAY EXAM CHEST 1 VIEW: CPT

## 2020-04-17 PROCEDURE — 99285 EMERGENCY DEPT VISIT HI MDM: CPT | Mod: 25

## 2020-04-17 ASSESSMENT — MIFFLIN-ST. JEOR: SCORE: 1238.44

## 2020-04-17 NOTE — ED PROVIDER NOTES
"  History     Chief Complaint:  Chest Pain      HPI   Princess Edgar is a 75 year old female, with a history of CKD, Diabetes type 2 mellitus, hypertension, and hyperlipidemia, who presents with chest pain. Patient states that she has had 2 episodes of chest pain. One episode occurred yesterday morning and lasted for at least an hour. The other episode occurred today and was a similar duration. The patient also notes she has not been taking her blood pressure medications for the past 3 days due to low blood pressures.  Patient also reports dizziness which she reports is somewhat chronic.  She discusses wanting to go to [facility] to try and \"figure out what is wrong with me\" due to these many symptoms.  She states she had similar symptoms back last year for which she was admitted to the hospital but nothing was found.    Allergies:  Amoxicillin-Pot Clavulanate  Lisinopril    Medications:    Lipitor  Pepcid  Albuterol  Avapro  Lorazepam  Metformin  Toprol  Desyrel  Effexor  Allegra  Nystatin  Lopressor  Cozaar    Past Medical History:    Anxiety  Arthritis  BPPV  Chronic insomnia  Chronic seasonal allergic rhinitis  CKD  Diabetes type 2 mellitus  CAD  GERD  Hyperlipidemia  Hypertension  IBS  obstructive sleep apnea    Past Surgical History:    Cardiac surgery  Angiogram coronary graft  Left heart cath  Eye surgery  Orthopedic surgery  Iridotomy/Iridectomy  Knee arthroplasty, bilateral  Dental surgery  Cataract removal x2    Family History:    Mother: Arthritis  Father: CAD, Cancer    Social History:  Former Smoker  Occasional alcohol use  Denies drug use  Marital Status:  Single [1]    Review of Systems   Constitutional: Negative for fever.   HENT: Negative for congestion.    Cardiovascular: Positive for chest pain. Negative for leg swelling.   Gastrointestinal: Negative for abdominal pain, nausea and vomiting.   Neurological: Positive for dizziness.   All other systems reviewed and are negative.      Physical Exam " "  First Vitals:  BP: 126/71  Pulse: 96  Temp: 97.5  F (36.4  C)  Resp: 18  Height: 162.6 cm (5' 4\")  Weight: 74.8 kg (165 lb)  SpO2: 98 %      Physical Exam  Nursing note and vitals reviewed.  Constitutional: Cooperative.   HENT:   Mouth/Throat: Moist mucous membranes.   Eyes: EOMI, nonicteric sclera  Cardiovascular: Normal rate, regular rhythm, no murmurs, rubs, or gallops  Pulmonary/Chest: Effort normal and breath sounds normal. No respiratory distress. No wheezes. No rales.   Abdominal: Soft. Nontender, nondistended, no guarding or rigidity.   Musculoskeletal: Normal range of motion.   Neurological: Alert. Moves all extremities spontaneously.   Skin: Skin is warm and dry. No rash noted.   Psychiatric: Normal mood and affect.       Emergency Department Course     Imaging:  XR Chest 1 view, portable:   IMPRESSION: No acute process. Prior sternotomy and CABG procedure. Normal heart size. Mild elevation of the right hemidiaphragm, similar to comparison exam. No pleural effusion or pneumothorax.  as per radiology.    Laboratory:  CBC: WBC: 7.9, HGB: 14.3, PLT: 316    BMP: Glucose 188(H), GFR: 43(L), o/w WNL (Creatinine: 1.24)    1845 Troponin: <0.015    2031 Troponin: <0.015      Emergency Department Course:  Nursing notes and vitals reviewed. (1800) I performed an exam of the patient as documented above.     IV inserted. Blood drawn. This was sent to the lab for further testing, results above.     The patient was sent for a XR Chest 1 view, portable while in the emergency department, findings above.     2025 I rechecked the patient and discussed the results of her workup thus far.     Findings and plan explained to the Patient. Patient discharged home with instructions regarding supportive care, medications, and reasons to return. The importance of close follow-up was reviewed.     I personally reviewed the laboratory results with the Patient and answered all related questions prior to discharge.     Impression & " Plan      Medical Decision Making:  Covid-19  Princess Edgar was evaluated during a global COVID-19 pandemic, which necessitated consideration that the patient might be at risk for infection with the SARS-CoV-2 virus that causes COVID-19.   Applicable protocols for evaluation were followed during the patient's care.    Patient presents from home with chief complaint chest pain and shakiness.  She reports similar symptoms in the past for which medical evaluation has not found cause.  She is currently symptom-free.  EKG is nonischemic and unchanged compared to prior.  She denies cough or URI symptoms.  Troponin x2 is negative.  Chest x-ray shows no change.  Given patient's age and history of coronary artery disease, she is certainly at increased risk of ACS, but no signs are found on today's evaluation.  She is moderate risk by HEART score which I discussed with her at bedside including the 12 to 16% risk of MACE.  Observation admission was therefore offered which patient declines stating she would prefer to go home.  I do note that in November she had very similar symptoms and had negative stress test which is reassuring.  Given patient is desiring of discharge home, she was therefore discharged in stable condition.  All questions answered.  I did ask her to contact her primary care physician or cardiologist on Monday/Tuesday to discuss ED visit and arrange follow-up, virtual visit if necessary.  We also discussed reasons to return to the emergency department.      Diagnosis:    ICD-10-CM    1. Atypical chest pain  R07.89      Disposition:  discharged to home    Scribe Disclosure:  I, Lexx Perez, am serving as a scribe on 4/17/2020 at 6:00 PM to personally document services performed by Samuel Alegria MD based on my observations and the provider's statements to me.     Lexx Perez  4/17/2020   [facility] EMERGENCY DEPARTMENT       Samuel Alegria MD  04/18/20 [id number]

## 2020-04-17 NOTE — ED TRIAGE NOTES
Patient comes in for evaluation of 2 episodes of chest pain one yesterday morning which lasted for at least an hour, one today lasting the same. The patient also notes she has not been taking her BP meds for 3 days due to low blood pressures. ABCs intact.

## 2020-04-17 NOTE — PROGRESS NOTES
Received call from pt stating that she has had a hard time with her BP lately, states she her BP was so low last night it would not register on her monitor, but latest BP was 169/86. She states her pulse was very fast last night feeling it might pound out her chest, but pulse is now in the 80's. She c/o feeling very weak, dizzy & shaky whenever she gets up, also c/o her hand being purple. Pt continues to c/o chest tightness, weakness when being up. Pt advised to go to the ED & advised not to drive herself. She states she will call her daughter to give her a ride. Called [facility] ED with an update. Jacinto LI

## 2020-04-17 NOTE — ED AVS SNAPSHOT
[facility] Emergency Department  [address]  [facility] [address]  Phone:  [phone]  Fax:  [phone]                                    Princess Edgar   MRN: [id number]    Department:  [facility] Emergency Department   Date of Visit:  4/17/2020           After Visit Summary Signature Page    I have received my discharge instructions, and my questions have been answered. I have discussed any challenges I see with this plan with the nurse or doctor.    ..........................................................................................................................................  Patient/Patient Representative Signature      ..........................................................................................................................................  Patient Representative Print Name and Relationship to Patient    ..................................................               ................................................  Date                                   Time    ..........................................................................................................................................  Reviewed by Signature/Title    ...................................................              ..............................................  Date                                               Time          22EPIC Rev 08/18

## 2020-04-18 ASSESSMENT — ENCOUNTER SYMPTOMS
ABDOMINAL PAIN: 0
DIZZINESS: 1
VOMITING: 0
FEVER: 0
NAUSEA: 0

## 2020-04-19 LAB — INTERPRETATION ECG - MUSE: NORMAL

## 2020-04-28 DIAGNOSIS — I10 BENIGN ESSENTIAL HYPERTENSION: ICD-10-CM

## 2020-04-28 RX ORDER — HYDROCHLOROTHIAZIDE 25 MG/1
25 TABLET ORAL DAILY
Qty: 90 TABLET | Refills: 1 | Status: SHIPPED | OUTPATIENT
Start: 2020-04-28 | End: 2021-01-07 | Stop reason: ALTCHOICE

## 2020-06-25 DIAGNOSIS — I10 BENIGN ESSENTIAL HYPERTENSION: ICD-10-CM

## 2020-06-25 RX ORDER — METOPROLOL SUCCINATE 25 MG/1
25 TABLET, EXTENDED RELEASE ORAL DAILY
Qty: 90 TABLET | Refills: 0 | Status: SHIPPED | OUTPATIENT
Start: 2020-06-25

## 2020-08-03 DIAGNOSIS — I95.1 ORTHOSTATIC HYPOTENSION: ICD-10-CM

## 2020-08-03 RX ORDER — IRBESARTAN 150 MG/1
300 TABLET ORAL AT BEDTIME
Qty: 180 TABLET | Refills: 0 | Status: SHIPPED | OUTPATIENT
Start: 2020-08-03 | End: 2021-01-13

## 2020-08-04 ENCOUNTER — TELEPHONE (OUTPATIENT)
Dept: CARDIOLOGY | Facility: CLINIC | Age: 75
End: 2020-08-04

## 2020-08-04 NOTE — TELEPHONE ENCOUNTER
"Received faxed request to refill HCTZ w/ note from pharmacy, \" pt requests a new rx: py says she is taking 2 hydroxychloroquine. Please send is appropriate.     Called pt, she review her meds & states she is actually taking 2 of the irbesartan not the hydroxychloroquine. Jacinto LI   "

## 2020-11-16 ENCOUNTER — TELEPHONE (OUTPATIENT)
Dept: CARDIOLOGY | Facility: CLINIC | Age: 75
End: 2020-11-16

## 2020-11-16 NOTE — TELEPHONE ENCOUNTER
Received call from pt stating that her BP has been running high for a few days, BP is 172/90 today, but was as high as 205/90 a few days ago. She denies having a headache or any other symptoms. Pt scheduled to see Dr. Waggoner 11/18/20. Jacinto LI

## 2020-11-18 ENCOUNTER — OFFICE VISIT (OUTPATIENT)
Dept: CARDIOLOGY | Facility: CLINIC | Age: 75
End: 2020-11-18
Payer: COMMERCIAL

## 2020-11-18 VITALS
HEIGHT: 64 IN | BODY MASS INDEX: 28.36 KG/M2 | SYSTOLIC BLOOD PRESSURE: 127 MMHG | WEIGHT: 166.1 LBS | HEART RATE: 69 BPM | DIASTOLIC BLOOD PRESSURE: 76 MMHG

## 2020-11-18 DIAGNOSIS — I25.10 CORONARY ARTERY DISEASE INVOLVING NATIVE CORONARY ARTERY OF NATIVE HEART WITHOUT ANGINA PECTORIS: ICD-10-CM

## 2020-11-18 DIAGNOSIS — I10 BENIGN ESSENTIAL HYPERTENSION: Primary | ICD-10-CM

## 2020-11-18 DIAGNOSIS — E78.5 HYPERLIPIDEMIA LDL GOAL <70: ICD-10-CM

## 2020-11-18 DIAGNOSIS — R03.0 ELEVATED BLOOD PRESSURE READING WITHOUT DIAGNOSIS OF HYPERTENSION: ICD-10-CM

## 2020-11-18 DIAGNOSIS — I10 ESSENTIAL HYPERTENSION: ICD-10-CM

## 2020-11-18 PROCEDURE — 99214 OFFICE O/P EST MOD 30 MIN: CPT | Performed by: INTERNAL MEDICINE

## 2020-11-18 RX ORDER — LOSARTAN POTASSIUM 50 MG/1
50 TABLET ORAL DAILY
COMMUNITY
Start: 2020-10-10 | End: 2021-01-07 | Stop reason: ALTCHOICE

## 2020-11-18 RX ORDER — KETOCONAZOLE 20 MG/G
CREAM TOPICAL
COMMUNITY
Start: 2020-10-10

## 2020-11-18 ASSESSMENT — MIFFLIN-ST. JEOR: SCORE: 1243.42

## 2020-11-18 NOTE — LETTER
11/18/2020      [facility] [place] [address] [place] [address]      RE: Princess Edgar       Dear Colleague,    I had the pleasure of seeing Princess Edgar in the [facility].    Service Date: 11/18/2020      HISTORY OF PRESENT ILLNESS:  It is my pleasure to see your patient, Princess Edgar.  Princess Edgar is a pleasant 75-year-old patient with a history of diabetes mellitus and coronary artery bypass grafting.  She had a LIMA to the LAD because of possible restenosis to a proximal LAD stent.  In 06/2019, she underwent coronary angiography at our institution and this showed that the LIMA to the LAD was occluded but the left anterior descending artery stent was only 50% restenosed and fractional flow reserve testing across that LAD showed no flow limitation whatsoever, and she also had a stress echocardiogram performed which showed no evidence of ischemia in the anterior wall.  That last stress echocardiogram was performed in 11/2019.  The patient is not complaining of any chest pains, chest pressure or shortness of breath.  One of the issues that she has is that she appears to have labile hypertension.  She had some days where her blood pressures can go as high as 200 and then some other days her blood pressure is on the lower side.  By all accounts, this morning she recorded a blood pressure of around 200, but then when she came in to see us, her blood pressure was 127/76, and we did compare her automated blood pressure cuff with our blood pressure measurement and they were both very close.  One investigation that was performed was ultrasound of the renal arteries.  This showed that the right renal artery velocities in the midsection were raised suggesting a greater than 60% stenosis.  The left renal artery was within normal limits.  Renal artery stenosis can be associated with labile hypertension.  This renal artery ultrasound was performed on 03/17.   Typically under these circumstances, we would order a CT of the renal arteries.  For some reason, that was not performed.  We did also order a vascular followup for that patient, and that seems to have not happened.  Her most recent lipid profile which was drawn at [facility] was good.  The LDL cholesterol was 65, HDL cholesterol was 50, and the triglycerides were 90, with a total cholesterol of 133, which is excellent.      IMPRESSION:   1.  Coronary artery disease and prior left internal mammary artery graft to the left anterior descending artery and prior stenting of the ostial LAD.  The patient has no evidence of flow-limiting disease on coronary angiography with normal fractional flow reserve, and stress echocardiography this time last year showed no evidence of ischemia.  The patient is asymptomatic with no symptoms of chest discomfort.   2.  Labile hypertension.  The cause of this is unclear, but this patient does have possibly a tight right renal artery, and renal artery stenosis can be associated with labile hypertension.  The renal ultrasound was performed in the teeth of the initial COVID-19 pandemic, and though she had an appointment to see our vascular colleagues, that did not take place.   3.  Excellent lipid profile, well within secondary prevention guidelines.   4.  Mild renal insufficiency.      PLAN:   1.  We will obtain a CT scan with contrast of the renal arteries to determine if the patient has renal artery stenosis.  We will have the patient follow up with India Wilson at that time.  Depending upon the findings of the CT scan of the renal arteries, we will determine whether the patient requires further investigation of that.  In the meantime, I will continue on her present blood pressure medications, which are hydrochlorothiazide 25 mg per day, losartan 50 mg per day, and metoprolol succinate 25 mg per day.      Many thanks for allowing me to be involved in the care of this very nice  patient.      Filipe Llamas MD, FACC       cc:   Madison Langley MD   [facility]   [address] [place]  [address]         FILIPE LLAMAS MD, FACC             D: 2020   T: 2020   MT: AMY      Name:     HANS LLOYD   MRN:      [id number]        Account:      [id number]   :      1945           Service Date: 2020      Document: [id number]        Outpatient Encounter Medications as of 2020   Medication Sig Dispense Refill     albuterol (PROAIR HFA/PROVENTIL HFA/VENTOLIN HFA) 108 (90 Base) MCG/ACT inhaler Inhale 2 puffs into the lungs every 4 hours as needed for shortness of breath / dyspnea or wheezing       aspirin (ASA) 81 MG tablet Take 1 tablet (81 mg) by mouth daily       atorvastatin (LIPITOR) 20 MG tablet Take 20 mg by mouth At Bedtime       Calcium-Magnesium-Zinc 333-133-5 MG TABS per tablet Take 1 tablet by mouth daily       fexofenadine-pseudoePHEDrine (ALLEGRA-D)  MG 12 hr tablet Take 1 tablet by mouth 2 times daily Seasonal use       fluocinonide (LIDEX) 0.05 % external solution        fluticasone (FLONASE) 50 MCG/ACT nasal spray Spray 2 sprays into both nostrils daily as needed        hydrochlorothiazide (HYDRODIURIL) 25 MG tablet Take 1 tablet (25 mg) by mouth daily 90 tablet 1     ketoconazole (NIZORAL) 2 % external cream APPLY TO AFFECTED AREA EVERY DAY       LORAZEPAM PO Take 0.5 mg by mouth nightly as needed for anxiety        losartan (COZAAR) 50 MG tablet Take 50 mg by mouth daily       metFORMIN (GLUCOPHAGE-XR) 500 MG 24 hr tablet Take 1,000 mg by mouth 2 times daily (with meals)        metoprolol succinate ER (TOPROL-XL) 25 MG 24 hr tablet Take 1 tablet (25 mg) by mouth daily 90 tablet 0     Multiple Vitamins-Minerals (MULTI FOR HER PO) Take by mouth daily Plus omega-3       nitroGLYcerin (NITROSTAT) 0.4 MG sublingual tablet For chest pain place 1 tablet under the tongue every 5 minutes for 3 doses. If symptoms persist 5  minutes after 2nd dose call 911. 30 tablet 1     olopatadine (PATADAY) 0.2 % ophthalmic solution Place 1 drop into both eyes daily as needed       omeprazole (PRILOSEC) 20 MG DR capsule Take 20 mg by mouth       traZODone (DESYREL) 100 MG tablet Take 100 mg by mouth At Bedtime        venlafaxine (EFFEXOR-XR) 150 MG 24 hr capsule Take 150 mg by mouth daily       diclofenac (VOLTAREN) 1 % topical gel Place 2 g onto the skin 4 times daily as needed        diphenoxylate-atropine (LOMOTIL) 2.5-0.025 MG tablet Take 1 tablet by mouth       famotidine (PEPCID) 20 MG tablet Take 20 mg by mouth 2 times daily        irbesartan (AVAPRO) 150 MG tablet Take 2 tablets (300 mg) by mouth At Bedtime (Patient not taking: Reported on 11/18/2020) 180 tablet 0     No facility-administered encounter medications on file as of 11/18/2020.        Again, thank you for allowing me to participate in the care of your patient.      Sincerely,    Filipe Waggoner MD, MD     [facility]

## 2020-11-18 NOTE — PROGRESS NOTES
HPI and Plan:   See dictation    Orders Placed This Encounter   Procedures     CT Abdomen Angio w/o & w Contrast     Follow-Up with Cardiac Advanced Practice Provider       Orders Placed This Encounter   Medications     ketoconazole (NIZORAL) 2 % external cream     Sig: APPLY TO AFFECTED AREA EVERY DAY     losartan (COZAAR) 50 MG tablet     Sig: Take 50 mg by mouth daily       There are no discontinued medications.      Encounter Diagnoses   Name Primary?     Benign essential hypertension Yes     Essential hypertension      Coronary artery disease involving native coronary artery of native heart without angina pectoris      Hyperlipidemia LDL goal <70      Elevated blood pressure reading without diagnosis of hypertension        CURRENT MEDICATIONS:  Current Outpatient Medications   Medication Sig Dispense Refill     albuterol (PROAIR HFA/PROVENTIL HFA/VENTOLIN HFA) 108 (90 Base) MCG/ACT inhaler Inhale 2 puffs into the lungs every 4 hours as needed for shortness of breath / dyspnea or wheezing       aspirin (ASA) 81 MG tablet Take 1 tablet (81 mg) by mouth daily       atorvastatin (LIPITOR) 20 MG tablet Take 20 mg by mouth At Bedtime       Calcium-Magnesium-Zinc 333-133-5 MG TABS per tablet Take 1 tablet by mouth daily       fexofenadine-pseudoePHEDrine (ALLEGRA-D)  MG 12 hr tablet Take 1 tablet by mouth 2 times daily Seasonal use       fluocinonide (LIDEX) 0.05 % external solution        fluticasone (FLONASE) 50 MCG/ACT nasal spray Spray 2 sprays into both nostrils daily as needed        hydrochlorothiazide (HYDRODIURIL) 25 MG tablet Take 1 tablet (25 mg) by mouth daily 90 tablet 1     ketoconazole (NIZORAL) 2 % external cream APPLY TO AFFECTED AREA EVERY DAY       LORAZEPAM PO Take 0.5 mg by mouth nightly as needed for anxiety        losartan (COZAAR) 50 MG tablet Take 50 mg by mouth daily       metFORMIN (GLUCOPHAGE-XR) 500 MG 24 hr tablet Take 1,000 mg by mouth 2 times daily (with meals)        metoprolol  succinate ER (TOPROL-XL) 25 MG 24 hr tablet Take 1 tablet (25 mg) by mouth daily 90 tablet 0     Multiple Vitamins-Minerals (MULTI FOR HER PO) Take by mouth daily Plus omega-3       nitroGLYcerin (NITROSTAT) 0.4 MG sublingual tablet For chest pain place 1 tablet under the tongue every 5 minutes for 3 doses. If symptoms persist 5 minutes after 2nd dose call 911. 30 tablet 1     olopatadine (PATADAY) 0.2 % ophthalmic solution Place 1 drop into both eyes daily as needed       omeprazole (PRILOSEC) 20 MG DR capsule Take 20 mg by mouth       traZODone (DESYREL) 100 MG tablet Take 100 mg by mouth At Bedtime        venlafaxine (EFFEXOR-XR) 150 MG 24 hr capsule Take 150 mg by mouth daily       diclofenac (VOLTAREN) 1 % topical gel Place 2 g onto the skin 4 times daily as needed        diphenoxylate-atropine (LOMOTIL) 2.5-0.025 MG tablet Take 1 tablet by mouth       famotidine (PEPCID) 20 MG tablet Take 20 mg by mouth 2 times daily        irbesartan (AVAPRO) 150 MG tablet Take 2 tablets (300 mg) by mouth At Bedtime (Patient not taking: Reported on 11/18/2020) 180 tablet 0       ALLERGIES     Allergies   Allergen Reactions     Amoxicillin-Pot Clavulanate GI Disturbance     Lisinopril Cough       PAST MEDICAL HISTORY:  Past Medical History:   Diagnosis Date     Anxiety      Arthritis      BPPV (benign paroxysmal positional vertigo)      Chronic insomnia 11/29/2016    ativan at bedtime since knee surgery in 5187-7287 - discussed not rec long term - dependency risk.  Trial of trazodone 8/2016 caused paradoxical effect and jittery, however it tolerating well 100 mg 1/2018 -6/18/2018 and effective.     Chronic seasonal allergic rhinitis due to fungal spores 9/17/2015    allegra-D 12 hour, OTC flonase     CKD (chronic kidney disease) stage 3, GFR 30-59 ml/min 6/23/2016    losartan     Controlled type 2 diabetes mellitus with stage 3 chronic kidney disease, without long-term current use of insulin (H) 9/11/2015    Prior Metformin XR  hypoglycemia, XR stomach upset - tolerating Metformin   mg BID fall 2015 -> c/o GI so only taking 1/2 pill in the evening  as of 4/2016     Coronary artery disease involving native coronary artery of native heart without angina pectoris 9/17/2015    S/p CABG 1999, on ASA, Bblocker, statin, ARB. 6/2019 6/2019 Cor angio for unstable angina 1. Chronically occluded LIMA to LAD and moderate ostial/proximal LAD disease (non-flow limiting) 2. Moderately elevated left heart filling pressure (LVEDP 22 mmHg)     GERD (gastroesophageal reflux disease)      HLD (hyperlipidemia)      Hypertension      IBS (irritable bowel syndrome)      Obstructive sleep apnea 11/30/2015 11/29/2016 reported never got a CPAP machine.  Setting: APAP 5-12 Supplied by: [facility] PSG done: 11/10/2015 AHI 5 RDI 8 Lowest O2 Sat: 80% New 11/25/2015       PAST SURGICAL HISTORY:  Past Surgical History:   Procedure Laterality Date     CARDIAC SURGERY       CV ANGIOGRAM CORONARY GRAFT N/A 6/11/2019    Procedure: Angiogram Coronary Graft;  Surgeon: Hayden Muñiz MD;  Location:  HEART CARDIAC CATH LAB     CV INSTANTANEOUS WAVE-FREE RATIO N/A 6/11/2019    Procedure: Instantaneous Wave-Free Ratio;  Surgeon: Hayden Muñiz MD;  Location:  HEART CARDIAC CATH LAB     CV LEFT HEART CATH N/A 6/11/2019    Procedure: Left Heart Cath;  Surgeon: Hayden Muñiz MD;  Location:  HEART CARDIAC CATH LAB     EYE SURGERY       ORTHOPEDIC SURGERY         FAMILY HISTORY:  Family History   Problem Relation Age of Onset     Arthritis Mother      Coronary Artery Disease Father      Cancer Father        SOCIAL HISTORY:  Social History     Socioeconomic History     Marital status: Single     Spouse name: None     Number of children: 1     Years of education: None     Highest education level: None   Occupational History     Occupation: retired   Social Needs     Financial resource strain: None     Food insecurity     Worry: None     Inability: None      "Transportation needs     Medical: None     Non-medical: None   Tobacco Use     Smoking status: Former Smoker     Start date:      Quit date:      Years since quittin.9     Smokeless tobacco: Never Used   Substance and Sexual Activity     Alcohol use: Yes     Drug use: No     Sexual activity: None   Lifestyle     Physical activity     Days per week: None     Minutes per session: None     Stress: None   Relationships     Social connections     Talks on phone: None     Gets together: None     Attends Cheondoism service: None     Active member of club or organization: None     Attends meetings of clubs or organizations: None     Relationship status: None     Intimate partner violence     Fear of current or ex partner: None     Emotionally abused: None     Physically abused: None     Forced sexual activity: None   Other Topics Concern     None   Social History Narrative    Retired, lives in a senior apartment.         Review of Systems:  Skin:          Eyes:         ENT:  Positive for hearing loss    Respiratory:  Positive for sleep apnea;shortness of breath     Cardiovascular:    Positive for;palpitations    Gastroenterology:        Genitourinary:         Musculoskeletal:         Neurologic:         Psychiatric:         Heme/Lymph/Imm:         Endocrine:           Physical Exam:  Vitals: /76 (BP Location: Right arm, Patient Position: Sitting, Cuff Size: Adult Regular)   Pulse 69   Ht 1.626 m (5' 4\")   Wt 75.3 kg (166 lb 1.6 oz)   LMP  (LMP Unknown)   Breastfeeding No   BMI 28.51 kg/m      Constitutional:  cooperative, alert and oriented, well developed, well nourished, in no acute distress overweight      Skin:  warm and dry to the touch          Head:  normocephalic        Eyes:  pupils equal and round        Lymph:No Cervical lymphadenopathy present     ENT:  no pallor or cyanosis        Neck:  carotid pulses are full and equal bilaterally, JVP normal, no carotid bruit        Respiratory:  " normal breath sounds, clear to auscultation, normal A-P diameter, normal symmetry, normal respiratory excursion, no use of accessory muscles         Cardiac: regular rhythm, normal S1/S2, no S3 or S4, apical impulse not displaced, no murmurs, gallops or rubs                pulses full and equal                                        GI:    obese      Extremities and Muscular Skeletal:  no deformities, clubbing, cyanosis, erythema observed;no edema              Neurological:  no gross motor deficits;affect appropriate        Psych:  Alert and Oriented x 3 Anxious      CC  No referring provider defined for this encounter.

## 2020-11-18 NOTE — LETTER
11/18/2020    [facility] [address] [place] [address]    RE: Princess Edgar       Dear Colleague,    I had the pleasure of seeing Princess Edgar in the [facility].    HPI and Plan:   See dictation    Orders Placed This Encounter   Procedures     CT Abdomen Angio w/o & w Contrast     Follow-Up with Cardiac Advanced Practice Provider       Orders Placed This Encounter   Medications     ketoconazole (NIZORAL) 2 % external cream     Sig: APPLY TO AFFECTED AREA EVERY DAY     losartan (COZAAR) 50 MG tablet     Sig: Take 50 mg by mouth daily       There are no discontinued medications.      Encounter Diagnoses   Name Primary?     Benign essential hypertension Yes     Essential hypertension      Coronary artery disease involving native coronary artery of native heart without angina pectoris      Hyperlipidemia LDL goal <70      Elevated blood pressure reading without diagnosis of hypertension        CURRENT MEDICATIONS:  Current Outpatient Medications   Medication Sig Dispense Refill     albuterol (PROAIR HFA/PROVENTIL HFA/VENTOLIN HFA) 108 (90 Base) MCG/ACT inhaler Inhale 2 puffs into the lungs every 4 hours as needed for shortness of breath / dyspnea or wheezing       aspirin (ASA) 81 MG tablet Take 1 tablet (81 mg) by mouth daily       atorvastatin (LIPITOR) 20 MG tablet Take 20 mg by mouth At Bedtime       Calcium-Magnesium-Zinc 333-133-5 MG TABS per tablet Take 1 tablet by mouth daily       fexofenadine-pseudoePHEDrine (ALLEGRA-D)  MG 12 hr tablet Take 1 tablet by mouth 2 times daily Seasonal use       fluocinonide (LIDEX) 0.05 % external solution        fluticasone (FLONASE) 50 MCG/ACT nasal spray Spray 2 sprays into both nostrils daily as needed        hydrochlorothiazide (HYDRODIURIL) 25 MG tablet Take 1 tablet (25 mg) by mouth daily 90 tablet 1     ketoconazole (NIZORAL) 2 % external cream APPLY TO AFFECTED AREA EVERY DAY       LORAZEPAM PO Take  0.5 mg by mouth nightly as needed for anxiety        losartan (COZAAR) 50 MG tablet Take 50 mg by mouth daily       metFORMIN (GLUCOPHAGE-XR) 500 MG 24 hr tablet Take 1,000 mg by mouth 2 times daily (with meals)        metoprolol succinate ER (TOPROL-XL) 25 MG 24 hr tablet Take 1 tablet (25 mg) by mouth daily 90 tablet 0     Multiple Vitamins-Minerals (MULTI FOR HER PO) Take by mouth daily Plus omega-3       nitroGLYcerin (NITROSTAT) 0.4 MG sublingual tablet For chest pain place 1 tablet under the tongue every 5 minutes for 3 doses. If symptoms persist 5 minutes after 2nd dose call 911. 30 tablet 1     olopatadine (PATADAY) 0.2 % ophthalmic solution Place 1 drop into both eyes daily as needed       omeprazole (PRILOSEC) 20 MG DR capsule Take 20 mg by mouth       traZODone (DESYREL) 100 MG tablet Take 100 mg by mouth At Bedtime        venlafaxine (EFFEXOR-XR) 150 MG 24 hr capsule Take 150 mg by mouth daily       diclofenac (VOLTAREN) 1 % topical gel Place 2 g onto the skin 4 times daily as needed        diphenoxylate-atropine (LOMOTIL) 2.5-0.025 MG tablet Take 1 tablet by mouth       famotidine (PEPCID) 20 MG tablet Take 20 mg by mouth 2 times daily        irbesartan (AVAPRO) 150 MG tablet Take 2 tablets (300 mg) by mouth At Bedtime (Patient not taking: Reported on 11/18/2020) 180 tablet 0       ALLERGIES     Allergies   Allergen Reactions     Amoxicillin-Pot Clavulanate GI Disturbance     Lisinopril Cough       PAST MEDICAL HISTORY:  Past Medical History:   Diagnosis Date     Anxiety      Arthritis      BPPV (benign paroxysmal positional vertigo)      Chronic insomnia 11/29/2016    ativan at bedtime since knee surgery in [id number] - discussed not rec long term - dependency risk.  Trial of trazodone 8/2016 caused paradoxical effect and jittery, however it tolerating well 100 mg 1/2018 -6/18/2018 and effective.     Chronic seasonal allergic rhinitis due to fungal spores 9/17/2015    allegra-D 12 hour, OTC flonase      CKD (chronic kidney disease) stage 3, GFR 30-59 ml/min 6/23/2016    losartan     Controlled type 2 diabetes mellitus with stage 3 chronic kidney disease, without long-term current use of insulin (H) 9/11/2015    Prior Metformin XR hypoglycemia, XR stomach upset - tolerating Metformin   mg BID fall 2015 -> c/o GI so only taking 1/2 pill in the evening  as of 4/2016     Coronary artery disease involving native coronary artery of native heart without angina pectoris 9/17/2015    S/p CABG 1999, on ASA, Bblocker, statin, ARB. 6/2019 6/2019 Cor angio for unstable angina 1. Chronically occluded LIMA to LAD and moderate ostial/proximal LAD disease (non-flow limiting) 2. Moderately elevated left heart filling pressure (LVEDP 22 mmHg)     GERD (gastroesophageal reflux disease)      HLD (hyperlipidemia)      Hypertension      IBS (irritable bowel syndrome)      Obstructive sleep apnea 11/30/2015 11/29/2016 reported never got a CPAP machine.  Setting: APAP 5-12 Supplied by: [facility] PSG done: 11/10/2015 AHI 5 RDI 8 Lowest O2 Sat: 80% New 11/25/2015       PAST SURGICAL HISTORY:  Past Surgical History:   Procedure Laterality Date     CARDIAC SURGERY       CV ANGIOGRAM CORONARY GRAFT N/A 6/11/2019    Procedure: Angiogram Coronary Graft;  Surgeon: Hayden Muñiz MD;  Location:  HEART CARDIAC CATH LAB     CV INSTANTANEOUS WAVE-FREE RATIO N/A 6/11/2019    Procedure: Instantaneous Wave-Free Ratio;  Surgeon: Hayden Muñiz MD;  Location:  HEART CARDIAC CATH LAB     CV LEFT HEART CATH N/A 6/11/2019    Procedure: Left Heart Cath;  Surgeon: Hayden Muñiz MD;  Location:  HEART CARDIAC CATH LAB     EYE SURGERY       ORTHOPEDIC SURGERY         FAMILY HISTORY:  Family History   Problem Relation Age of Onset     Arthritis Mother      Coronary Artery Disease Father      Cancer Father        SOCIAL HISTORY:  Social History     Socioeconomic History     Marital status: Single     Spouse name: None     Number of children: 1  "    Years of education: None     Highest education level: None   Occupational History     Occupation: retired   Social Needs     Financial resource strain: None     Food insecurity     Worry: None     Inability: None     Transportation needs     Medical: None     Non-medical: None   Tobacco Use     Smoking status: Former Smoker     Start date:      Quit date:      Years since quittin.9     Smokeless tobacco: Never Used   Substance and Sexual Activity     Alcohol use: Yes     Drug use: No     Sexual activity: None   Lifestyle     Physical activity     Days per week: None     Minutes per session: None     Stress: None   Relationships     Social connections     Talks on phone: None     Gets together: None     Attends Restorationism service: None     Active member of club or organization: None     Attends meetings of clubs or organizations: None     Relationship status: None     Intimate partner violence     Fear of current or ex partner: None     Emotionally abused: None     Physically abused: None     Forced sexual activity: None   Other Topics Concern     None   Social History Narrative    Retired, lives in a senior apartment.         Review of Systems:  Skin:          Eyes:         ENT:  Positive for hearing loss    Respiratory:  Positive for sleep apnea;shortness of breath     Cardiovascular:    Positive for;palpitations    Gastroenterology:        Genitourinary:         Musculoskeletal:         Neurologic:         Psychiatric:         Heme/Lymph/Imm:         Endocrine:           Physical Exam:  Vitals: /76 (BP Location: Right arm, Patient Position: Sitting, Cuff Size: Adult Regular)   Pulse 69   Ht 1.626 m (5' 4\")   Wt 75.3 kg (166 lb 1.6 oz)   LMP  (LMP Unknown)   Breastfeeding No   BMI 28.51 kg/m      Constitutional:  cooperative, alert and oriented, well developed, well nourished, in no acute distress overweight      Skin:  warm and dry to the touch          Head:  normocephalic        Eyes: "  pupils equal and round        Lymph:No Cervical lymphadenopathy present     ENT:  no pallor or cyanosis        Neck:  carotid pulses are full and equal bilaterally, JVP normal, no carotid bruit        Respiratory:  normal breath sounds, clear to auscultation, normal A-P diameter, normal symmetry, normal respiratory excursion, no use of accessory muscles         Cardiac: regular rhythm, normal S1/S2, no S3 or S4, apical impulse not displaced, no murmurs, gallops or rubs                pulses full and equal                                        GI:    obese      Extremities and Muscular Skeletal:  no deformities, clubbing, cyanosis, erythema observed;no edema              Neurological:  no gross motor deficits;affect appropriate        Psych:  Alert and Oriented x 3 Anxious      CC  No referring provider defined for this encounter.                  Thank you for allowing me to participate in the care of your patient.      Sincerely,     Filipe Waggoner MD, MD     [facility]    cc:   No referring provider defined for this encounter.

## 2020-11-18 NOTE — PROGRESS NOTES
Service Date: 11/18/2020      HISTORY OF PRESENT ILLNESS:  It is my pleasure to see your patient, Princess Edgar.  Princess Edgar is a pleasant 75-year-old patient with a history of diabetes mellitus and coronary artery bypass grafting.  She had a LIMA to the LAD because of possible restenosis to a proximal LAD stent.  In 06/2019, she underwent coronary angiography at our institution and this showed that the LIMA to the LAD was occluded but the left anterior descending artery stent was only 50% restenosed and fractional flow reserve testing across that LAD showed no flow limitation whatsoever, and she also had a stress echocardiogram performed which showed no evidence of ischemia in the anterior wall.  That last stress echocardiogram was performed in 11/2019.  The patient is not complaining of any chest pains, chest pressure or shortness of breath.  One of the issues that she has is that she appears to have labile hypertension.  She had some days where her blood pressures can go as high as 200 and then some other days her blood pressure is on the lower side.  By all accounts, this morning she recorded a blood pressure of around 200, but then when she came in to see us, her blood pressure was 127/76, and we did compare her automated blood pressure cuff with our blood pressure measurement and they were both very close.  One investigation that was performed was ultrasound of the renal arteries.  This showed that the right renal artery velocities in the midsection were raised suggesting a greater than 60% stenosis.  The left renal artery was within normal limits.  Renal artery stenosis can be associated with labile hypertension.  This renal artery ultrasound was performed on 03/17.  Typically under these circumstances, we would order a CT of the renal arteries.  For some reason, that was not performed.  We did also order a vascular followup for that patient, and that seems to have not happened.  Her most recent lipid  profile which was drawn at [facility] was good.  The LDL cholesterol was 65, HDL cholesterol was 50, and the triglycerides were 90, with a total cholesterol of 133, which is excellent.      IMPRESSION:   1.  Coronary artery disease and prior left internal mammary artery graft to the left anterior descending artery and prior stenting of the ostial LAD.  The patient has no evidence of flow-limiting disease on coronary angiography with normal fractional flow reserve, and stress echocardiography this time last year showed no evidence of ischemia.  The patient is asymptomatic with no symptoms of chest discomfort.   2.  Labile hypertension.  The cause of this is unclear, but this patient does have possibly a tight right renal artery, and renal artery stenosis can be associated with labile hypertension.  The renal ultrasound was performed in the teeth of the initial COVID-19 pandemic, and though she had an appointment to see our vascular colleagues, that did not take place.   3.  Excellent lipid profile, well within secondary prevention guidelines.   4.  Mild renal insufficiency.      PLAN:   1.  We will obtain a CT scan with contrast of the renal arteries to determine if the patient has renal artery stenosis.  We will have the patient follow up with India Wilson at that time.  Depending upon the findings of the CT scan of the renal arteries, we will determine whether the patient requires further investigation of that.  In the meantime, I will continue on her present blood pressure medications, which are hydrochlorothiazide 25 mg per day, losartan 50 mg per day, and metoprolol succinate 25 mg per day.      Many thanks for allowing me to be involved in the care of this very nice patient.      Filipe Llamas MD, FACC       cc:   Madison aLngley MD   [facility]   [address] [place]  [address]         FILIPE LLAMAS MD, FACC             D: 11/18/2020   T: 11/18/2020   MT: AMY      Name:      HANS LLOYD   MRN:      [id number]        Account:      [id number]   :      1945           Service Date: 2020      Document: [id number]

## 2020-11-25 ENCOUNTER — TELEPHONE (OUTPATIENT)
Dept: CARDIOLOGY | Facility: CLINIC | Age: 75
End: 2020-11-25

## 2020-12-22 NOTE — TELEPHONE ENCOUNTER
Received call from pt asking if we can use a PET scan done at [facility] in place of the CT for renal arteries.     Reviewed PET scan dated 12/10/20 showing   HEAD/NECK: No enlarged or hypermetabolic lymph nodes.     CHEST:  No hypermetabolic lymph nodes or lung nodules.  No effusions.    ABDOMEN/PELVIS:  No abnormal hypermetabolic activity in the solid organs.  No enlarged or hypermetabolic lymph nodes.  No ascites. No dilatation of the GI or urinary tracts.    EXTREMITIES/SKELETON: Activity noted along the right iliac bone, likely related to recent biopsy. No other suspicious hypermetabolic activity identified.    IMPRESSION: No scintigraphic evidence of metastatic disease.    Will message Dr. Waggoner to review.

## 2020-12-28 ENCOUNTER — DOCUMENTATION ONLY (OUTPATIENT)
Dept: CARDIOLOGY | Facility: CLINIC | Age: 75
End: 2020-12-28

## 2020-12-28 DIAGNOSIS — Z86.79 H/O RENAL ARTERY STENOSIS: Primary | ICD-10-CM

## 2020-12-28 NOTE — PROGRESS NOTES
Patient due to have CT of renal arteries but creatinine raised. Needs to have nephrology appointment arranged. thx

## 2020-12-30 NOTE — PROGRESS NOTES
Called pt re: seeing nephrology. Pt is scheduled to see Dr. Holman with [facility] neph[facility] 1/27/21. Called Dr. Holman's office & asked to have apt moved up. There are no openings with Dr. cordero but they will have her see NP 1/07/21. Jacinto LI

## 2020-12-31 ENCOUNTER — HOSPITAL ENCOUNTER (OUTPATIENT)
Dept: ULTRASOUND IMAGING | Facility: CLINIC | Age: 75
Discharge: HOME | End: 2020-12-31
Attending: INTERNAL MEDICINE | Admitting: INTERNAL MEDICINE
Payer: COMMERCIAL

## 2020-12-31 DIAGNOSIS — Z86.79 H/O RENAL ARTERY STENOSIS: ICD-10-CM

## 2020-12-31 PROCEDURE — 93975 VASCULAR STUDY: CPT

## 2021-01-04 ENCOUNTER — TELEPHONE (OUTPATIENT)
Dept: CARDIOLOGY | Facility: CLINIC | Age: 76
End: 2021-01-04

## 2021-01-04 DIAGNOSIS — I10 BENIGN ESSENTIAL HYPERTENSION: Primary | ICD-10-CM

## 2021-01-04 DIAGNOSIS — I10 ESSENTIAL HYPERTENSION: ICD-10-CM

## 2021-01-04 NOTE — TELEPHONE ENCOUNTER
Cannot have MRA due to significantly reduced GFR nor can she have CTA. Stop hydrochlorothiazide and stop losartan . Start amlodipine 5 mg per day and needs to be seen by DELORES in 1 week for further titration of meds with possibly adding hydralazine or clonidine neither of which affects the kidney. Needs BMP on that visit. May need to see a nephrologist if renal function does not improve. Creatnine was around 1.2 earlier this year.Thx

## 2021-01-04 NOTE — TELEPHONE ENCOUNTER
Reviewed renal U/S showing   1. Right kidney: Normal grayscale imaging. No findings to support a diagnosis of right renal artery stenosis.   The previously identified velocity elevation in the mid right renal artery is not present on today's examination.   Since there is a discrepancy between today's examination and the previous duplex, consideration could be given to obtaining a renal MRA to more definitively image the right renal artery.  2. Left kidney: Normal gray scale imaging. No findings to suggest left renal artery stenosis.    Will message Dr. Waggoner to review. Jacinto LI

## 2021-01-07 RX ORDER — AMLODIPINE BESYLATE 5 MG/1
5 TABLET ORAL DAILY
Qty: 90 TABLET | Refills: 0 | Status: SHIPPED | OUTPATIENT
Start: 2021-01-07 | End: 2021-01-13

## 2021-01-07 NOTE — TELEPHONE ENCOUNTER
Pt called back, informed of recommendations from Dr. Waggoner. Prescription escripted to [facility] as requested & pt scheduled to see Marge Llamas NP 1/13/21. BMP ordered & scheduled for 1/12/21. Pt has apt to see nephrology w/ [facility] later this month. Jacinto LI

## 2021-01-07 NOTE — TELEPHONE ENCOUNTER
Attempted to call pt with recommendations from Dr. Waggoner, left message for pt to call back. Jacinto LI

## 2021-01-11 ENCOUNTER — MEDICAL CORRESPONDENCE (OUTPATIENT)
Dept: HEALTH INFORMATION MANAGEMENT | Facility: CLINIC | Age: 76
End: 2021-01-11

## 2021-01-11 DIAGNOSIS — N18.30 CHRONIC KIDNEY DISEASE, STAGE III (MODERATE) (H): Primary | ICD-10-CM

## 2021-01-12 DIAGNOSIS — I10 ESSENTIAL HYPERTENSION: ICD-10-CM

## 2021-01-12 DIAGNOSIS — N18.30 CHRONIC KIDNEY DISEASE, STAGE III (MODERATE) (H): ICD-10-CM

## 2021-01-12 LAB
ALBUMIN SERPL-MCNC: 3.3 G/DL (ref 3.4–5)
ANION GAP SERPL CALCULATED.3IONS-SCNC: <1 MMOL/L (ref 3–14)
BUN SERPL-MCNC: 33 MG/DL (ref 7–30)
CALCIUM SERPL-MCNC: 8.8 MG/DL (ref 8.5–10.1)
CHLORIDE SERPL-SCNC: 109 MMOL/L (ref 94–109)
CO2 SERPL-SCNC: 32 MMOL/L (ref 20–32)
CREAT SERPL-MCNC: 1.58 MG/DL (ref 0.52–1.04)
GFR SERPL CREATININE-BSD FRML MDRD: 32 ML/MIN/{1.73_M2}
GLUCOSE SERPL-MCNC: 239 MG/DL (ref 70–99)
PHOSPHATE SERPL-MCNC: 4.1 MG/DL (ref 2.5–4.5)
POTASSIUM SERPL-SCNC: 4.6 MMOL/L (ref 3.4–5.3)
SODIUM SERPL-SCNC: 141 MMOL/L (ref 133–144)

## 2021-01-12 PROCEDURE — 80069 RENAL FUNCTION PANEL: CPT | Performed by: INTERNAL MEDICINE

## 2021-01-12 PROCEDURE — 36415 COLL VENOUS BLD VENIPUNCTURE: CPT | Performed by: INTERNAL MEDICINE

## 2021-01-12 NOTE — PROGRESS NOTES
HISTORY OF PRESENT ILLNESS:    This is a 75 year old female who follows with Dr. Waggoner at [facility]  She is seen in our clinic for hypertension, coronary artery disease, diabetes, chronic kidney disease, hyperlipidemia, general anxiety disorder, irritable bowel syndrome and MGUS    Ms Edgar has a long history of coronary artery disease and prior LAD stenting  She eventually underwent single vessel CABG (1999) and received a LIMA to her LAD.      Coronary angiogram (6/2019) showed that her LIMA to LAD was occluded but that her stent in her native LAD was patent with 50% stenosis and not found to be flow limiting.  A follow up stress echo did not show any ischemia in the anterior wall.    She does have labile hypertension and at times her SBP has been up to 200 mmHg  A renal ultrasound (3/2020) showed some elevated velocities suggesting > 60% stenosis in her right renal artery.  She initially did not follow through with a vascular evaluation.    In 2000, her anti-hypertensive agents were intensified by adding hydrochlorothiazide  Recently, she was noted to have worsening kidney function with Creatinine of 1.9  She was asked to stop hydrochlorothiazide and Losartan and start Amlodipine this past week    I extensively reviewed records in [facility] and [facility] along with labs and recent office visits   Her renal ultrasound was repeated (12/31/20) through [facility] system which did not show concern for renal artery stenosis.  Her Metformin was stopped.  She saw a nephrologist (1/11/21) who felt that her acute renal insufficiency was from volume depletion.  Her hydrochlorothiazide and Losartan were stopped and Amlodipine started per Dr. Waggoner's and her Nephrologist's recommendation earlier this week    She also has been seen by hematology and Ms Edgar's labs are consistent with MGUS.  Her [place] red stain was negative for amyloid    Our visit today is for further review    Ms  Tala started taking Amlodipine and stopped her hydrochlorothiazide/losartan last week.  She checks her BP at home and notes persistent elevated SBPs >  140 mmHg (which I also reviewed on her machine)  She denies any chest pain and has not used a NTG in a long time.  She has some mild dyspnea on exertion that is unchanged.  She does not exercise on a regular basis  She rarely feels a skipped beat, but denies any significant palpitations.  She has no peripheral edema and denies orthopnea.  She tells me that she had a sleep study a few years ago and was told that a CPAP was optional, so she did not obtain one.      I reviewed her renal panel (1/12/21) Sodium: 141  Potassium: 4.6  Albumin: 3.3  BUN: 33  Creatinine:  1.58 (improved from 1.9)      VITAL SIGNS:  BP: 183/83 mmHg  Pulse: 62  Weight: 168 lbs    IMPRESSION AND PLAN:    Coronary Artery Disease:  -s/p 1V CABG (1999) with LIMA to LAD  -cath (6/2019) showed occlusion of LIMA with 50% native LAD disease not flow limiting  -STRECO (11/2019) showed no ischemia  LVEF > 70%    Labile Hypertension  -due to recent renal insufficiency, her hydrochlorothiazide and Losartan stopped and Amlodipine added  -on amlodipine 5 mg, Metoprolol XR 25 mg  -BP elevated today  I will have her increase her Amlodipine to 10 mg and move to evening dosing.  -return for follow up in 1 month    Renal Artery Stenosis  -increased right renal artery velocities suggesting > 60% stenosis per ultrasound (3/2020), but showed normal velocities (12/2020)  -unable to obtain CT for further delineation due to renal insufficiency  Nephrology following    Stage III CKD  -recently saw nephrology, with plans for re-visit later this week  -baseline Cr 1.1-1.3  -recent acute renal insufficiency likely related to volume depletion for hydrochlorothiazide (now stopped)  -improved cr. To 1.5  (previously 1.9)    Hyperlipidemia  -on Atorvastatin 20 mg  -LDL 65 (at PMD 10/2020)    Type II Diabetes  -hgb A1C 6.2%  (10/2020)      Orders Placed This Encounter   Procedures     Follow-Up with Cardiac Advanced Practice Provider       Orders Placed This Encounter   Medications     amLODIPine (NORVASC) 10 MG tablet     Sig: Take 1 tablet (10 mg) by mouth every evening     Dispense:  90 tablet     Refill:  1       Medications Discontinued During This Encounter   Medication Reason     Calcium-Magnesium-Zinc 333-133-5 MG TABS per tablet Therapy completed     diclofenac (VOLTAREN) 1 % topical gel Therapy completed     diphenoxylate-atropine (LOMOTIL) 2.5-0.025 MG tablet Therapy completed     irbesartan (AVAPRO) 150 MG tablet Therapy completed     metFORMIN (GLUCOPHAGE-XR) 500 MG 24 hr tablet Therapy completed     amLODIPine (NORVASC) 5 MG tablet          Encounter Diagnosis   Name Primary?     Essential hypertension        CURRENT MEDICATIONS:  Current Outpatient Medications   Medication Sig Dispense Refill     albuterol (PROAIR HFA/PROVENTIL HFA/VENTOLIN HFA) 108 (90 Base) MCG/ACT inhaler Inhale 2 puffs into the lungs every 4 hours as needed for shortness of breath / dyspnea or wheezing       amLODIPine (NORVASC) 10 MG tablet Take 1 tablet (10 mg) by mouth every evening 90 tablet 1     aspirin (ASA) 81 MG tablet Take 1 tablet (81 mg) by mouth daily       atorvastatin (LIPITOR) 20 MG tablet Take 20 mg by mouth At Bedtime       famotidine (PEPCID) 20 MG tablet Take 20 mg by mouth daily        fexofenadine-pseudoePHEDrine (ALLEGRA-D)  MG 12 hr tablet Take 1 tablet by mouth 2 times daily Seasonal use       fluocinonide (LIDEX) 0.05 % external solution        fluticasone (FLONASE) 50 MCG/ACT nasal spray Spray 2 sprays into both nostrils daily as needed        ketoconazole (NIZORAL) 2 % external cream APPLY TO AFFECTED AREA EVERY DAY       LORAZEPAM PO Take 0.5 mg by mouth nightly as needed for anxiety        metoprolol succinate ER (TOPROL-XL) 25 MG 24 hr tablet Take 1 tablet (25 mg) by mouth daily 90 tablet 0     Multiple  Vitamins-Minerals (MULTI FOR HER PO) Take by mouth daily Plus omega-3       nitroGLYcerin (NITROSTAT) 0.4 MG sublingual tablet For chest pain place 1 tablet under the tongue every 5 minutes for 3 doses. If symptoms persist 5 minutes after 2nd dose call 911. 30 tablet 1     olopatadine (PATADAY) 0.2 % ophthalmic solution Place 1 drop into both eyes daily as needed       traZODone (DESYREL) 100 MG tablet Take 50 mg by mouth At Bedtime        venlafaxine (EFFEXOR-XR) 150 MG 24 hr capsule Take 150 mg by mouth daily         ALLERGIES     Allergies   Allergen Reactions     Amoxicillin-Pot Clavulanate GI Disturbance     Lisinopril Cough       PAST MEDICAL HISTORY:  Past Medical History:   Diagnosis Date     Anxiety      Arthritis      BPPV (benign paroxysmal positional vertigo)      Chronic insomnia 11/29/2016    ativan at bedtime since knee surgery in [id number] - discussed not rec long term - dependency risk.  Trial of trazodone 8/2016 caused paradoxical effect and jittery, however it tolerating well 100 mg 1/2018 -6/18/2018 and effective.     Chronic seasonal allergic rhinitis due to fungal spores 9/17/2015    allegra-D 12 hour, OTC flonase     CKD (chronic kidney disease) stage 3, GFR 30-59 ml/min 6/23/2016    losartan     Controlled type 2 diabetes mellitus with stage 3 chronic kidney disease, without long-term current use of insulin (H) 9/11/2015    Prior Metformin XR hypoglycemia, XR stomach upset - tolerating Metformin   mg BID fall 2015 -> c/o GI so only taking 1/2 pill in the evening  as of 4/2016     Coronary artery disease involving native coronary artery of native heart without angina pectoris 9/17/2015    S/p CABG 1999, on ASA, Bblocker, statin, ARB. 6/2019 6/2019 Cor angio for unstable angina 1. Chronically occluded LIMA to LAD and moderate ostial/proximal LAD disease (non-flow limiting) 2. Moderately elevated left heart filling pressure (LVEDP 22 mmHg)     GERD (gastroesophageal reflux disease)       HLD (hyperlipidemia)      Hypertension      IBS (irritable bowel syndrome)      Obstructive sleep apnea 2015 reported never got a CPAP machine.  Setting: APAP 5-12 Supplied by: [facility] PSG done: 11/10/2015 AHI 5 RDI 8 Lowest O2 Sat: 80% New 2015       PAST SURGICAL HISTORY:  Past Surgical History:   Procedure Laterality Date     CARDIAC SURGERY       CV ANGIOGRAM CORONARY GRAFT N/A 2019    Procedure: Angiogram Coronary Graft;  Surgeon: Hayden Muñiz MD;  Location:  HEART CARDIAC CATH LAB     CV INSTANTANEOUS WAVE-FREE RATIO N/A 2019    Procedure: Instantaneous Wave-Free Ratio;  Surgeon: Hayden Muñiz MD;  Location:  HEART CARDIAC CATH LAB     CV LEFT HEART CATH N/A 2019    Procedure: Left Heart Cath;  Surgeon: Hayden Muñiz MD;  Location: [facility]ARD[facility]AC C[facility] LAB     EYE SURGERY       ORTHOPEDIC SURGERY         FAMILY HISTORY:  Family History   Problem Relation Age of Onset     Arthritis Mother      Coronary Artery Disease Father      Cancer Father        SOCIAL HISTORY:  Social History     Socioeconomic History     Marital status: Single     Spouse name: None     Number of children: 1     Years of education: None     Highest education level: None   Occupational History     Occupation: retired   Social Needs     Financial resource strain: None     Food insecurity     Worry: None     Inability: None     Transportation needs     Medical: None     Non-medical: None   Tobacco Use     Smoking status: Former Smoker     Start date:      Quit date:      Years since quittin.0     Smokeless tobacco: Never Used   Substance and Sexual Activity     Alcohol use: Yes     Drug use: No     Sexual activity: None   Lifestyle     Physical activity     Days per week: None     Minutes per session: None     Stress: None   Relationships     Social connections     Talks on phone: None     Gets together: None     Attends Scientologist service: None     Active member of club or  "organization: None     Attends meetings of clubs or organizations: None     Relationship status: None     Intimate partner violence     Fear of current or ex partner: None     Emotionally abused: None     Physically abused: None     Forced sexual activity: None   Other Topics Concern     None   Social History Narrative    Retired, lives in a senior apartment.         Review of Systems:  Skin:  Positive for itching     Eyes:  Positive for glasses reading glasses  ENT:  Positive for postnasal drainage;nasal congestion    Respiratory:  Positive for shortness of breath SOB when laying down once in a while   Cardiovascular:    palpitations;Positive for    Gastroenterology: Positive for reflux controlled with meds  Genitourinary:  Positive for urinary frequency    Musculoskeletal:  Positive for back pain;arthritis;joint pain;neck pain hand and feet  pain  Neurologic:  Positive for numbness or tingling of hands;numbness or tingling of feet ahnds and feet a little numb  Psychiatric:  Positive for excessive stress;anxiety;depression    Heme/Lymph/Imm:  Negative      Endocrine:  Positive for diabetes      Physical Exam:  Vitals: BP (!) 183/83 (BP Location: Right arm, Patient Position: Sitting, Cuff Size: Adult Regular)   Pulse 62   Ht 1.626 m (5' 4\")   Wt 76.2 kg (168 lb)   SpO2 97%   BMI 28.84 kg/m      Constitutional:  cooperative;well nourished overweight      Skin:  warm and dry to the touch          Head:  normocephalic        Eyes:  pupils equal and round        Lymph:      ENT:  no pallor or cyanosis        Neck:  JVP normal;no carotid bruit        Respiratory:  clear to auscultation;normal respiratory excursion         Cardiac: regular rhythm;normal S1 and S2     no presence of murmur          pulses full and equal                                        GI:  abdomen soft obese      Extremities and Muscular Skeletal:  no edema              Neurological:  no gross motor deficits;affect appropriate        Psych:  " Alert and Oriented x 3          CC  Filiep Waggoner MD  [id number]  [place] [id number]

## 2021-01-13 ENCOUNTER — OFFICE VISIT (OUTPATIENT)
Dept: CARDIOLOGY | Facility: CLINIC | Age: 76
End: 2021-01-13
Attending: INTERNAL MEDICINE
Payer: COMMERCIAL

## 2021-01-13 VITALS
OXYGEN SATURATION: 97 % | HEIGHT: 64 IN | BODY MASS INDEX: 28.68 KG/M2 | DIASTOLIC BLOOD PRESSURE: 83 MMHG | SYSTOLIC BLOOD PRESSURE: 183 MMHG | WEIGHT: 168 LBS | HEART RATE: 62 BPM

## 2021-01-13 DIAGNOSIS — I10 ESSENTIAL HYPERTENSION: ICD-10-CM

## 2021-01-13 PROCEDURE — 99214 OFFICE O/P EST MOD 30 MIN: CPT | Performed by: NURSE PRACTITIONER

## 2021-01-13 RX ORDER — AMLODIPINE BESYLATE 10 MG/1
10 TABLET ORAL EVERY EVENING
Qty: 90 TABLET | Refills: 1 | Status: SHIPPED | OUTPATIENT
Start: 2021-01-13 | End: 2021-03-09

## 2021-01-13 ASSESSMENT — MIFFLIN-ST. JEOR: SCORE: 1252.04

## 2021-01-13 NOTE — LETTER
1/13/2021    Madison Langley  [facility] [address] [place] [address]    RE: Princess Aguilarnemo       Dear Colleague,    I had the pleasure of seeing Princess Edgar in the [facility].    HISTORY OF PRESENT ILLNESS:    This is a 75 year old female who follows with Dr. Waggoner at [facility]  She is seen in our clinic for hypertension, coronary artery disease, diabetes, chronic kidney disease, hyperlipidemia, general anxiety disorder, irritable bowel syndrome and MGUS    Ms Edgar has a long history of coronary artery disease and prior LAD stenting  She eventually underwent single vessel CABG (1999) and received a LIMA to her LAD.      Coronary angiogram (6/2019) showed that her LIMA to LAD was occluded but that her stent in her native LAD was patent with 50% stenosis and not found to be flow limiting.  A follow up stress echo did not show any ischemia in the anterior wall.    She does have labile hypertension and at times her SBP has been up to 200 mmHg  A renal ultrasound (3/2020) showed some elevated velocities suggesting > 60% stenosis in her right renal artery.  She initially did not follow through with a vascular evaluation.    In 2000, her anti-hypertensive agents were intensified by adding hydrochlorothiazide  Recently, she was noted to have worsening kidney function with Creatinine of 1.9  She was asked to stop hydrochlorothiazide and Losartan and start Amlodipine this past week    I extensively reviewed records in [facility] and [facility] along with labs and recent office visits   Her renal ultrasound was repeated (12/31/20) through [facility] system which did not show concern for renal artery stenosis.  Her Metformin was stopped.  She saw a nephrologist (1/11/21) who felt that her acute renal insufficiency was from volume depletion.  Her hydrochlorothiazide and Losartan were stopped and Amlodipine started per Dr. Waggoner's and her  Nephrologist's recommendation earlier this week    She also has been seen by hematology and Ms Edgar's labs are consistent with MGUS.  Her [place] red stain was negative for amyloid    Our visit today is for further review    Ms Edgar started taking Amlodipine and stopped her hydrochlorothiazide/losartan last week.  She checks her BP at home and notes persistent elevated SBPs >  140 mmHg (which I also reviewed on her machine)  She denies any chest pain and has not used a NTG in a long time.  She has some mild dyspnea on exertion that is unchanged.  She does not exercise on a regular basis  She rarely feels a skipped beat, but denies any significant palpitations.  She has no peripheral edema and denies orthopnea.  She tells me that she had a sleep study a few years ago and was told that a CPAP was optional, so she did not obtain one.      I reviewed her renal panel (1/12/21) Sodium: 141  Potassium: 4.6  Albumin: 3.3  BUN: 33  Creatinine:  1.58 (improved from 1.9)      VITAL SIGNS:  BP: 183/83 mmHg  Pulse: 62  Weight: 168 lbs    IMPRESSION AND PLAN:    Coronary Artery Disease:  -s/p 1V CABG (1999) with LIMA to LAD  -cath (6/2019) showed occlusion of LIMA with 50% native LAD disease not flow limiting  -STRECO (11/2019) showed no ischemia  LVEF > 70%    Labile Hypertension  -due to recent renal insufficiency, her hydrochlorothiazide and Losartan stopped and Amlodipine added  -on amlodipine 5 mg, Metoprolol XR 25 mg  -BP elevated today  I will have her increase her Amlodipine to 10 mg and move to evening dosing.  -return for follow up in 1 month    Renal Artery Stenosis  -increased right renal artery velocities suggesting > 60% stenosis per ultrasound (3/2020), but showed normal velocities (12/2020)  -unable to obtain CT for further delineation due to renal insufficiency  Nephrology following    Stage III CKD  -recently saw nephrology, with plans for re-visit later this week  -baseline Cr 1.1-1.3  -recent acute renal  insufficiency likely related to volume depletion for hydrochlorothiazide (now stopped)  -improved cr. To 1.5  (previously 1.9)    Hyperlipidemia  -on Atorvastatin 20 mg  -LDL 65 (at PMD 10/2020)    Type II Diabetes  -hgb A1C 6.2% (10/2020)      Orders Placed This Encounter   Procedures     Follow-Up with Cardiac Advanced Practice Provider       Orders Placed This Encounter   Medications     amLODIPine (NORVASC) 10 MG tablet     Sig: Take 1 tablet (10 mg) by mouth every evening     Dispense:  90 tablet     Refill:  1       Medications Discontinued During This Encounter   Medication Reason     Calcium-Magnesium-Zinc 333-133-5 MG TABS per tablet Therapy completed     diclofenac (VOLTAREN) 1 % topical gel Therapy completed     diphenoxylate-atropine (LOMOTIL) 2.5-0.025 MG tablet Therapy completed     irbesartan (AVAPRO) 150 MG tablet Therapy completed     metFORMIN (GLUCOPHAGE-XR) 500 MG 24 hr tablet Therapy completed     amLODIPine (NORVASC) 5 MG tablet          Encounter Diagnosis   Name Primary?     Essential hypertension        CURRENT MEDICATIONS:  Current Outpatient Medications   Medication Sig Dispense Refill     albuterol (PROAIR HFA/PROVENTIL HFA/VENTOLIN HFA) 108 (90 Base) MCG/ACT inhaler Inhale 2 puffs into the lungs every 4 hours as needed for shortness of breath / dyspnea or wheezing       amLODIPine (NORVASC) 10 MG tablet Take 1 tablet (10 mg) by mouth every evening 90 tablet 1     aspirin (ASA) 81 MG tablet Take 1 tablet (81 mg) by mouth daily       atorvastatin (LIPITOR) 20 MG tablet Take 20 mg by mouth At Bedtime       famotidine (PEPCID) 20 MG tablet Take 20 mg by mouth daily        fexofenadine-pseudoePHEDrine (ALLEGRA-D)  MG 12 hr tablet Take 1 tablet by mouth 2 times daily Seasonal use       fluocinonide (LIDEX) 0.05 % external solution        fluticasone (FLONASE) 50 MCG/ACT nasal spray Spray 2 sprays into both nostrils daily as needed        ketoconazole (NIZORAL) 2 % external cream APPLY TO  AFFECTED AREA EVERY DAY       LORAZEPAM PO Take 0.5 mg by mouth nightly as needed for anxiety        metoprolol succinate ER (TOPROL-XL) 25 MG 24 hr tablet Take 1 tablet (25 mg) by mouth daily 90 tablet 0     Multiple Vitamins-Minerals (MULTI FOR HER PO) Take by mouth daily Plus omega-3       nitroGLYcerin (NITROSTAT) 0.4 MG sublingual tablet For chest pain place 1 tablet under the tongue every 5 minutes for 3 doses. If symptoms persist 5 minutes after 2nd dose call 911. 30 tablet 1     olopatadine (PATADAY) 0.2 % ophthalmic solution Place 1 drop into both eyes daily as needed       traZODone (DESYREL) 100 MG tablet Take 50 mg by mouth At Bedtime        venlafaxine (EFFEXOR-XR) 150 MG 24 hr capsule Take 150 mg by mouth daily         ALLERGIES     Allergies   Allergen Reactions     Amoxicillin-Pot Clavulanate GI Disturbance     Lisinopril Cough       PAST MEDICAL HISTORY:  Past Medical History:   Diagnosis Date     Anxiety      Arthritis      BPPV (benign paroxysmal positional vertigo)      Chronic insomnia 11/29/2016    ativan at bedtime since knee surgery in [id number] - discussed not rec long term - dependency risk.  Trial of trazodone 8/2016 caused paradoxical effect and jittery, however it tolerating well 100 mg 1/2018 -6/18/2018 and effective.     Chronic seasonal allergic rhinitis due to fungal spores 9/17/2015    allegra-D 12 hour, OTC flonase     CKD (chronic kidney disease) stage 3, GFR 30-59 ml/min 6/23/2016    losartan     Controlled type 2 diabetes mellitus with stage 3 chronic kidney disease, without long-term current use of insulin (H) 9/11/2015    Prior Metformin XR hypoglycemia, XR stomach upset - tolerating Metformin   mg BID fall 2015 -> c/o GI so only taking 1/2 pill in the evening  as of 4/2016     Coronary artery disease involving native coronary artery of native heart without angina pectoris 9/17/2015    S/p CABG 1999, on ASA, Bblocker, statin, ARB. 6/2019 6/2019 Cor angio for unstable  angina 1. Chronically occluded LIMA to LAD and moderate ostial/proximal LAD disease (non-flow limiting) 2. Moderately elevated left heart filling pressure (LVEDP 22 mmHg)     GERD (gastroesophageal reflux disease)      HLD (hyperlipidemia)      Hypertension      IBS (irritable bowel syndrome)      Obstructive sleep apnea 2015 reported never got a CPAP machine.  Setting: APAP 5-12 Supplied by: [facility] PSG done: 11/10/2015 AHI 5 RDI 8 Lowest O2 Sat: 80% New 2015       PAST SURGICAL HISTORY:  Past Surgical History:   Procedure Laterality Date     CARDIAC SURGERY       CV ANGIOGRAM CORONARY GRAFT N/A 2019    Procedure: Angiogram Coronary Graft;  Surgeon: Hayden Muñiz MD;  Location:  HEART CARDIAC CATH LAB     CV INSTANTANEOUS WAVE-FREE RATIO N/A 2019    Procedure: Instantaneous Wave-Free Ratio;  Surgeon: Hayden Muñiz MD;  Location:  HEART CARDIAC CATH LAB     CV LEFT HEART CATH N/A 2019    Procedure: Left Heart Cath;  Surgeon: Hayden Muñiz MD;  Location: [facility]     EYE SURGERY       ORTHOPEDIC SURGERY         FAMILY HISTORY:  Family History   Problem Relation Age of Onset     Arthritis Mother      Coronary Artery Disease Father      Cancer Father        SOCIAL HISTORY:  Social History     Socioeconomic History     Marital status: Single     Spouse name: None     Number of children: 1     Years of education: None     Highest education level: None   Occupational History     Occupation: retired   Social Needs     Financial resource strain: None     Food insecurity     Worry: None     Inability: None     Transportation needs     Medical: None     Non-medical: None   Tobacco Use     Smoking status: Former Smoker     Start date:      Quit date:      Years since quittin.0     Smokeless tobacco: Never Used   Substance and Sexual Activity     Alcohol use: Yes     Drug use: No     Sexual activity: None   Lifestyle     Physical activity      "Days per week: None     Minutes per session: None     Stress: None   Relationships     Social connections     Talks on phone: None     Gets together: None     Attends Restoration service: None     Active member of club or organization: None     Attends meetings of clubs or organizations: None     Relationship status: None     Intimate partner violence     Fear of current or ex partner: None     Emotionally abused: None     Physically abused: None     Forced sexual activity: None   Other Topics Concern     None   Social History Narrative    Retired, lives in a senior apartment.         Review of Systems:  Skin:  Positive for itching     Eyes:  Positive for glasses reading glasses  ENT:  Positive for postnasal drainage;nasal congestion    Respiratory:  Positive for shortness of breath SOB when laying down once in a while   Cardiovascular:    palpitations;Positive for    Gastroenterology: Positive for reflux controlled with meds  Genitourinary:  Positive for urinary frequency    Musculoskeletal:  Positive for back pain;arthritis;joint pain;neck pain hand and feet  pain  Neurologic:  Positive for numbness or tingling of hands;numbness or tingling of feet ahnds and feet a little numb  Psychiatric:  Positive for excessive stress;anxiety;depression    Heme/Lymph/Imm:  Negative      Endocrine:  Positive for diabetes      Physical Exam:  Vitals: BP (!) 183/83 (BP Location: Right arm, Patient Position: Sitting, Cuff Size: Adult Regular)   Pulse 62   Ht 1.626 m (5' 4\")   Wt 76.2 kg (168 lb)   SpO2 97%   BMI 28.84 kg/m      Constitutional:  cooperative;well nourished overweight      Skin:  warm and dry to the touch          Head:  normocephalic        Eyes:  pupils equal and round        Lymph:      ENT:  no pallor or cyanosis        Neck:  JVP normal;no carotid bruit        Respiratory:  clear to auscultation;normal respiratory excursion         Cardiac: regular rhythm;normal S1 and S2     no presence of murmur        "   pulses full and equal                                        GI:  abdomen soft obese      Extremities and Muscular Skeletal:  no edema              Neurological:  no gross motor deficits;affect appropriate        Psych:  Alert and Oriented x 3        Thank you for allowing me to participate in the care of your patient.    Sincerely,     SKYE Guzman [facility]

## 2021-01-24 ENCOUNTER — HEALTH MAINTENANCE LETTER (OUTPATIENT)
Age: 76
End: 2021-01-24

## 2021-03-09 DIAGNOSIS — I10 ESSENTIAL HYPERTENSION: ICD-10-CM

## 2021-03-09 RX ORDER — AMLODIPINE BESYLATE 10 MG/1
10 TABLET ORAL EVERY EVENING
Qty: 90 TABLET | Refills: 0 | Status: SHIPPED | OUTPATIENT
Start: 2021-03-09 | End: 2023-05-11

## 2021-05-15 ENCOUNTER — HEALTH MAINTENANCE LETTER (OUTPATIENT)
Age: 76
End: 2021-05-15

## 2021-06-29 ENCOUNTER — OFFICE VISIT (OUTPATIENT)
Dept: CARDIOLOGY | Facility: CLINIC | Age: 76
End: 2021-06-29
Payer: COMMERCIAL

## 2021-06-29 VITALS
BODY MASS INDEX: 30.21 KG/M2 | HEART RATE: 52 BPM | DIASTOLIC BLOOD PRESSURE: 65 MMHG | WEIGHT: 176 LBS | SYSTOLIC BLOOD PRESSURE: 107 MMHG | OXYGEN SATURATION: 97 %

## 2021-06-29 DIAGNOSIS — I70.1 RENAL ARTERY STENOSIS (H): Primary | ICD-10-CM

## 2021-06-29 DIAGNOSIS — M79.89 LEG SWELLING: ICD-10-CM

## 2021-06-29 PROCEDURE — 99204 OFFICE O/P NEW MOD 45 MIN: CPT | Performed by: INTERNAL MEDICINE

## 2021-06-29 NOTE — PROGRESS NOTES
Vascular Cardiology Consultation      HPI:     This is a 76 year old female with history of hypertension, coronary artery disease, diabetes, chronic kidney disease, hyperlipidemia, general anxiety disorder, irritable bowel syndrome and MGUS. She had prior stent to LAD and then she eventually underwent single vessel CABG (1999) and received a LIMA to her LAD.       Coronary angiogram (6/2019) showed that her LIMA to LAD was occluded but that her stent in her native LAD was patent with 50% stenosis and not found to be flow limiting.  A follow up stress echo did not show any ischemia in the anterior wall.    SHe has longstanding HTN and labile SBP to t he 200s and in secondary work up had renal ultrasound showing tortuous right renal artery and elevated velocities (I reviewed images myself). However a different renal ultrasound showed inconclusive results. Her aortic velocites were also elevated on my review which can underestimate the RAR and peak systolic velocities but can lead to clinical renal artery stenosis due to low flow. She has no claudication but gets leg swelling, left more than right.     Her Cr is stable between 1.6-1.9 and she follows nephrology.      ROS negative for migraines, but she has fatigue and dizziness. She denies chest pain or shortness of breath.     ASSESSMENT/PLAN:    1. Concern for aorta stenosis and loss of flow to kidneys (proximal to renal artery bifurcation). Need to evaluate middle aortic elevated velcoties and renal arteries. If there is flow limitation to kidneys this can be treated and may impove Cr. Adequate hydration and lab monitoring around CT. Alternatively can discuss with radiology a noncontrast MRA.    2. Hypertension is well controlled today, cont current meds    3. Echocardiogram once a year has been up to date    4. Follow up 6 months with me    5. Leg swelling - DVT rule out, eval with ultrasound.     Jesusita Mcdaniel MD MSc  [facility]       PAST  MEDICAL HISTORY  Past Medical History:   Diagnosis Date     Anxiety      Arthritis      BPPV (benign paroxysmal positional vertigo)      Chronic insomnia 11/29/2016    ativan at bedtime since knee surgery in [id number] - discussed not rec long term - dependency risk.  Trial of trazodone 8/2016 caused paradoxical effect and jittery, however it tolerating well 100 mg 1/2018 -6/18/2018 and effective.     Chronic seasonal allergic rhinitis due to fungal spores 9/17/2015    allegra-D 12 hour, OTC flonase     CKD (chronic kidney disease) stage 3, GFR 30-59 ml/min 6/23/2016    losartan     Controlled type 2 diabetes mellitus with stage 3 chronic kidney disease, without long-term current use of insulin (H) 9/11/2015    Prior Metformin XR hypoglycemia, XR stomach upset - tolerating Metformin   mg BID fall 2015 -> c/o GI so only taking 1/2 pill in the evening  as of 4/2016     Coronary artery disease involving native coronary artery of native heart without angina pectoris 9/17/2015    S/p CABG 1999, on ASA, Bblocker, statin, ARB. 6/2019 6/2019 Cor angio for unstable angina 1. Chronically occluded LIMA to LAD and moderate ostial/proximal LAD disease (non-flow limiting) 2. Moderately elevated left heart filling pressure (LVEDP 22 mmHg)     GERD (gastroesophageal reflux disease)      HLD (hyperlipidemia)      Hypertension      IBS (irritable bowel syndrome)      Obstructive sleep apnea 11/30/2015 11/29/2016 reported never got a CPAP machine.  Setting: APAP 5-12 Supplied by: [facility] PSG done: 11/10/2015 AHI 5 RDI 8 Lowest O2 Sat: 80% New 11/25/2015       CURRENT MEDICATIONS  Current Outpatient Medications   Medication Sig Dispense Refill     albuterol (PROAIR HFA/PROVENTIL HFA/VENTOLIN HFA) 108 (90 Base) MCG/ACT inhaler Inhale 2 puffs into the lungs every 4 hours as needed for shortness of breath / dyspnea or wheezing       amLODIPine (NORVASC) 10 MG tablet Take 1 tablet (10 mg) by mouth every evening 90 tablet 0     aspirin  (ASA) 81 MG tablet Take 1 tablet (81 mg) by mouth daily       atorvastatin (LIPITOR) 20 MG tablet Take 20 mg by mouth At Bedtime       famotidine (PEPCID) 20 MG tablet Take 20 mg by mouth daily        fexofenadine-pseudoePHEDrine (ALLEGRA-D)  MG 12 hr tablet Take 1 tablet by mouth 2 times daily Seasonal use       fluocinonide (LIDEX) 0.05 % external solution        fluticasone (FLONASE) 50 MCG/ACT nasal spray Spray 2 sprays into both nostrils daily as needed        ketoconazole (NIZORAL) 2 % external cream APPLY TO AFFECTED AREA EVERY DAY       LORAZEPAM PO Take 0.5 mg by mouth nightly as needed for anxiety        metoprolol succinate ER (TOPROL-XL) 25 MG 24 hr tablet Take 1 tablet (25 mg) by mouth daily 90 tablet 0     Multiple Vitamins-Minerals (MULTI FOR HER PO) Take by mouth daily Plus omega-3       olopatadine (PATADAY) 0.2 % ophthalmic solution Place 1 drop into both eyes daily as needed       traZODone (DESYREL) 100 MG tablet Take 50 mg by mouth At Bedtime        venlafaxine (EFFEXOR-XR) 150 MG 24 hr capsule Take 150 mg by mouth daily       nitroGLYcerin (NITROSTAT) 0.4 MG sublingual tablet For chest pain place 1 tablet under the tongue every 5 minutes for 3 doses. If symptoms persist 5 minutes after 2nd dose call 911. (Patient not taking: Reported on 6/29/2021) 30 tablet 1       PAST SURGICAL HISTORY:  Past Surgical History:   Procedure Laterality Date     CARDIAC SURGERY       CV ANGIOGRAM CORONARY GRAFT N/A 6/11/2019    Procedure: Angiogram Coronary Graft;  Surgeon: Hayden Muñiz MD;  Location:  HEART CARDIAC CATH LAB     CV INSTANTANEOUS WAVE-FREE RATIO N/A 6/11/2019    Procedure: Instantaneous Wave-Free Ratio;  Surgeon: Hayden Muñiz MD;  Location:  HEART CARDIAC CATH LAB     CV LEFT HEART CATH N/A 6/11/2019    Procedure: Left Heart Cath;  Surgeon: Hayden Muñiz MD;  Location:  HEART CARDIAC CATH LAB     EYE SURGERY       ORTHOPEDIC SURGERY         ALLERGIES     Allergies    Allergen Reactions     Amoxicillin-Pot Clavulanate GI Disturbance     Lisinopril Cough       FAMILY HISTORY  Family History   Problem Relation Age of Onset     Arthritis Mother      Coronary Artery Disease Father      Cancer Father          SOCIAL HISTORY  Social History     Socioeconomic History     Marital status: Single     Spouse name: Not on file     Number of children: 1     Years of education: Not on file     Highest education level: Not on file   Occupational History     Occupation: retired   Social Needs     Financial resource strain: Not on file     Food insecurity     Worry: Not on file     Inability: Not on file     Transportation needs     Medical: Not on file     Non-medical: Not on file   Tobacco Use     Smoking status: Former Smoker     Start date:      Quit date:      Years since quittin.5     Smokeless tobacco: Never Used   Substance and Sexual Activity     Alcohol use: Yes     Drug use: No     Sexual activity: Not on file   Lifestyle     Physical activity     Days per week: Not on file     Minutes per session: Not on file     Stress: Not on file   Relationships     Social connections     Talks on phone: Not on file     Gets together: Not on file     Attends Restorationist service: Not on file     Active member of club or organization: Not on file     Attends meetings of clubs or organizations: Not on file     Relationship status: Not on file     Intimate partner violence     Fear of current or ex partner: Not on file     Emotionally abused: Not on file     Physically abused: Not on file     Forced sexual activity: Not on file   Other Topics Concern     Not on file   Social History Narrative    Retired, lives in a senior apartment.         ROS:   Constitutional: No fever, chills, or sweats. No weight gain/loss   ENT: No visual disturbance, ear ache, epistaxis, sore throat  Allergies/Immunologic: Negative  Respiratory: No cough, hemoptysia  Cardiovascular: As per HPI  GI: No nausea,  vomiting, hematemesis, melena, or hematochezia  : No urinary frequency, dysuria, or hematuria  Integument: Negative  Psychiatric: Negative  Neuro: Negative  Endocrinology: Negative   Musculoskeletal: Negative  Vascular: No walking impairment, claudication, ischemic rest pain or nonhealing wounds    EXAM:  /65   Pulse 52   Wt 79.8 kg (176 lb)   SpO2 97%   BMI 30.21 kg/m    In general, the patient is a pleasant female in no apparent distress.    HEENT: NC/AT.  PERRLA.  EOMI.  Sclerae white, not injected   Neck: No adenopathy.  No thyromegaly. Carotids +2/2 bilaterally without bruits.  No jugular venous distension.   Heart: RRR. Normal S1, S2 splits physiologically. No murmur, rub, click, or gallop.   Lungs: CTA.  No ronchi, wheezes, rales.   Abdomen: Soft, nontender, nondistended.   Extremities: No clubbing, cyanosis, or edema.  No wounds. No varicose veins signs of chronic venous insufficiency.   Vascular: No bruits are noted.    Labs:  LIPID RESULTS:  No results found for: CHOL, HDL, LDL, TRIG, CHOLHDLRATIO, NHDL    LIVER ENZYME RESULTS:  No results found for: AST, ALT    CBC RESULTS:  Lab Results   Component Value Date    WBC 7.9 04/17/2020    RBC 4.73 04/17/2020    HGB 14.3 04/17/2020    HCT 46.4 04/17/2020    MCV 98 04/17/2020    MCH 30.2 04/17/2020    MCHC 30.8 (L) 04/17/2020    RDW 13.0 04/17/2020     04/17/2020       BMP RESULTS:  Lab Results   Component Value Date     01/12/2021    POTASSIUM 4.6 01/12/2021    CHLORIDE 109 01/12/2021    CO2 32 01/12/2021    ANIONGAP <1 (L) 01/12/2021     (H) 01/12/2021    BUN 33 (H) 01/12/2021    CR 1.58 (H) 01/12/2021    GFRESTIMATED 32 (L) 01/12/2021    GFRESTBLACK 37 (L) 01/12/2021    DORIE 8.8 01/12/2021        A1C RESULTS:  Lab Results   Component Value Date    A1C 6.2 (H) 06/09/2019

## 2021-06-29 NOTE — LETTER
6/29/2021    Madison MRasheeda Shivam  [address] [place] [address]    RE: Princess Edgar       Dear Colleague,    I had the pleasure of seeing Princess Edgar in the [facility].             Vascular Cardiology Consultation      HPI:     This is a 76 year old female with history of hypertension, coronary artery disease, diabetes, chronic kidney disease, hyperlipidemia, general anxiety disorder, irritable bowel syndrome and MGUS. She had prior stent to LAD and then she eventually underwent single vessel CABG (1999) and received a LIMA to her LAD.       Coronary angiogram (6/2019) showed that her LIMA to LAD was occluded but that her stent in her native LAD was patent with 50% stenosis and not found to be flow limiting.  A follow up stress echo did not show any ischemia in the anterior wall.    SHe has longstanding HTN and labile SBP to t he 200s and in secondary work up had renal ultrasound showing tortuous right renal artery and elevated velocities (I reviewed images myself). However a different renal ultrasound showed inconclusive results. Her aortic velocites were also elevated on my review which can underestimate the RAR and peak systolic velocities but can lead to clinical renal artery stenosis due to low flow. She has no claudication but gets leg swelling, left more than right.     Her Cr is stable between 1.6-1.9 and she follows nephrology.      ROS negative for migraines, but she has fatigue and dizziness. She denies chest pain or shortness of breath.     ASSESSMENT/PLAN:    1. Concern for aorta stenosis and loss of flow to kidneys (proximal to renal artery bifurcation). Need to evaluate middle aortic elevated velcoties and renal arteries. If there is flow limitation to kidneys this can be treated and may impove Cr. Adequate hydration and lab monitoring around CT. Alternatively can discuss with radiology a noncontrast MRA.    2.  Hypertension is well controlled today, cont current meds    3. Echocardiogram once a year has been up to date    4. Follow up 6 months with me    5. Leg swelling - DVT rule out, eval with ultrasound.     Jesusita Mcdaniel MD MSc  [facility]       PAST MEDICAL HISTORY  Past Medical History:   Diagnosis Date     Anxiety      Arthritis      BPPV (benign paroxysmal positional vertigo)      Chronic insomnia 11/29/2016    ativan at bedtime since knee surgery in [id number] - discussed not rec long term - dependency risk.  Trial of trazodone 8/2016 caused paradoxical effect and jittery, however it tolerating well 100 mg 1/2018 -6/18/2018 and effective.     Chronic seasonal allergic rhinitis due to fungal spores 9/17/2015    allegra-D 12 hour, OTC flonase     CKD (chronic kidney disease) stage 3, GFR 30-59 ml/min 6/23/2016    losartan     Controlled type 2 diabetes mellitus with stage 3 chronic kidney disease, without long-term current use of insulin (H) 9/11/2015    Prior Metformin XR hypoglycemia, XR stomach upset - tolerating Metformin   mg BID fall 2015 -> c/o GI so only taking 1/2 pill in the evening  as of 4/2016     Coronary artery disease involving native coronary artery of native heart without angina pectoris 9/17/2015    S/p CABG 1999, on ASA, Bblocker, statin, ARB. 6/2019 6/2019 Cor angio for unstable angina 1. Chronically occluded LIMA to LAD and moderate ostial/proximal LAD disease (non-flow limiting) 2. Moderately elevated left heart filling pressure (LVEDP 22 mmHg)     GERD (gastroesophageal reflux disease)      HLD (hyperlipidemia)      Hypertension      IBS (irritable bowel syndrome)      Obstructive sleep apnea 11/30/2015 11/29/2016 reported never got a CPAP machine.  Setting: APAP 5-12 Supplied by: [facility] PSG done: 11/10/2015 AHI 5 RDI 8 Lowest O2 Sat: 80% New 11/25/2015       CURRENT MEDICATIONS  Current Outpatient Medications   Medication Sig Dispense Refill     albuterol (PROAIR  HFA/PROVENTIL HFA/VENTOLIN HFA) 108 (90 Base) MCG/ACT inhaler Inhale 2 puffs into the lungs every 4 hours as needed for shortness of breath / dyspnea or wheezing       amLODIPine (NORVASC) 10 MG tablet Take 1 tablet (10 mg) by mouth every evening 90 tablet 0     aspirin (ASA) 81 MG tablet Take 1 tablet (81 mg) by mouth daily       atorvastatin (LIPITOR) 20 MG tablet Take 20 mg by mouth At Bedtime       famotidine (PEPCID) 20 MG tablet Take 20 mg by mouth daily        fexofenadine-pseudoePHEDrine (ALLEGRA-D)  MG 12 hr tablet Take 1 tablet by mouth 2 times daily Seasonal use       fluocinonide (LIDEX) 0.05 % external solution        fluticasone (FLONASE) 50 MCG/ACT nasal spray Spray 2 sprays into both nostrils daily as needed        ketoconazole (NIZORAL) 2 % external cream APPLY TO AFFECTED AREA EVERY DAY       LORAZEPAM PO Take 0.5 mg by mouth nightly as needed for anxiety        metoprolol succinate ER (TOPROL-XL) 25 MG 24 hr tablet Take 1 tablet (25 mg) by mouth daily 90 tablet 0     Multiple Vitamins-Minerals (MULTI FOR HER PO) Take by mouth daily Plus omega-3       olopatadine (PATADAY) 0.2 % ophthalmic solution Place 1 drop into both eyes daily as needed       traZODone (DESYREL) 100 MG tablet Take 50 mg by mouth At Bedtime        venlafaxine (EFFEXOR-XR) 150 MG 24 hr capsule Take 150 mg by mouth daily       nitroGLYcerin (NITROSTAT) 0.4 MG sublingual tablet For chest pain place 1 tablet under the tongue every 5 minutes for 3 doses. If symptoms persist 5 minutes after 2nd dose call 911. (Patient not taking: Reported on 6/29/2021) 30 tablet 1       PAST SURGICAL HISTORY:  Past Surgical History:   Procedure Laterality Date     CARDIAC SURGERY       CV ANGIOGRAM CORONARY GRAFT N/A 6/11/2019    Procedure: Angiogram Coronary Graft;  Surgeon: Hayden Muñiz MD;  Location:  HEART CARDIAC CATH LAB     CV INSTANTANEOUS WAVE-FREE RATIO N/A 6/11/2019    Procedure: Instantaneous Wave-Free Ratio;  Surgeon: Antonino  Hayden Hawley MD;  Location:  HEART CARDIAC CATH LAB     CV LEFT HEART CATH N/A 2019    Procedure: Left Heart Cath;  Surgeon: Hayden Muñiz MD;  Location: [facility]ART CARDIAC CATH LAB     EYE SURGERY       ORTHOPEDIC SURGERY         ALLERGIES     Allergies   Allergen Reactions     Amoxicillin-Pot Clavulanate GI Disturbance     Lisinopril Cough       FAMILY HISTORY  Family History   Problem Relation Age of Onset     Arthritis Mother      Coronary Artery Disease Father      Cancer Father          SOCIAL HISTORY  Social History     Socioeconomic History     Marital status: Single     Spouse name: Not on file     Number of children: 1     Years of education: Not on file     Highest education level: Not on file   Occupational History     Occupation: retired   Social Needs     Financial resource strain: Not on file     Food insecurity     Worry: Not on file     Inability: Not on file     Transportation needs     Medical: Not on file     Non-medical: Not on file   Tobacco Use     Smoking status: Former Smoker     Start date:      Quit date:      Years since quittin.5     Smokeless tobacco: Never Used   Substance and Sexual Activity     Alcohol use: Yes     Drug use: No     Sexual activity: Not on file   Lifestyle     Physical activity     Days per week: Not on file     Minutes per session: Not on file     Stress: Not on file   Relationships     Social connections     Talks on phone: Not on file     Gets together: Not on file     Attends Church service: Not on file     Active member of club or organization: Not on file     Attends meetings of clubs or organizations: Not on file     Relationship status: Not on file     Intimate partner violence     Fear of current or ex partner: Not on file     Emotionally abused: Not on file     Physically abused: Not on file     Forced sexual activity: Not on file   Other Topics Concern     Not on file   Social History Narrative    Retired, lives in a senior  apartment.         ROS:   Constitutional: No fever, chills, or sweats. No weight gain/loss   ENT: No visual disturbance, ear ache, epistaxis, sore throat  Allergies/Immunologic: Negative  Respiratory: No cough, hemoptysia  Cardiovascular: As per HPI  GI: No nausea, vomiting, hematemesis, melena, or hematochezia  : No urinary frequency, dysuria, or hematuria  Integument: Negative  Psychiatric: Negative  Neuro: Negative  Endocrinology: Negative   Musculoskeletal: Negative  Vascular: No walking impairment, claudication, ischemic rest pain or nonhealing wounds    EXAM:  /65   Pulse 52   Wt 79.8 kg (176 lb)   SpO2 97%   BMI 30.21 kg/m    In general, the patient is a pleasant female in no apparent distress.    HEENT: NC/AT.  PERRLA.  EOMI.  Sclerae white, not injected   Neck: No adenopathy.  No thyromegaly. Carotids +2/2 bilaterally without bruits.  No jugular venous distension.   Heart: RRR. Normal S1, S2 splits physiologically. No murmur, rub, click, or gallop.   Lungs: CTA.  No ronchi, wheezes, rales.   Abdomen: Soft, nontender, nondistended.   Extremities: No clubbing, cyanosis, or edema.  No wounds. No varicose veins signs of chronic venous insufficiency.   Vascular: No bruits are noted.    Labs:  LIPID RESULTS:  No results found for: CHOL, HDL, LDL, TRIG, CHOLHDLRATIO, NHDL    LIVER ENZYME RESULTS:  No results found for: AST, ALT    CBC RESULTS:  Lab Results   Component Value Date    WBC 7.9 04/17/2020    RBC 4.73 04/17/2020    HGB 14.3 04/17/2020    HCT 46.4 04/17/2020    MCV 98 04/17/2020    MCH 30.2 04/17/2020    MCHC 30.8 (L) 04/17/2020    RDW 13.0 04/17/2020     04/17/2020       BMP RESULTS:  Lab Results   Component Value Date     01/12/2021    POTASSIUM 4.6 01/12/2021    CHLORIDE 109 01/12/2021    CO2 32 01/12/2021    ANIONGAP <1 (L) 01/12/2021     (H) 01/12/2021    BUN 33 (H) 01/12/2021    CR 1.58 (H) 01/12/2021    GFRESTIMATED 32 (L) 01/12/2021    GFRESTBLACK 37 (L) 01/12/2021     DORIE 8.8 01/12/2021        A1C RESULTS:  Lab Results   Component Value Date    A1C 6.2 (H) 06/09/2019           Thank you for allowing me to participate in the care of your patient.      Sincerely,     Jesusita Mcdaniel MD     [facility]  cc:   No referring provider defined for this encounter.

## 2021-06-29 NOTE — PATIENT INSTRUCTIONS
1. CT angiogram abdomen/pelvis and leg ultrasound in [place]  2. Follow up with Dr. Mcdaniel in 6 months

## 2021-06-30 ENCOUNTER — CARE COORDINATION (OUTPATIENT)
Dept: CARDIOLOGY | Facility: CLINIC | Age: 76
End: 2021-06-30

## 2021-06-30 NOTE — PROGRESS NOTES
Call from CT department regarding patient scheduled on 7/8/21 for a CTA abdomen/pelvis. Patient has had a progressive worsening in her GFR and per CT protocol, patient won't be able to receive CT contrast for a GFR <40. Patient currently has a GFR of 29 based on lab work in [facility]. CT department wondering if Dr. Mcdaniel would be willing to change this to an alternate test or if patient needs CTA, if patient will need hydration and a BMP prior to testing. Will route to Dr. Mcdaniel for review.           Future Appointments   Date Time Provider Department Center   7/8/2021  1:00 PM SCICT1 SHCVCT CVIMG   7/8/2021  1:30 PM SUVUS1 SUUMVA UMP PSA CLIN        Last OV 6/29/21 w/ Dr. Mcdaniel:  1. CT angiogram abdomen/pelvis and leg ultrasound in [facility]  2. Follow up with Dr. Mcdaniel in 6 months       JEN Ace June 30, 2021 11:06 AM

## 2021-07-07 ENCOUNTER — TELEPHONE (OUTPATIENT)
Dept: CARDIOLOGY | Facility: CLINIC | Age: 76
End: 2021-07-07

## 2021-07-07 DIAGNOSIS — I70.1 RENAL ARTERY STENOSIS (H): Primary | ICD-10-CM

## 2021-07-07 NOTE — PROGRESS NOTES
Jesusita Mcdaniel MD Cowling, Elizabeth, RN Just now (4:42 PM)     Discussed today.   Will plan for MRA with time of flight at the [place].     Dr. Mcdaniel    Message text      See telephone encounter 7/7/21. Patient aware.

## 2021-07-07 NOTE — TELEPHONE ENCOUNTER
Patient scheduled for CT scan tomorrow, but per protocol CT scan cannot be done without extra fluids. Spoke with Dr. Mcdaniel, who advises patient to cancel CT scan, and [facility] at that location. Spoke with patient and reviewed plan. Patient verbalized understanding and agreed with plan of care. CT scan for tomorrow cancelled, and advised patient that we will be in touch with her regarding a plan moving forward.

## 2021-07-07 NOTE — TELEPHONE ENCOUNTER
"Dr. Mendoza advised to order MRA abdomen/pelvis with comments \"time of flight no contrast per discussion with Dr. Goddard\"    RN placed order and called patient with updated. Patient verbalized understanding and is in agreement with plan. RN provided patient with direct phone number for scheduling at the  to call and arrange. Patient will call us with any further questions/concerns.   "

## 2021-07-08 ENCOUNTER — ANCILLARY PROCEDURE (OUTPATIENT)
Dept: VASCULAR ULTRASOUND | Facility: CLINIC | Age: 76
End: 2021-07-08
Attending: INTERNAL MEDICINE
Payer: COMMERCIAL

## 2021-07-08 DIAGNOSIS — M79.89 LEG SWELLING: ICD-10-CM

## 2021-07-08 PROCEDURE — 93970 EXTREMITY STUDY: CPT | Performed by: INTERNAL MEDICINE

## 2021-07-12 NOTE — TELEPHONE ENCOUNTER
RN called [facility] MRI imaging scheduling department to inquire about status of MRA orders/scheduling patient. MRI scheduling advised RN they would reach out to patient today to schedule.

## 2021-08-04 ENCOUNTER — ANCILLARY PROCEDURE (OUTPATIENT)
Dept: MRI IMAGING | Facility: CLINIC | Age: 76
End: 2021-08-04
Attending: INTERNAL MEDICINE
Payer: COMMERCIAL

## 2021-08-04 DIAGNOSIS — I70.1 RENAL ARTERY STENOSIS (H): ICD-10-CM

## 2021-08-04 PROCEDURE — 74185 MRA ABD W OR W/O CNTRST: CPT | Mod: GC | Performed by: RADIOLOGY

## 2021-08-10 ENCOUNTER — CARE COORDINATION (OUTPATIENT)
Dept: CARDIOLOGY | Facility: CLINIC | Age: 76
End: 2021-08-10

## 2021-09-04 ENCOUNTER — HEALTH MAINTENANCE LETTER (OUTPATIENT)
Age: 76
End: 2021-09-04

## 2021-12-25 ENCOUNTER — HEALTH MAINTENANCE LETTER (OUTPATIENT)
Age: 76
End: 2021-12-25

## 2022-02-19 ENCOUNTER — HEALTH MAINTENANCE LETTER (OUTPATIENT)
Age: 77
End: 2022-02-19

## 2022-04-16 ENCOUNTER — HEALTH MAINTENANCE LETTER (OUTPATIENT)
Age: 77
End: 2022-04-16

## 2022-05-17 ENCOUNTER — OFFICE VISIT (OUTPATIENT)
Dept: CARDIOLOGY | Facility: CLINIC | Age: 77
End: 2022-05-17
Attending: INTERNAL MEDICINE
Payer: COMMERCIAL

## 2022-05-17 VITALS
OXYGEN SATURATION: 96 % | SYSTOLIC BLOOD PRESSURE: 120 MMHG | BODY MASS INDEX: 31.41 KG/M2 | HEART RATE: 53 BPM | WEIGHT: 184 LBS | HEIGHT: 64 IN | DIASTOLIC BLOOD PRESSURE: 70 MMHG

## 2022-05-17 DIAGNOSIS — I70.1 RENAL ARTERY STENOSIS (H): ICD-10-CM

## 2022-05-17 DIAGNOSIS — R06.02 SOB (SHORTNESS OF BREATH): ICD-10-CM

## 2022-05-17 DIAGNOSIS — I25.10 CORONARY ARTERY DISEASE INVOLVING NATIVE CORONARY ARTERY OF NATIVE HEART WITHOUT ANGINA PECTORIS: Primary | ICD-10-CM

## 2022-05-17 PROCEDURE — 99213 OFFICE O/P EST LOW 20 MIN: CPT | Performed by: INTERNAL MEDICINE

## 2022-05-17 NOTE — PROGRESS NOTES
HPI:     This is a 77 year old female with history of hypertension, coronary artery disease, diabetes, chronic kidney disease, hyperlipidemia, general anxiety disorder, irritable bowel syndrome and MGUS. She had prior stent to LAD and then she eventually underwent single vessel CABG (1999) and received a LIMA to her LAD.       Coronary angiogram (6/2019) showed that her LIMA to LAD was occluded but that her stent in her native LAD was patent with 50% stenosis and not found to be flow limiting.  A follow up stress echo did not show any ischemia in the anterior wall.     She had longstanding HTN and labile SBP to the 200s and in secondary work up had renal ultrasound showing tortuous right renal artery and elevated velocities (I reviewed images myself). However a different renal ultrasound showed inconclusive results. Her aortic velocites were also elevated on my review which can underestimate the RAR and peak systolic velocities but can lead to clinical renal artery stenosis due to low flow. She has no claudication but gets leg swelling, left more than right. I tried to obtain a CT and MR but she had YAMILET and her creatinine has not quite returned back to normal. It however has improved and is now 1.6. We decided to defer on any contrast imaging at this time given her improved BPs and her MRA without contrast did not show obvious evidence of renal stenosis.      ROS negative for migraines, but she has fatigue and dizziness. She denies chest pain. +occasional shortness of breath.     No change in medications. Under some stress, son gets  this weekend in [place] and her mom is sick and dying unfortunately.         ASSESSMENT/PLAN:     1. Concern for aorta stenosis and loss of flow to kidneys (proximal to renal artery bifurcation).  -Chemistry function: GFR 36, Cr 1.5, elevated but down from 1.9   -will obtain aortoiliac duplex ultrasound in one year as well as surveillance renal ultrasound to screen for  limited flow to kidneys  -unable to get contrast study      2. Hypertension is well controlled today, cont current meds     3. Echocardiogram for shortness of breath     4. Leg swelling: negative ultrasound of legs, likely vein reflux. Continue compression stockings.     Follow up in one year      Jesusita Mcdaniel MD MSc  [facility]     PAST MEDICAL HISTORY  Past Medical History:   Diagnosis Date     Anxiety      Arthritis      BPPV (benign paroxysmal positional vertigo)      Chronic insomnia 11/29/2016    ativan at bedtime since knee surgery in [id number] - discussed not rec long term - dependency risk.  Trial of trazodone 8/2016 caused paradoxical effect and jittery, however it tolerating well 100 mg 1/2018 -6/18/2018 and effective.     Chronic seasonal allergic rhinitis due to fungal spores 9/17/2015    allegra-D 12 hour, OTC flonase     CKD (chronic kidney disease) stage 3, GFR 30-59 ml/min (H) 6/23/2016    losartan     Controlled type 2 diabetes mellitus with stage 3 chronic kidney disease, without long-term current use of insulin (H) 9/11/2015    Prior Metformin XR hypoglycemia, XR stomach upset - tolerating Metformin   mg BID fall 2015 -> c/o GI so only taking 1/2 pill in the evening  as of 4/2016     Coronary artery disease involving native coronary artery of native heart without angina pectoris 9/17/2015    S/p CABG 1999, on ASA, Bblocker, statin, ARB. 6/2019 6/2019 Cor angio for unstable angina 1. Chronically occluded LIMA to LAD and moderate ostial/proximal LAD disease (non-flow limiting) 2. Moderately elevated left heart filling pressure (LVEDP 22 mmHg)     GERD (gastroesophageal reflux disease)      HLD (hyperlipidemia)      Hypertension      IBS (irritable bowel syndrome)      Obstructive sleep apnea 11/30/2015 11/29/2016 reported never got a CPAP machine.  Setting: APAP 5-12 Supplied by: [facility] PSG done: 11/10/2015 AHI 5 RDI 8 Lowest O2 Sat: 80% New 11/25/2015       CURRENT  MEDICATIONS  Current Outpatient Medications   Medication Sig Dispense Refill     albuterol (PROAIR HFA/PROVENTIL HFA/VENTOLIN HFA) 108 (90 Base) MCG/ACT inhaler Inhale 2 puffs into the lungs every 4 hours as needed for shortness of breath / dyspnea or wheezing       amLODIPine (NORVASC) 10 MG tablet Take 1 tablet (10 mg) by mouth every evening 90 tablet 0     aspirin (ASA) 81 MG tablet Take 1 tablet (81 mg) by mouth daily       atorvastatin (LIPITOR) 20 MG tablet Take 20 mg by mouth At Bedtime       famotidine (PEPCID) 20 MG tablet Take 20 mg by mouth daily        fexofenadine-pseudoePHEDrine (ALLEGRA-D)  MG 12 hr tablet Take 1 tablet by mouth 2 times daily Seasonal use       fluocinonide (LIDEX) 0.05 % external solution        fluticasone (FLONASE) 50 MCG/ACT nasal spray Spray 2 sprays into both nostrils daily as needed        ketoconazole (NIZORAL) 2 % external cream APPLY TO AFFECTED AREA EVERY DAY       LORAZEPAM PO Take 0.5 mg by mouth nightly as needed for anxiety        metoprolol succinate ER (TOPROL-XL) 25 MG 24 hr tablet Take 1 tablet (25 mg) by mouth daily 90 tablet 0     Multiple Vitamins-Minerals (MULTI FOR HER PO) Take by mouth daily Plus omega-3       olopatadine (PATADAY) 0.2 % ophthalmic solution Place 1 drop into both eyes daily as needed       venlafaxine (EFFEXOR-XR) 150 MG 24 hr capsule Take 150 mg by mouth daily       nitroGLYcerin (NITROSTAT) 0.4 MG sublingual tablet For chest pain place 1 tablet under the tongue every 5 minutes for 3 doses. If symptoms persist 5 minutes after 2nd dose call 911. (Patient not taking: No sig reported) 30 tablet 1     traZODone (DESYREL) 100 MG tablet Take 50 mg by mouth At Bedtime          PAST SURGICAL HISTORY:  Past Surgical History:   Procedure Laterality Date     CARDIAC SURGERY       CV ANGIOGRAM CORONARY GRAFT N/A 6/11/2019    Procedure: Angiogram Coronary Graft;  Surgeon: Hayden Muñiz MD;  Location:  HEART CARDIAC CATH LAB     CV  "INSTANTANEOUS WAVE-FREE RATIO N/A 2019    Procedure: Instantaneous Wave-Free Ratio;  Surgeon: Hayden Muñiz MD;  Location: [facility]ARD[facility]     CV LEFT HEART CATH N/A 2019    Procedure: Left Heart Cath;  Surgeon: Hayden Muñiz MD;  Location: [facility]ARD[facility]     EYE SURGERY       ORTHOPEDIC SURGERY         ALLERGIES     Allergies   Allergen Reactions     Amoxicillin-Pot Clavulanate GI Disturbance     Lisinopril Cough       FAMILY HISTORY  Family History   Problem Relation Age of Onset     Arthritis Mother      Coronary Artery Disease Father      Cancer Father        SOCIAL HISTORY  Social History     Socioeconomic History     Marital status: Single     Spouse name: Not on file     Number of children: 1     Years of education: Not on file     Highest education level: Not on file   Occupational History     Occupation: retired   Tobacco Use     Smoking status: Former Smoker     Start date:      Quit date:      Years since quittin.3     Smokeless tobacco: Never Used   Substance and Sexual Activity     Alcohol use: Yes     Drug use: No     Sexual activity: Not on file   Other Topics Concern     Not on file   Social History Narrative    Retired, lives in a senior apartment.       Social Determinants of Health     Financial Resource Strain: Not on file   Food Insecurity: Not on file   Transportation Needs: Not on file   Physical Activity: Not on file   Stress: Not on file   Social Connections: Not on file   Intimate Partner Violence: Not on file   Housing Stability: Not on file       ROS:   See hpi    EXAM:  /70   Pulse 53   Ht 1.626 m (5' 4\")   Wt 83.5 kg (184 lb)   SpO2 96%   BMI 31.58 kg/m    In general, the patient is a pleasant female in no apparent distress.    HEENT: NC/AT.  PERRLA.  EOMI.  Sclerae white, not injected.    Neck: No adenopathy.  No thyromegaly. Carotids +2/2 bilaterally without bruits.  No jugular venous distension.   Heart: RRR. Normal S1, " S2 splits physiologically. No murmur, rub, click, or gallop.   Lungs: CTA.  No ronchi, wheezes, rales.  No dullness to percussion.   Abdomen: Soft, nontender, nondistended.  Extremities: No clubbing, cyanosis, or edema.    Vascular: No bruits are noted.    Labs:  LIPID RESULTS:  No results found for: CHOL, HDL, LDL, TRIG, CHOLHDLRATIO, NHDL    LIVER ENZYME RESULTS:  No results found for: AST, ALT    CBC RESULTS:  Lab Results   Component Value Date    WBC 7.9 04/17/2020    RBC 4.73 04/17/2020    HGB 14.3 04/17/2020    HCT 46.4 04/17/2020    MCV 98 04/17/2020    MCH 30.2 04/17/2020    MCHC 30.8 (L) 04/17/2020    RDW 13.0 04/17/2020     04/17/2020       BMP RESULTS:  Lab Results   Component Value Date     01/12/2021    POTASSIUM 4.6 01/12/2021    CHLORIDE 109 01/12/2021    CO2 32 01/12/2021    ANIONGAP <1 (L) 01/12/2021     (H) 01/12/2021    BUN 33 (H) 01/12/2021    CR 1.58 (H) 01/12/2021    GFRESTIMATED 32 (L) 01/12/2021    GFRESTBLACK 37 (L) 01/12/2021    DORIE 8.8 01/12/2021        A1C RESULTS:  Lab Results   Component Value Date    A1C 6.2 (H) 06/09/2019

## 2022-05-17 NOTE — LETTER
5/17/2022    [facility] [address] [place] [address]    RE: Princess Edgar       Dear Colleague,     I had the pleasure of seeing Princess Edgar in the [facility].        HPI:     This is a 77 year old female with history of hypertension, coronary artery disease, diabetes, chronic kidney disease, hyperlipidemia, general anxiety disorder, irritable bowel syndrome and MGUS. She had prior stent to LAD and then she eventually underwent single vessel CABG (1999) and received a LIMA to her LAD.       Coronary angiogram (6/2019) showed that her LIMA to LAD was occluded but that her stent in her native LAD was patent with 50% stenosis and not found to be flow limiting.  A follow up stress echo did not show any ischemia in the anterior wall.     She had longstanding HTN and labile SBP to the 200s and in secondary work up had renal ultrasound showing tortuous right renal artery and elevated velocities (I reviewed images myself). However a different renal ultrasound showed inconclusive results. Her aortic velocites were also elevated on my review which can underestimate the RAR and peak systolic velocities but can lead to clinical renal artery stenosis due to low flow. She has no claudication but gets leg swelling, left more than right. I tried to obtain a CT and MR but she had YAMILET and her creatinine has not quite returned back to normal. It however has improved and is now 1.6. We decided to defer on any contrast imaging at this time given her improved BPs and her MRA without contrast did not show obvious evidence of renal stenosis.      ROS negative for migraines, but she has fatigue and dizziness. She denies chest pain. +occasional shortness of breath.     No change in medications. Under some stress, son gets  this weekend in [place] and her mom is sick and dying unfortunately.         ASSESSMENT/PLAN:     1. Concern for aorta stenosis and loss of flow to kidneys  (proximal to renal artery bifurcation).  -Chemistry function: GFR 36, Cr 1.5, elevated but down from 1.9   -will obtain aortoiliac duplex ultrasound in one year as well as surveillance renal ultrasound to screen for limited flow to kidneys  -unable to get contrast study      2. Hypertension is well controlled today, cont current meds     3. Echocardiogram for shortness of breath     4. Leg swelling: negative ultrasound of legs, likely vein reflux. Continue compression stockings.     Follow up in one year      Jesusita Mcdaniel MD MSc  [facility]     PAST MEDICAL HISTORY  Past Medical History:   Diagnosis Date     Anxiety      Arthritis      BPPV (benign paroxysmal positional vertigo)      Chronic insomnia 11/29/2016    ativan at bedtime since knee surgery in [id number] - discussed not rec long term - dependency risk.  Trial of trazodone 8/2016 caused paradoxical effect and jittery, however it tolerating well 100 mg 1/2018 -6/18/2018 and effective.     Chronic seasonal allergic rhinitis due to fungal spores 9/17/2015    allegra-D 12 hour, OTC flonase     CKD (chronic kidney disease) stage 3, GFR 30-59 ml/min (H) 6/23/2016    losartan     Controlled type 2 diabetes mellitus with stage 3 chronic kidney disease, without long-term current use of insulin (H) 9/11/2015    Prior Metformin XR hypoglycemia, XR stomach upset - tolerating Metformin   mg BID fall 2015 -> c/o GI so only taking 1/2 pill in the evening  as of 4/2016     Coronary artery disease involving native coronary artery of native heart without angina pectoris 9/17/2015    S/p CABG 1999, on ASA, Bblocker, statin, ARB. 6/2019 6/2019 Cor angio for unstable angina 1. Chronically occluded LIMA to LAD and moderate ostial/proximal LAD disease (non-flow limiting) 2. Moderately elevated left heart filling pressure (LVEDP 22 mmHg)     GERD (gastroesophageal reflux disease)      HLD (hyperlipidemia)      Hypertension      IBS (irritable bowel syndrome)       Obstructive sleep apnea 11/30/2015 11/29/2016 reported never got a CPAP machine.  Setting: APAP 5-12 Supplied by: [facility] PSG done: 11/10/2015 AHI 5 RDI 8 Lowest O2 Sat: 80% New 11/25/2015       CURRENT MEDICATIONS  Current Outpatient Medications   Medication Sig Dispense Refill     albuterol (PROAIR HFA/PROVENTIL HFA/VENTOLIN HFA) 108 (90 Base) MCG/ACT inhaler Inhale 2 puffs into the lungs every 4 hours as needed for shortness of breath / dyspnea or wheezing       amLODIPine (NORVASC) 10 MG tablet Take 1 tablet (10 mg) by mouth every evening 90 tablet 0     aspirin (ASA) 81 MG tablet Take 1 tablet (81 mg) by mouth daily       atorvastatin (LIPITOR) 20 MG tablet Take 20 mg by mouth At Bedtime       famotidine (PEPCID) 20 MG tablet Take 20 mg by mouth daily        fexofenadine-pseudoePHEDrine (ALLEGRA-D)  MG 12 hr tablet Take 1 tablet by mouth 2 times daily Seasonal use       fluocinonide (LIDEX) 0.05 % external solution        fluticasone (FLONASE) 50 MCG/ACT nasal spray Spray 2 sprays into both nostrils daily as needed        ketoconazole (NIZORAL) 2 % external cream APPLY TO AFFECTED AREA EVERY DAY       LORAZEPAM PO Take 0.5 mg by mouth nightly as needed for anxiety        metoprolol succinate ER (TOPROL-XL) 25 MG 24 hr tablet Take 1 tablet (25 mg) by mouth daily 90 tablet 0     Multiple Vitamins-Minerals (MULTI FOR HER PO) Take by mouth daily Plus omega-3       olopatadine (PATADAY) 0.2 % ophthalmic solution Place 1 drop into both eyes daily as needed       venlafaxine (EFFEXOR-XR) 150 MG 24 hr capsule Take 150 mg by mouth daily       nitroGLYcerin (NITROSTAT) 0.4 MG sublingual tablet For chest pain place 1 tablet under the tongue every 5 minutes for 3 doses. If symptoms persist 5 minutes after 2nd dose call 911. (Patient not taking: No sig reported) 30 tablet 1     traZODone (DESYREL) 100 MG tablet Take 50 mg by mouth At Bedtime          PAST SURGICAL HISTORY:  Past Surgical History:   Procedure  "Laterality Date     CARDIAC SURGERY       CV ANGIOGRAM CORONARY GRAFT N/A 2019    Procedure: Angiogram Coronary Graft;  Surgeon: Hayden Muñiz MD;  Location: [facility]     CV INSTANTANEOUS WAVE-FREE RATIO N/A 2019    Procedure: Instantaneous Wave-Free Ratio;  Surgeon: Hayden Muñiz MD;  Location:  HEART CARDIAC CATH LAB     CV LEFT HEART CATH N/A 2019    Procedure: Left Heart Cath;  Surgeon: Hayden Muñiz MD;  Location: [facility]ARD[facility]     EYE SURGERY       ORTHOPEDIC SURGERY         ALLERGIES     Allergies   Allergen Reactions     Amoxicillin-Pot Clavulanate GI Disturbance     Lisinopril Cough       FAMILY HISTORY  Family History   Problem Relation Age of Onset     Arthritis Mother      Coronary Artery Disease Father      Cancer Father        SOCIAL HISTORY  Social History     Socioeconomic History     Marital status: Single     Spouse name: Not on file     Number of children: 1     Years of education: Not on file     Highest education level: Not on file   Occupational History     Occupation: retired   Tobacco Use     Smoking status: Former Smoker     Start date:      Quit date:      Years since quittin.3     Smokeless tobacco: Never Used   Substance and Sexual Activity     Alcohol use: Yes     Drug use: No     Sexual activity: Not on file   Other Topics Concern     Not on file   Social History Narrative    Retired, lives in a senior apartment.       Social Determinants of Health     Financial Resource Strain: Not on file   Food Insecurity: Not on file   Transportation Needs: Not on file   Physical Activity: Not on file   Stress: Not on file   Social Connections: Not on file   Intimate Partner Violence: Not on file   Housing Stability: Not on file       ROS:   See hpi    EXAM:  /70   Pulse 53   Ht 1.626 m (5' 4\")   Wt 83.5 kg (184 lb)   SpO2 96%   BMI 31.58 kg/m    In general, the patient is a pleasant female in no apparent distress.  "   HEENT: NC/AT.  PERRLA.  EOMI.  Sclerae white, not injected.    Neck: No adenopathy.  No thyromegaly. Carotids +2/2 bilaterally without bruits.  No jugular venous distension.   Heart: RRR. Normal S1, S2 splits physiologically. No murmur, rub, click, or gallop.   Lungs: CTA.  No ronchi, wheezes, rales.  No dullness to percussion.   Abdomen: Soft, nontender, nondistended.  Extremities: No clubbing, cyanosis, or edema.    Vascular: No bruits are noted.    Labs:  LIPID RESULTS:  No results found for: CHOL, HDL, LDL, TRIG, CHOLHDLRATIO, NHDL    LIVER ENZYME RESULTS:  No results found for: AST, ALT    CBC RESULTS:  Lab Results   Component Value Date    WBC 7.9 04/17/2020    RBC 4.73 04/17/2020    HGB 14.3 04/17/2020    HCT 46.4 04/17/2020    MCV 98 04/17/2020    MCH 30.2 04/17/2020    MCHC 30.8 (L) 04/17/2020    RDW 13.0 04/17/2020     04/17/2020       BMP RESULTS:  Lab Results   Component Value Date     01/12/2021    POTASSIUM 4.6 01/12/2021    CHLORIDE 109 01/12/2021    CO2 32 01/12/2021    ANIONGAP <1 (L) 01/12/2021     (H) 01/12/2021    BUN 33 (H) 01/12/2021    CR 1.58 (H) 01/12/2021    GFRESTIMATED 32 (L) 01/12/2021    GFRESTBLACK 37 (L) 01/12/2021    DORIE 8.8 01/12/2021        A1C RESULTS:  Lab Results   Component Value Date    A1C 6.2 (H) 06/09/2019       Thank you for allowing me to participate in the care of your patient.      Sincerely,     Jesusita Mcdaniel MD     [facility]  cc:   Jesusita Mcdaniel MD  [address] [place] [id number]

## 2022-05-17 NOTE — PATIENT INSTRUCTIONS
Echocardiogram in next 2 weeks   Follow up renal ultrasound and abdominal aortic ultrasound in one year Then follow with Dr. Mcdaniel

## 2022-06-19 ENCOUNTER — HOSPITAL ENCOUNTER (EMERGENCY)
Facility: CLINIC | Age: 77
Discharge: HOME | End: 2022-06-20
Attending: EMERGENCY MEDICINE | Admitting: EMERGENCY MEDICINE
Payer: COMMERCIAL

## 2022-06-19 DIAGNOSIS — N30.91 HEMORRHAGIC CYSTITIS: ICD-10-CM

## 2022-06-19 LAB
ALBUMIN UR-MCNC: 50 MG/DL
APPEARANCE UR: ABNORMAL
BILIRUB UR QL STRIP: NEGATIVE
COLOR UR AUTO: YELLOW
GLUCOSE UR STRIP-MCNC: NEGATIVE MG/DL
HGB UR QL STRIP: ABNORMAL
HYALINE CASTS: 21 /LPF
KETONES UR STRIP-MCNC: ABNORMAL MG/DL
LEUKOCYTE ESTERASE UR QL STRIP: ABNORMAL
NITRATE UR QL: NEGATIVE
PH UR STRIP: 5.5 [PH] (ref 5–7)
RBC URINE: 38 /HPF
SP GR UR STRIP: 1.02 (ref 1–1.03)
UROBILINOGEN UR STRIP-MCNC: NORMAL MG/DL
WBC CLUMPS #/AREA URNS HPF: PRESENT /HPF
WBC URINE: >182 /HPF

## 2022-06-19 PROCEDURE — 81001 URINALYSIS AUTO W/SCOPE: CPT | Performed by: EMERGENCY MEDICINE

## 2022-06-19 PROCEDURE — 87086 URINE CULTURE/COLONY COUNT: CPT | Performed by: EMERGENCY MEDICINE

## 2022-06-19 PROCEDURE — 99283 EMERGENCY DEPT VISIT LOW MDM: CPT

## 2022-06-20 VITALS
OXYGEN SATURATION: 96 % | TEMPERATURE: 97.8 F | SYSTOLIC BLOOD PRESSURE: 135 MMHG | RESPIRATION RATE: 18 BRPM | WEIGHT: 180 LBS | HEART RATE: 55 BPM | HEIGHT: 63 IN | BODY MASS INDEX: 31.89 KG/M2 | DIASTOLIC BLOOD PRESSURE: 75 MMHG

## 2022-06-20 PROCEDURE — 250N000013 HC RX MED GY IP 250 OP 250 PS 637: Performed by: EMERGENCY MEDICINE

## 2022-06-20 RX ORDER — CEPHALEXIN 500 MG/1
500 CAPSULE ORAL ONCE
Status: COMPLETED | OUTPATIENT
Start: 2022-06-20 | End: 2022-06-20

## 2022-06-20 RX ORDER — CEPHALEXIN 500 MG/1
500 CAPSULE ORAL 2 TIMES DAILY
Qty: 14 CAPSULE | Refills: 0 | Status: SHIPPED | OUTPATIENT
Start: 2022-06-20

## 2022-06-20 RX ADMIN — CEPHALEXIN 500 MG: 500 CAPSULE ORAL at 00:20

## 2022-06-20 ASSESSMENT — ENCOUNTER SYMPTOMS
HEMATURIA: 1
FEVER: 0
BLOOD IN STOOL: 0
DYSURIA: 1

## 2022-06-20 NOTE — ED PROVIDER NOTES
History   Chief Complaint:  Hematuria     HPI   Princess Edgar is a 77 year old female with history of chronic kidney disease, diabetes mellitus, and hypertension who presents with hematuria. The patient reports that she has had dysuria and hematuria for the last four days with a subjective fever for two days that has now resolved. She has been taking an old antibiotic that she was prescribed for a previous UTI once a day. She took Tylenol for her pain today. Denies vaginal or rectal bleeding. No blood thinners. Her last blood work to check her kidney function was at the end of April.    BMP - 4/27/22  Creatinine: 1.50 (H)    Review of Systems   Constitutional: Negative for fever.   Gastrointestinal: Negative for blood in stool.   Genitourinary: Positive for dysuria and hematuria. Negative for vaginal bleeding.   All other systems reviewed and are negative.      Allergies:  Amoxicillin-Pot Clavulanate  Lisinopril    Medications:  Aspirin 81 mg  Amlodipine  Metoprolol  Albuterol inhaler  Atorvastatin  Famotidine  Allegra  Lorazepam  Trazodone  Venlafaxine    Past Medical History:     Chronic kidney disease  Hyperlipidemia  Hypertension  Acute coronary syndrome  Unstable angina  BPPV  Insomnia  Circadian rhythm sleep disorder  Type II diabetes mellitus  Coronary artery disease  Generalized anxiety disorder  Gastroesophageal reflux disease  MRSA  Irritable bowel syndrome  Microalbuminuria  Microhematuria  Asthma  Obesity  Obstructive sleep apnea  Hypercholesterolemia  Major depressive disorder    Past Surgical History:    Coronary angiogram bypass graft  Left heart catheterization  Iridotomy/iridectomy  Knee arthroplasty bilateral  Dental surgery  Cataract removal      Family History:    Father- prostate cancer, coronary artery disease  Mother- arthritis, glaucoma  Brother- arthritis  Sister- hypertension, arthritis     Social History:  The patient presents to the ED alone  PCP: Madison Langley MD    Physical Exam  "    Patient Vitals for the past 24 hrs:   Temp Temp src Pulse Resp SpO2 Height Weight   06/19/22 2324 97.8  F (36.6  C) Temporal 55 18 98 % 1.6 m (5' 3\") 81.6 kg (180 lb)       Physical Exam  Constitutional:  Oriented to person, place, and time. Well appearing.   HENT:   Head:    Normocephalic.   Mouth/Throat:   Oropharynx is clear and moist.   Eyes:    EOM are normal. Pupils are equal, round, and reactive to light.   Neck:    Neck supple.   Cardiovascular:  Normal rate, regular rhythm and normal heart sounds.      Exam reveals no gallop and no friction rub.       No murmur heard.  Pulmonary/Chest:  Effort normal and breath sounds normal.      No respiratory distress. No wheezes. No rales.      No reproducible chest wall pain.  Abdominal:   Soft. No distension. No tenderness. No rebound and no guarding. No percussion of flanks.  Musculoskeletal:  Normal range of motion.   Neurological:   Alert and oriented to person, place, and time.           Moves all 4 extremities spontaneously    Skin:    No rash noted. No pallor.    Emergency Department Course     Laboratory:  Labs Ordered and Resulted from Time of ED Arrival to Time of ED Departure   URINE MACROSCOPIC WITH REFLEX TO MICRO - Abnormal       Result Value    Color Urine Yellow      Appearance Urine Cloudy (*)     Glucose Urine Negative      Bilirubin Urine Negative      Ketones Urine Trace (*)     Specific Gravity Urine 1.022      Blood Urine Trace (*)     pH Urine 5.5      Protein Albumin Urine 50  (*)     Urobilinogen Urine Normal      Nitrite Urine Negative      Leukocyte Esterase Urine Large (*)     RBC Urine 38 (*)     WBC Urine >182 (*)     WBC Clumps Urine Present (*)     Hyaline Casts Urine 21 (*)    URINE CULTURE      Emergency Department Course:    Reviewed:  I reviewed nursing notes, vitals, past medical history and Care Everywhere    Assessments:  2346 I obtained history and examined the patient as noted above.     Interventions:  Medications "   cephALEXin (KEFLEX) capsule 500 mg (500 mg Oral Given 6/20/22 0020)     Disposition:  The patient was discharged to home.     Impression & Plan     CMS Diagnoses: None    Medical Decision Making:  This patient has symptoms consistent with a urinary tract infection.  Urinalysis confirms the infection.  There has been a subjective, but now resolved, fever. No back/flank pain or significant abdominal pain.  There is no clinical evidence of pyelonephritis, appendicitis, colitis, diverticulitis or any intraabdominal catastrophe. The hematuria is due to cystitis. The patient will be started on antibiotics for the infection. Return if increasing pain, vomiting, fever, or inability to tolerate the oral antibiotic.  Follow up with primary physician is indicated if not improving in 2-3 days.      Critical Care Time: was 0 minutes for this patient excluding procedures    Diagnosis:    ICD-10-CM    1. Hemorrhagic cystitis  N30.91        Discharge Medications:  New Prescriptions    CEPHALEXIN (KEFLEX) 500 MG CAPSULE    Take 1 capsule (500 mg) by mouth 2 times daily       Scribe Disclosure:  I, Yary Toney, am serving as a scribe at 11:39 PM on 6/19/2022 to document services personally performed by Shree Arellano MD based on my observations and the provider's statements to me.            Shree Arellano MD  06/20/22 6214

## 2022-06-20 NOTE — ED TRIAGE NOTES
"Pt arrives to ED for hematuria and dysuria that began four days ago. Pt has hx UTIs. Pt states she is \"bleeding from down there\". Took tylenol for pain with some relief. Pt states she has taken old PO abx for the last few days, \"but only one a day, but not today\".       "

## 2022-06-21 LAB — BACTERIA UR CULT: NORMAL

## 2022-08-06 ENCOUNTER — HEALTH MAINTENANCE LETTER (OUTPATIENT)
Age: 77
End: 2022-08-06

## 2022-10-22 ENCOUNTER — HEALTH MAINTENANCE LETTER (OUTPATIENT)
Age: 77
End: 2022-10-22

## 2022-12-04 ENCOUNTER — HEALTH MAINTENANCE LETTER (OUTPATIENT)
Age: 77
End: 2022-12-04

## 2023-03-16 NOTE — ED TRIAGE NOTES
"Pt arrives via EMS from her residence at [facility]- for witnessed syncopal episode.  Pt states she was sitting in her chair when she felt R-sided chest pain \"squeezing/pressure.\" She took a nitroglycerin, shortly thereafter she had a syncopal episode when she got up to go to the door.  ABCs in-tact. VSS.  " No

## 2023-04-01 ENCOUNTER — HEALTH MAINTENANCE LETTER (OUTPATIENT)
Age: 78
End: 2023-04-01

## 2023-05-09 ENCOUNTER — HOSPITAL ENCOUNTER (OUTPATIENT)
Dept: CARDIOLOGY | Facility: CLINIC | Age: 78
Discharge: HOME | End: 2023-05-09
Attending: INTERNAL MEDICINE
Payer: COMMERCIAL

## 2023-05-09 DIAGNOSIS — I70.1 RENAL ARTERY STENOSIS (H): ICD-10-CM

## 2023-05-09 DIAGNOSIS — I25.10 CORONARY ARTERY DISEASE INVOLVING NATIVE CORONARY ARTERY OF NATIVE HEART WITHOUT ANGINA PECTORIS: ICD-10-CM

## 2023-05-09 DIAGNOSIS — R06.02 SOB (SHORTNESS OF BREATH): ICD-10-CM

## 2023-05-09 LAB — LVEF ECHO: NORMAL

## 2023-05-09 PROCEDURE — 93975 VASCULAR STUDY: CPT | Mod: 26 | Performed by: INTERNAL MEDICINE

## 2023-05-09 PROCEDURE — 93306 TTE W/DOPPLER COMPLETE: CPT

## 2023-05-09 PROCEDURE — 76770 US EXAM ABDO BACK WALL COMP: CPT | Mod: XU

## 2023-05-09 PROCEDURE — 76770 US EXAM ABDO BACK WALL COMP: CPT | Mod: 26 | Performed by: INTERNAL MEDICINE

## 2023-05-09 PROCEDURE — 93978 VASCULAR STUDY: CPT

## 2023-05-09 PROCEDURE — 93978 VASCULAR STUDY: CPT | Mod: 26 | Performed by: INTERNAL MEDICINE

## 2023-05-09 PROCEDURE — 93306 TTE W/DOPPLER COMPLETE: CPT | Mod: 26 | Performed by: INTERNAL MEDICINE

## 2023-05-11 ENCOUNTER — OFFICE VISIT (OUTPATIENT)
Dept: CARDIOLOGY | Facility: CLINIC | Age: 78
End: 2023-05-11
Payer: COMMERCIAL

## 2023-05-11 VITALS
HEIGHT: 64 IN | BODY MASS INDEX: 31.67 KG/M2 | WEIGHT: 185.5 LBS | SYSTOLIC BLOOD PRESSURE: 150 MMHG | OXYGEN SATURATION: 95 % | HEART RATE: 50 BPM | DIASTOLIC BLOOD PRESSURE: 66 MMHG

## 2023-05-11 DIAGNOSIS — I25.10 CORONARY ARTERY DISEASE INVOLVING NATIVE CORONARY ARTERY OF NATIVE HEART WITHOUT ANGINA PECTORIS: ICD-10-CM

## 2023-05-11 DIAGNOSIS — I10 ESSENTIAL HYPERTENSION: Primary | ICD-10-CM

## 2023-05-11 PROCEDURE — 99214 OFFICE O/P EST MOD 30 MIN: CPT | Performed by: INTERNAL MEDICINE

## 2023-05-11 RX ORDER — AMLODIPINE BESYLATE 5 MG/1
5 TABLET ORAL EVERY EVENING
COMMUNITY
Start: 2023-05-11

## 2023-05-11 RX ORDER — TERAZOSIN 1 MG/1
1 CAPSULE ORAL AT BEDTIME
Qty: 90 CAPSULE | Refills: 3 | Status: SHIPPED | OUTPATIENT
Start: 2023-05-11

## 2023-05-11 RX ORDER — DIPHENOXYLATE HCL/ATROPINE 2.5-.025MG
1 TABLET ORAL DAILY PRN
COMMUNITY
Start: 2022-12-28

## 2023-05-11 RX ORDER — ATORVASTATIN CALCIUM 40 MG/1
40 TABLET, FILM COATED ORAL AT BEDTIME
Qty: 90 TABLET | Refills: 3 | Status: SHIPPED | OUTPATIENT
Start: 2023-05-11 | End: 2024-04-23

## 2023-05-11 NOTE — PROGRESS NOTES
Service Date: 05/11/2023    CARDIOLOGY FOLLOWUP VISIT    REFERRING PHYSICIAN:  Dr. Madison Langley    HISTORY OF PRESENT ILLNESS:  It is my pleasure to see your patient, Princess Edgar.  This is a patient who underwent prior coronary artery bypass grafting with a LIMA to the LAD.  The reason for that was recurrent restenosis of LAD stent.  The patient underwent coronary angiography in 2019 and I reviewed that coronary angiogram today.  Interestingly, the LIMA to the LAD is 100% occluded, maybe 2/3 the way down but the LAD stent was only 50% restenosed and the fractional flow reserve across that 50% stenosis was not flow limiting.  Stress echocardiography also around that time did not show any evidence of ischemia.  The big issue is that this patient has somewhat labile blood pressures.  Her blood pressure was well controlled for the last year or so but then today, it was raised at 150/66.  She was on 10 mg of amlodipine and she switched down to 5 mg because she was getting dizziness and lightheadedness.  To complicate matters even further, this patient has benign positional vertigo.  When she moved from the chair up to the examination table, she actually did feel somewhat dizzy.  Movements of her head will also make her feel dizzy which sounds distinctly vertiginous.  However, she has not any syncope or near syncope.  It is interesting that even with the 5 mg dose of amlodipine, she has some dizziness, though her blood pressure has been raised for the last week.  Her lipid profile was in [facility] at [facility] and this showed that her LDL cholesterol is not at goal.  Her LDL is 76, HDL is 67 and triglycerides are 106, and though it is not bad lipid profile by any means, we would like to see that LDL below 70.  She is taking 20 mg of atorvastatin which is less than the lowest dose recommended by the American College of Cardiology for patients with established coronary artery disease which is 40 mg.  She is not  complaining of any chest pains or chest pressure.  In the past, there was some suggestion possibility of stenosis of the aorta and renal artery stenosis.  I would refer you to Dr. Mcdaniel's note of 2022.  However, ultrasound of the aorta iliacs was performed 2 days ago and this showed normal abdominal aortic velocities without obstruction and no evidence of renal artery stenosis either.    This patient had an echocardiogram also performed 2 days ago and this showed that the left ventricular systolic function was normal.  The EF was normal at 60%.  No significant valvular heart disease was noted.  Her blood pressure was mildly raised on that test 2 days ago at 146/89.  So, she has sustained higher blood pressures.  She tells me that her mother and her dog both  on the same day and that she became depressed after that and was in her bed all winter and only really got out of her bed here in May.  So, she has been quite inactive.  Her weight has gone up from 166 pounds in 2020 to 185 pounds.  That will, of course, increase her blood pressure.    IMPRESSION:    1.  Hypertension.  The blood pressure is raised.  In part, this may be due to the fact that she has gained a significant amount of weight in essentially 2-1/2 years, as described above.  2.  Dizziness which is probably multifactorial.  I suspect that vertigo is a significant component to this.  3.  LDL cholesterol not at goal, as described above, but otherwise an excellent lipid profile.  4.  No evidence of abdominal aortic obstruction or aneurysm and no evidence of renal artery stenosis with normal velocities.  5.  Known renal insufficiency.    PLAN:  The patient really does not want to increase the dose of amlodipine from 5 to 10 mg since she thinks it increases her dizziness and she also thinks that when the dose was reduced from 10 to 5, that her dizziness markedly improved.  I do not want to use an angiotensin receptor blocker because of her renal  insufficiency and she is intolerant to lisinopril which causes a cough, so I will add in an alpha blocker which is terazosin 1 mg per day.  I will also increase her dose of atorvastatin to 40 mg per day and we will check her lipids in 8 weeks' time with an ALT.  She will also have a nurse visit with blood pressure check in 2 weeks' time to see how she is doing with that additional medication.    It is my pleasure to be involved the care of this nice patient.    Filipe Llamas MD, FACC    cc:  Madison Langley MD   [facility]  [address] [place] [address]    Fliipe Llamas MD, FACC        D: 2023   T: 2023   MT: john    Name:     HANS LLOYD  MRN:      [id number]        Account:      [id number]   :      1945           Service Date: 2023       Document: [id number]

## 2023-05-11 NOTE — PROGRESS NOTES
HPI and Plan:   See dictation          Orders Placed This Encounter   Procedures     Basic metabolic panel     Lipid Profile     ALT     Lipid Profile     ALT     Follow-Up with Cardiology Nurse     Follow-Up with Cardiology       Orders Placed This Encounter   Medications     diphenoxylate-atropine (LOMOTIL) 2.5-0.025 MG tablet     Sig: Take 1 tablet by mouth daily as needed     terazosin (HYTRIN) 1 MG capsule     Sig: Take 1 capsule (1 mg) by mouth At Bedtime     Dispense:  90 capsule     Refill:  3     amLODIPine (NORVASC) 5 MG tablet     Sig: Take 1 tablet (5 mg) by mouth every evening     atorvastatin (LIPITOR) 40 MG tablet     Sig: Take 1 tablet (40 mg) by mouth At Bedtime     Dispense:  90 tablet     Refill:  3       Medications Discontinued During This Encounter   Medication Reason     amLODIPine (NORVASC) 10 MG tablet      atorvastatin (LIPITOR) 20 MG tablet Reorder (No AVS / No eCancel)         Encounter Diagnoses   Name Primary?     Coronary artery disease involving native coronary artery of native heart without angina pectoris      Essential hypertension Yes       CURRENT MEDICATIONS:  Current Outpatient Medications   Medication Sig Dispense Refill     albuterol (PROAIR HFA/PROVENTIL HFA/VENTOLIN HFA) 108 (90 Base) MCG/ACT inhaler Inhale 2 puffs into the lungs every 4 hours as needed for shortness of breath / dyspnea or wheezing       amLODIPine (NORVASC) 5 MG tablet Take 1 tablet (5 mg) by mouth every evening       aspirin (ASA) 81 MG tablet Take 1 tablet (81 mg) by mouth daily       atorvastatin (LIPITOR) 40 MG tablet Take 1 tablet (40 mg) by mouth At Bedtime 90 tablet 3     diphenoxylate-atropine (LOMOTIL) 2.5-0.025 MG tablet Take 1 tablet by mouth daily as needed       famotidine (PEPCID) 20 MG tablet Take 20 mg by mouth daily        fexofenadine-pseudoePHEDrine (ALLEGRA-D)  MG 12 hr tablet Take 1 tablet by mouth 2 times daily Seasonal use       fluocinonide (LIDEX) 0.05 % external solution         fluticasone (FLONASE) 50 MCG/ACT nasal spray Spray 2 sprays into both nostrils daily as needed        ketoconazole (NIZORAL) 2 % external cream APPLY TO AFFECTED AREA EVERY DAY       LORAZEPAM PO Take 0.5 mg by mouth nightly as needed for anxiety        metoprolol succinate ER (TOPROL-XL) 25 MG 24 hr tablet Take 1 tablet (25 mg) by mouth daily 90 tablet 0     Multiple Vitamins-Minerals (MULTI FOR HER PO) Take by mouth daily Plus omega-3       nitroGLYcerin (NITROSTAT) 0.4 MG sublingual tablet For chest pain place 1 tablet under the tongue every 5 minutes for 3 doses. If symptoms persist 5 minutes after 2nd dose call 911. 30 tablet 1     olopatadine (PATADAY) 0.2 % ophthalmic solution Place 1 drop into both eyes daily as needed       terazosin (HYTRIN) 1 MG capsule Take 1 capsule (1 mg) by mouth At Bedtime 90 capsule 3     venlafaxine (EFFEXOR-XR) 150 MG 24 hr capsule Take 150 mg by mouth daily       cephALEXin (KEFLEX) 500 MG capsule Take 1 capsule (500 mg) by mouth 2 times daily (Patient not taking: Reported on 5/11/2023) 14 capsule 0     traZODone (DESYREL) 100 MG tablet Take 50 mg by mouth At Bedtime          ALLERGIES     Allergies   Allergen Reactions     Amoxicillin-Pot Clavulanate GI Disturbance     Lisinopril Cough       PAST MEDICAL HISTORY:  Past Medical History:   Diagnosis Date     Anxiety      Arthritis      BPPV (benign paroxysmal positional vertigo)      Chronic insomnia 11/29/2016    ativan at bedtime since knee surgery in 3132-1353 - discussed not rec long term - dependency risk.  Trial of trazodone 8/2016 caused paradoxical effect and jittery, however it tolerating well 100 mg 1/2018 -6/18/2018 and effective.     Chronic seasonal allergic rhinitis due to fungal spores 9/17/2015    allegra-D 12 hour, OTC flonase     CKD (chronic kidney disease) stage 3, GFR 30-59 ml/min (H) 6/23/2016    losartan     Controlled type 2 diabetes mellitus with stage 3 chronic kidney disease, without long-term  current use of insulin (H) 2015    Prior Metformin XR hypoglycemia, XR stomach upset - tolerating Metformin   mg BID 2015 -> c/o GI so only taking 1/2 pill in the evening  as of 2016     Coronary artery disease involving native coronary artery of native heart without angina pectoris 2015    S/p CABG , on ASA, Bblocker, statin, ARB. 2019 Cor angio for unstable angina 1. Chronically occluded LIMA to LAD and moderate ostial/proximal LAD disease (non-flow limiting) 2. Moderately elevated left heart filling pressure (LVEDP 22 mmHg)     GERD (gastroesophageal reflux disease)      HLD (hyperlipidemia)      Hypertension      IBS (irritable bowel syndrome)      Obstructive sleep apnea 2015 reported never got a CPAP machine.  Setting: APAP 5-12 Supplied by: [facility] PSG done: 11/10/2015 AHI 5 RDI 8 Lowest O2 Sat: 80% New 2015       PAST SURGICAL HISTORY:  Past Surgical History:   Procedure Laterality Date     CARDIAC SURGERY       CV ANGIOGRAM CORONARY GRAFT N/A 2019    Procedure: Angiogram Coronary Graft;  Surgeon: Hayden Muñiz MD;  Location:  [facility]ARD[facility] CATH [facility]     CV INSTANTANEOUS WAVE-FREE RATIO N/A 2019    Procedure: Instantaneous Wave-Free Ratio;  Surgeon: Hayden Muñiz MD;  Location:  [facility]     CV LEFT HEART CATH N/A 2019    Procedure: Left Heart Cath;  Surgeon: Hayden Muñiz MD;  Location: [facility]     EYE SURGERY       ORTHOPEDIC SURGERY         FAMILY HISTORY:  Family History   Problem Relation Age of Onset     Arthritis Mother      Coronary Artery Disease Father      Cancer Father        SOCIAL HISTORY:  Social History     Socioeconomic History     Marital status: Single     Number of children: 1   Occupational History     Occupation: retired   Tobacco Use     Smoking status: Former     Types: Cigarettes     Start date:      Quit date:      Years since quittin.3     Smokeless  "tobacco: Never   Substance and Sexual Activity     Alcohol use: Yes     Drug use: No   Social History Narrative    Retired, lives in a senior apartment.         Review of Systems:  Skin:          Eyes:         ENT:         Respiratory:  Positive for shortness of breath;sleep apnea Orthopnea   Cardiovascular:    Positive for;lightheadedness;dizziness;fatigue;heaviness Occasional feels heaviness in chest only when lying down  Gastroenterology:        Genitourinary:         Musculoskeletal:         Neurologic:         Psychiatric:         Heme/Lymph/Imm:         Endocrine:           Physical Exam:  Vitals: BP (!) 150/66 (BP Location: Right arm, Patient Position: Sitting, Cuff Size: Adult Regular)   Pulse 50   Ht 1.626 m (5' 4\")   Wt 84.1 kg (185 lb 8 oz)   SpO2 95%   BMI 31.84 kg/m      Constitutional:  cooperative;well nourished;cooperative, alert and oriented, well developed, well nourished, in no acute distress overweight      Skin:  warm and dry to the touch          Head:  normocephalic        Eyes:           Lymph:No Cervical lymphadenopathy present     ENT:  no pallor or cyanosis        Neck:  JVP normal;no carotid bruit;carotid pulses are full and equal bilaterally        Respiratory:  clear to auscultation;normal respiratory excursion         Cardiac: regular rhythm;normal S1 and S2;no murmurs, gallops or rubs detected   distant heart sounds            pulses full and equal                                        GI:    obese      Extremities and Muscular Skeletal:  no edema;no deformities, clubbing, cyanosis, erythema observed              Neurological:  no gross motor deficits;affect appropriate        Psych:  Alert and Oriented x 3        CC  Jesusita Mcdaniel MD  [address] [place] [id number]              "

## 2023-05-11 NOTE — LETTER
5/11/2023    [facility] [address] [place] [address]    RE: Princess Edgar       Dear Colleague,     I had the pleasure of seeing Princess Edgar in the [facility].  HPI and Plan:   See dictation          Orders Placed This Encounter   Procedures    Basic metabolic panel    Lipid Profile    ALT    Lipid Profile    ALT    Follow-Up with Cardiology Nurse    Follow-Up with Cardiology       Orders Placed This Encounter   Medications    diphenoxylate-atropine (LOMOTIL) 2.5-0.025 MG tablet     Sig: Take 1 tablet by mouth daily as needed    terazosin (HYTRIN) 1 MG capsule     Sig: Take 1 capsule (1 mg) by mouth At Bedtime     Dispense:  90 capsule     Refill:  3    amLODIPine (NORVASC) 5 MG tablet     Sig: Take 1 tablet (5 mg) by mouth every evening    atorvastatin (LIPITOR) 40 MG tablet     Sig: Take 1 tablet (40 mg) by mouth At Bedtime     Dispense:  90 tablet     Refill:  3       Medications Discontinued During This Encounter   Medication Reason    amLODIPine (NORVASC) 10 MG tablet     atorvastatin (LIPITOR) 20 MG tablet Reorder (No AVS / No eCancel)         Encounter Diagnoses   Name Primary?    Coronary artery disease involving native coronary artery of native heart without angina pectoris     Essential hypertension Yes       CURRENT MEDICATIONS:  Current Outpatient Medications   Medication Sig Dispense Refill    albuterol (PROAIR HFA/PROVENTIL HFA/VENTOLIN HFA) 108 (90 Base) MCG/ACT inhaler Inhale 2 puffs into the lungs every 4 hours as needed for shortness of breath / dyspnea or wheezing      amLODIPine (NORVASC) 5 MG tablet Take 1 tablet (5 mg) by mouth every evening      aspirin (ASA) 81 MG tablet Take 1 tablet (81 mg) by mouth daily      atorvastatin (LIPITOR) 40 MG tablet Take 1 tablet (40 mg) by mouth At Bedtime 90 tablet 3    diphenoxylate-atropine (LOMOTIL) 2.5-0.025 MG tablet Take 1 tablet by mouth daily as needed      famotidine (PEPCID) 20 MG tablet Take  20 mg by mouth daily       fexofenadine-pseudoePHEDrine (ALLEGRA-D)  MG 12 hr tablet Take 1 tablet by mouth 2 times daily Seasonal use      fluocinonide (LIDEX) 0.05 % external solution       fluticasone (FLONASE) 50 MCG/ACT nasal spray Spray 2 sprays into both nostrils daily as needed       ketoconazole (NIZORAL) 2 % external cream APPLY TO AFFECTED AREA EVERY DAY      LORAZEPAM PO Take 0.5 mg by mouth nightly as needed for anxiety       metoprolol succinate ER (TOPROL-XL) 25 MG 24 hr tablet Take 1 tablet (25 mg) by mouth daily 90 tablet 0    Multiple Vitamins-Minerals (MULTI FOR HER PO) Take by mouth daily Plus omega-3      nitroGLYcerin (NITROSTAT) 0.4 MG sublingual tablet For chest pain place 1 tablet under the tongue every 5 minutes for 3 doses. If symptoms persist 5 minutes after 2nd dose call 911. 30 tablet 1    olopatadine (PATADAY) 0.2 % ophthalmic solution Place 1 drop into both eyes daily as needed      terazosin (HYTRIN) 1 MG capsule Take 1 capsule (1 mg) by mouth At Bedtime 90 capsule 3    venlafaxine (EFFEXOR-XR) 150 MG 24 hr capsule Take 150 mg by mouth daily      cephALEXin (KEFLEX) 500 MG capsule Take 1 capsule (500 mg) by mouth 2 times daily (Patient not taking: Reported on 5/11/2023) 14 capsule 0    traZODone (DESYREL) 100 MG tablet Take 50 mg by mouth At Bedtime          ALLERGIES     Allergies   Allergen Reactions    Amoxicillin-Pot Clavulanate GI Disturbance    Lisinopril Cough       PAST MEDICAL HISTORY:  Past Medical History:   Diagnosis Date    Anxiety     Arthritis     BPPV (benign paroxysmal positional vertigo)     Chronic insomnia 11/29/2016    ativan at bedtime since knee surgery in [id number] - discussed not rec long term - dependency risk.  Trial of trazodone 8/2016 caused paradoxical effect and jittery, however it tolerating well 100 mg 1/2018 -6/18/2018 and effective.    Chronic seasonal allergic rhinitis due to fungal spores 9/17/2015    allegra-D 12 hour, OTC flonase    CKD  (chronic kidney disease) stage 3, GFR 30-59 ml/min (H) 6/23/2016    losartan    Controlled type 2 diabetes mellitus with stage 3 chronic kidney disease, without long-term current use of insulin (H) 9/11/2015    Prior Metformin XR hypoglycemia, XR stomach upset - tolerating Metformin   mg BID fall 2015 -> c/o GI so only taking 1/2 pill in the evening  as of 4/2016    Coronary artery disease involving native coronary artery of native heart without angina pectoris 9/17/2015    S/p CABG 1999, on ASA, Bblocker, statin, ARB. 6/2019 6/2019 Cor angio for unstable angina 1. Chronically occluded LIMA to LAD and moderate ostial/proximal LAD disease (non-flow limiting) 2. Moderately elevated left heart filling pressure (LVEDP 22 mmHg)    GERD (gastroesophageal reflux disease)     HLD (hyperlipidemia)     Hypertension     IBS (irritable bowel syndrome)     Obstructive sleep apnea 11/30/2015 11/29/2016 reported never got a CPAP machine.  Setting: APAP 5-12 Supplied by: [facility] PSG done: 11/10/2015 AHI 5 RDI 8 Lowest O2 Sat: 80% New 11/25/2015       PAST SURGICAL HISTORY:  Past Surgical History:   Procedure Laterality Date    CARDIAC SURGERY      CV ANGIOGRAM CORONARY GRAFT N/A 6/11/2019    Procedure: Angiogram Coronary Graft;  Surgeon: Hayden Muñiz MD;  Location:  HEART CARDIAC CATH LAB    CV INSTANTANEOUS WAVE-FREE RATIO N/A 6/11/2019    Procedure: Instantaneous Wave-Free Ratio;  Surgeon: Hayden Muñiz MD;  Location:  HEART CARDIAC CATH LAB    CV LEFT HEART CATH N/A 6/11/2019    Procedure: Left Heart Cath;  Surgeon: Hayden Muñiz MD;  Location:  HEART CARDIAC CATH LAB    EYE SURGERY      ORTHOPEDIC SURGERY         FAMILY HISTORY:  Family History   Problem Relation Age of Onset    Arthritis Mother     Coronary Artery Disease Father     Cancer Father        SOCIAL HISTORY:  Social History     Socioeconomic History    Marital status: Single    Number of children: 1   Occupational History    Occupation:  "retired   Tobacco Use    Smoking status: Former     Types: Cigarettes     Start date:      Quit date:      Years since quittin.3    Smokeless tobacco: Never   Substance and Sexual Activity    Alcohol use: Yes    Drug use: No   Social History Narrative    Retired, lives in a senior apartment.         Review of Systems:  Skin:          Eyes:         ENT:         Respiratory:  Positive for shortness of breath;sleep apnea Orthopnea   Cardiovascular:    Positive for;lightheadedness;dizziness;fatigue;heaviness Occasional feels heaviness in chest only when lying down  Gastroenterology:        Genitourinary:         Musculoskeletal:         Neurologic:         Psychiatric:         Heme/Lymph/Imm:         Endocrine:           Physical Exam:  Vitals: BP (!) 150/66 (BP Location: Right arm, Patient Position: Sitting, Cuff Size: Adult Regular)   Pulse 50   Ht 1.626 m (5' 4\")   Wt 84.1 kg (185 lb 8 oz)   SpO2 95%   BMI 31.84 kg/m      Constitutional:  cooperative;well nourished;cooperative, alert and oriented, well developed, well nourished, in no acute distress overweight      Skin:  warm and dry to the touch          Head:  normocephalic        Eyes:           Lymph:No Cervical lymphadenopathy present     ENT:  no pallor or cyanosis        Neck:  JVP normal;no carotid bruit;carotid pulses are full and equal bilaterally        Respiratory:  clear to auscultation;normal respiratory excursion         Cardiac: regular rhythm;normal S1 and S2;no murmurs, gallops or rubs detected   distant heart sounds            pulses full and equal                                        GI:    obese      Extremities and Muscular Skeletal:  no edema;no deformities, clubbing, cyanosis, erythema observed              Neurological:  no gross motor deficits;affect appropriate        Psych:  Alert and Oriented x 3        CC  Jesusita Mcdaniel MD  [address] [place] [id number]                Service Date: " 05/11/2023    CARDIOLOGY FOLLOWUP VISIT    REFERRING PHYSICIAN:  Dr. Madison Langley    HISTORY OF PRESENT ILLNESS:  It is my pleasure to see your patient, Princess Edgar.  This is a patient who underwent prior coronary artery bypass grafting with a LIMA to the LAD.  The reason for that was recurrent restenosis of LAD stent.  The patient underwent coronary angiography in 2019 and I reviewed that coronary angiogram today.  Interestingly, the LIMA to the LAD is 100% occluded, maybe 2/3 the way down but the LAD stent was only 50% restenosed and the fractional flow reserve across that 50% stenosis was not flow limiting.  Stress echocardiography also around that time did not show any evidence of ischemia.  The big issue is that this patient has somewhat labile blood pressures.  Her blood pressure was well controlled for the last year or so but then today, it was raised at 150/66.  She was on 10 mg of amlodipine and she switched down to 5 mg because she was getting dizziness and lightheadedness.  To complicate matters even further, this patient has benign positional vertigo.  When she moved from the chair up to the examination table, she actually did feel somewhat dizzy.  Movements of her head will also make her feel dizzy which sounds distinctly vertiginous.  However, she has not any syncope or near syncope.  It is interesting that even with the 5 mg dose of amlodipine, she has some dizziness, though her blood pressure has been raised for the last week.  Her lipid profile was in [facility] at [facility] and this showed that her LDL cholesterol is not at goal.  Her LDL is 76, HDL is 67 and triglycerides are 106, and though it is not bad lipid profile by any means, we would like to see that LDL below 70.  She is taking 20 mg of atorvastatin which is less than the lowest dose recommended by the American College of Cardiology for patients with established coronary artery disease which is 40 mg.  She is not complaining  of any chest pains or chest pressure.  In the past, there was some suggestion possibility of stenosis of the aorta and renal artery stenosis.  I would refer you to Dr. Mcdaniel's note of 2022.  However, ultrasound of the aorta iliacs was performed 2 days ago and this showed normal abdominal aortic velocities without obstruction and no evidence of renal artery stenosis either.    This patient had an echocardiogram also performed 2 days ago and this showed that the left ventricular systolic function was normal.  The EF was normal at 60%.  No significant valvular heart disease was noted.  Her blood pressure was mildly raised on that test 2 days ago at 146/89.  So, she has sustained higher blood pressures.  She tells me that her mother and her dog both  on the same day and that she became depressed after that and was in her bed all winter and only really got out of her bed here in May.  So, she has been quite inactive.  Her weight has gone up from 166 pounds in 2020 to 185 pounds.  That will, of course, increase her blood pressure.    IMPRESSION:    1.  Hypertension.  The blood pressure is raised.  In part, this may be due to the fact that she has gained a significant amount of weight in essentially 2-1/2 years, as described above.  2.  Dizziness which is probably multifactorial.  I suspect that vertigo is a significant component to this.  3.  LDL cholesterol not at goal, as described above, but otherwise an excellent lipid profile.  4.  No evidence of abdominal aortic obstruction or aneurysm and no evidence of renal artery stenosis with normal velocities.  5.  Known renal insufficiency.    PLAN:  The patient really does not want to increase the dose of amlodipine from 5 to 10 mg since she thinks it increases her dizziness and she also thinks that when the dose was reduced from 10 to 5, that her dizziness markedly improved.  I do not want to use an angiotensin receptor blocker because of her renal  insufficiency and she is intolerant to lisinopril which causes a cough, so I will add in an alpha blocker which is terazosin 1 mg per day.  I will also increase her dose of atorvastatin to 40 mg per day and we will check her lipids in 8 weeks' time with an ALT.  She will also have a nurse visit with blood pressure check in 2 weeks' time to see how she is doing with that additional medication.    It is my pleasure to be involved the care of this nice patient.    Filipe Llamas MD, FACC    cc:  Madison Langley MD   [facility] [address] [place] [address]    Filipe Llamas MD, [facility]        D: 2023   T: 2023   MT: john    Name:     HANS LLOYD  MRN:      [id number]        Account:      [id number]   :      1945           Service Date: 2023       Document: [id number]     Thank you for allowing me to participate in the care of your patient.      Sincerely,     Filipe Waggoner MD, MD     [facility]  cc:   Jesusita Mcdaniel MD  [address] [place] [id number]

## 2023-05-11 NOTE — PATIENT INSTRUCTIONS
Your blood pressure was elevated at your appointment today.  Elevated blood pressure can increase your risk of a heart attack, stroke and heart failure.  For this reason, we feel it is important to monitor your blood pressure closely.  If you have access to a home blood pressure monitor or are able to check your blood pressure at a local pharmacy in the next week we would like you to do so and call our clinic with those readings. Please call [phone] ([place]) and leave a message with your name, date of birth, blood pressure reading, date and location it was completed. If your blood pressure remains elevated your care team will be notified.  We appreciate being a part of your healthcare team and look forward to hearing from you soon.

## 2023-08-27 ENCOUNTER — HEALTH MAINTENANCE LETTER (OUTPATIENT)
Age: 78
End: 2023-08-27

## 2023-09-12 ENCOUNTER — ALLIED HEALTH/NURSE VISIT (OUTPATIENT)
Dept: CARDIOLOGY | Facility: CLINIC | Age: 78
End: 2023-09-12
Payer: COMMERCIAL

## 2023-09-12 ENCOUNTER — DOCUMENTATION ONLY (OUTPATIENT)
Dept: CARDIOLOGY | Facility: CLINIC | Age: 78
End: 2023-09-12

## 2023-09-12 VITALS — HEART RATE: 64 BPM | OXYGEN SATURATION: 95 % | DIASTOLIC BLOOD PRESSURE: 79 MMHG | SYSTOLIC BLOOD PRESSURE: 122 MMHG

## 2023-09-12 DIAGNOSIS — I10 HTN (HYPERTENSION): Primary | ICD-10-CM

## 2023-09-12 PROCEDURE — 99207 PR NO CHARGE LOS: CPT

## 2023-09-12 NOTE — PROGRESS NOTES
[facility] BLOOD PRESSURE CHECK     Last office visit: 5/11/23    Previous blood pressure: 150/66 mm Hg  Previous heart rate: 66 bpm      Time of visit: 3:15 pm    Morning medications were taken at: 11am     Today's blood pressure: 122/79  mm Hg  Today's heart rate: 64 bpm     Home monitor blood pressure: n/a mmHg  Home monitor heart rate: n/a bpm      Additional Comments: N/A      Results routed to: Gillian Canales      Ordering Provider: Edilson  In clinic Provider:

## 2023-09-18 NOTE — PROGRESS NOTES
"Per office note dated 5/11/23, Dr. Waggoner recommended, \"The patient really does not want to increase the dose of amlodipine from 5 to 10 mg since she thinks it increases her dizziness and she also thinks that when the dose was reduced from 10 to 5, that her dizziness markedly improved. I do not want to use an angiotensin receptor blocker because of her renal insufficiency and she is intolerant to lisinopril which causes a cough, so I will add in an alpha blocker which is terazosin 1 mg per day. I will also increase her dose of atorvastatin to 40 mg per day and we will check her lipids in 8 weeks' time with an ALT. She will also have a nurse visit with blood pressure check in 2 weeks' time to see how she is doing with that additional medication.\"     Sent pt My Chart message that BP check is good & to continue current medications. Pt asked to call scheduling ot schedule overdue FLP/ALT. Jacinto LI   "

## 2023-09-22 NOTE — ACP (ADVANCE CARE PLANNING)
"SPIRITUAL HEALTH SERVICES Progress Note  [facility] Med/Surg 3rd floor    SH consult per pt's request for  support in facilitation of ACP information gathering.   Pt, Princess, shares that she filled out a HCD about \"twenty years ago\" during her 's illness and feels she would need to complete a new one now.   Princess requested information only at this time as she isn't sure who she would name as her HCA. Provided with informational resources and HCD document per request. SH remains available per pt/family/staff need or request.     SHIRA Whitolck.  Staff    Pager #[phone]     " Odomzo Counseling- I discussed with the patient the risks of Odomzo including but not limited to nausea, vomiting, diarrhea, constipation, weight loss, changes in the sense of taste, decreased appetite, muscle spasms, and hair loss.  The patient verbalized understanding of the proper use and possible adverse effects of Odomzo.  All of the patient's questions and concerns were addressed.

## 2024-01-11 ENCOUNTER — NURSE TRIAGE (OUTPATIENT)
Dept: CARDIOLOGY | Facility: CLINIC | Age: 79
End: 2024-01-11
Payer: COMMERCIAL

## 2024-01-11 NOTE — TELEPHONE ENCOUNTER
-- Message is from the [facility]--    Referral Request  Name of Specialist: Dr. Cano   Provider's specialty: Endocrinology  Reason for referral: Diabetes     Is this a NEW request?: yes      Referral ordered by: Dr. Cornelius       Insurance type: HMO      Payor: [employer] MEDICARE COMPLETE / Plan: MEDICARE COMPLETE HMO / Product Type: MEDADV      Preferred Delivery Method   Call when ready for pickup - phone number to notify: [phone]       Patient has appointment 5/30/2019 at 10:30 and has been requesting referral for a while now     Caller Information       Type Contact Phone    05/28/2019 08:55 AM Phone (Incoming) Siri Allen (Self) [phone] (M)          Alternative phone number: none     Turnaround time given to caller:   \"This message will be sent to [state Provider's full name]. The clinical team will return your call as soon as they review your message. Typically, it takes 3 business days to process referral requests.\"   Called patient, states she is state has been taking the amlodipine 5 mg daily and metoprolol 25 mg daily She did not start the terazosin as she felt it would be harmful to her kidneys.  Patient states denies eating more sodium in her diet but does admit to just eating a quarter of a pizza.  Patient states she is also not been very active as she has been very depressed.  Patient advised to speak with her PCP regarding her depression and advised to start the terazosin 1 mg daily as recommended by Dr. Brayan Llamas.  Will follow-up with patient in about 4 days.  FRANKLIN Canales RN

## 2024-01-11 NOTE — TELEPHONE ENCOUNTER
"Received a call from the call center to discuss high blood pressure.  Spoke to Princess, who says for the last several days her BP has been high. She uses a BP monitor and has the information stored on it. She says the diastolic has been running in the 100's yesterday. She says she is a night owl, and goes to bed usually at 5 am, so just got up recently. She thinks she mixed up on when to take metoprolol and amlodipine. She was taking metoprolol in the morning and amlodipine in the evening, but this morning at 5 am took both together. She says she has been feeling weak and sweaty also, so has not been active, feeling depressed, and has been lying around in bed the last 3 days. She says she has been eating food, drinking fluids, and taking her medication.   BP just prior to calling 176/88. She denies a headache, dizziness, or double/blurry vision.  She would like to speak to her care team about next steps.  Advised a message will be sent to Dr. Brayan Llamas's team for follow up.    1. BLOOD PRESSURE: \"What is the blood pressure?\" \"Did you take at least two measurements 5 minutes apart?\"  2. ONSET: \"When did you take your blood pressure?\" prior to calling  3. HOW: \"How did you take your blood pressure?\" (e.g., automatic home BP monitor, visiting nurse) BP monitor  4. HISTORY: \"Do you have a history of high blood pressure?\" yes  5. MEDICINES: \"Are you taking any medicines for blood pressure?\" \"Have you missed any doses recently?\" Metoprolol, amlodipine  6. OTHER SYMPTOMS: \"Do you have any symptoms?\" (e.g., blurred vision, chest pain, difficulty breathing, headache, weakness) weakness  7. PREGNANCY: \"Is there any chance you are pregnant?\" \"When was your last menstrual period?\"  Additional Information    Negative: Systolic BP >= 160 OR Diastolic >= 100, and any cardiac (e.g., breathing difficulty, chest pain) or neurologic symptoms (e.g., new-onset blurred or double vision)    Protocols used: Blood Pressure - High-A-OH    "

## 2024-01-14 ENCOUNTER — HEALTH MAINTENANCE LETTER (OUTPATIENT)
Age: 79
End: 2024-01-14

## 2024-01-15 NOTE — TELEPHONE ENCOUNTER
Called pt, states her BP is better, today it is 145/80 w/ pulse of 68 bpm. Pt advised to continue to monitor and call if BP consistently runs greater than 140/90.  FRANKLIN Canales RN

## 2024-02-22 ENCOUNTER — HOSPITAL ENCOUNTER (EMERGENCY)
Facility: CLINIC | Age: 79
Discharge: HOME | End: 2024-02-22
Attending: EMERGENCY MEDICINE | Admitting: EMERGENCY MEDICINE
Payer: COMMERCIAL

## 2024-02-22 ENCOUNTER — APPOINTMENT (OUTPATIENT)
Dept: CT IMAGING | Facility: CLINIC | Age: 79
End: 2024-02-22
Payer: COMMERCIAL

## 2024-02-22 VITALS
SYSTOLIC BLOOD PRESSURE: 193 MMHG | OXYGEN SATURATION: 94 % | TEMPERATURE: 98.4 F | RESPIRATION RATE: 16 BRPM | DIASTOLIC BLOOD PRESSURE: 99 MMHG | HEART RATE: 62 BPM

## 2024-02-22 DIAGNOSIS — R42 LIGHTHEADEDNESS: ICD-10-CM

## 2024-02-22 DIAGNOSIS — R73.9 ELEVATED BLOOD SUGAR: ICD-10-CM

## 2024-02-22 LAB
ALBUMIN SERPL BCG-MCNC: 3.8 G/DL (ref 3.5–5.2)
ALBUMIN UR-MCNC: NEGATIVE MG/DL
ALP SERPL-CCNC: 122 U/L (ref 40–150)
ALT SERPL W P-5'-P-CCNC: 19 U/L (ref 0–50)
ANION GAP SERPL CALCULATED.3IONS-SCNC: 12 MMOL/L (ref 7–15)
APPEARANCE UR: CLEAR
AST SERPL W P-5'-P-CCNC: 23 U/L (ref 0–45)
BACTERIA #/AREA URNS HPF: ABNORMAL /HPF
BASOPHILS # BLD AUTO: 0.1 10E3/UL (ref 0–0.2)
BASOPHILS NFR BLD AUTO: 1 %
BILIRUB SERPL-MCNC: 0.2 MG/DL
BILIRUB UR QL STRIP: NEGATIVE
BUN SERPL-MCNC: 27.9 MG/DL (ref 8–23)
CALCIUM SERPL-MCNC: 10.5 MG/DL (ref 8.8–10.2)
CHLORIDE SERPL-SCNC: 102 MMOL/L (ref 98–107)
COLOR UR AUTO: ABNORMAL
CREAT SERPL-MCNC: 1.59 MG/DL (ref 0.51–0.95)
DEPRECATED HCO3 PLAS-SCNC: 25 MMOL/L (ref 22–29)
EGFRCR SERPLBLD CKD-EPI 2021: 33 ML/MIN/1.73M2
EOSINOPHIL # BLD AUTO: 0.5 10E3/UL (ref 0–0.7)
EOSINOPHIL NFR BLD AUTO: 6 %
ERYTHROCYTE [DISTWIDTH] IN BLOOD BY AUTOMATED COUNT: 13 % (ref 10–15)
FLUAV RNA SPEC QL NAA+PROBE: NEGATIVE
FLUBV RNA RESP QL NAA+PROBE: NEGATIVE
GLUCOSE SERPL-MCNC: 344 MG/DL (ref 70–99)
GLUCOSE UR STRIP-MCNC: >=1000 MG/DL
HCT VFR BLD AUTO: 43.1 % (ref 35–47)
HGB BLD-MCNC: 13.7 G/DL (ref 11.7–15.7)
HGB UR QL STRIP: NEGATIVE
HOLD SPECIMEN: NORMAL
HYALINE CASTS: 9 /LPF
IMM GRANULOCYTES # BLD: 0 10E3/UL
IMM GRANULOCYTES NFR BLD: 0 %
KETONES UR STRIP-MCNC: NEGATIVE MG/DL
LEUKOCYTE ESTERASE UR QL STRIP: NEGATIVE
LIPASE SERPL-CCNC: 15 U/L (ref 13–60)
LYMPHOCYTES # BLD AUTO: 1.5 10E3/UL (ref 0.8–5.3)
LYMPHOCYTES NFR BLD AUTO: 18 %
MAGNESIUM SERPL-MCNC: 2 MG/DL (ref 1.7–2.3)
MCH RBC QN AUTO: 30.5 PG (ref 26.5–33)
MCHC RBC AUTO-ENTMCNC: 31.8 G/DL (ref 31.5–36.5)
MCV RBC AUTO: 96 FL (ref 78–100)
MONOCYTES # BLD AUTO: 0.8 10E3/UL (ref 0–1.3)
MONOCYTES NFR BLD AUTO: 9 %
MUCOUS THREADS #/AREA URNS LPF: PRESENT /LPF
NEUTROPHILS # BLD AUTO: 5.5 10E3/UL (ref 1.6–8.3)
NEUTROPHILS NFR BLD AUTO: 66 %
NITRATE UR QL: NEGATIVE
NRBC # BLD AUTO: 0 10E3/UL
NRBC BLD AUTO-RTO: 0 /100
NT-PROBNP SERPL-MCNC: 421 PG/ML (ref 0–1800)
PH UR STRIP: 5.5 [PH] (ref 5–7)
PLATELET # BLD AUTO: 287 10E3/UL (ref 150–450)
POTASSIUM SERPL-SCNC: 4.4 MMOL/L (ref 3.4–5.3)
PROT SERPL-MCNC: 6.6 G/DL (ref 6.4–8.3)
RBC # BLD AUTO: 4.49 10E6/UL (ref 3.8–5.2)
RBC URINE: <1 /HPF
RSV RNA SPEC NAA+PROBE: NEGATIVE
SARS-COV-2 RNA RESP QL NAA+PROBE: NEGATIVE
SODIUM SERPL-SCNC: 139 MMOL/L (ref 135–145)
SP GR UR STRIP: 1.01 (ref 1–1.03)
SQUAMOUS EPITHELIAL: <1 /HPF
TROPONIN T SERPL HS-MCNC: 24 NG/L
TROPONIN T SERPL HS-MCNC: 26 NG/L
TSH SERPL DL<=0.005 MIU/L-ACNC: 1.77 UIU/ML (ref 0.3–4.2)
UROBILINOGEN UR STRIP-MCNC: NORMAL MG/DL
WBC # BLD AUTO: 8.3 10E3/UL (ref 4–11)
WBC URINE: 4 /HPF

## 2024-02-22 PROCEDURE — 96361 HYDRATE IV INFUSION ADD-ON: CPT

## 2024-02-22 PROCEDURE — 84443 ASSAY THYROID STIM HORMONE: CPT

## 2024-02-22 PROCEDURE — 96360 HYDRATION IV INFUSION INIT: CPT

## 2024-02-22 PROCEDURE — 85025 COMPLETE CBC W/AUTO DIFF WBC: CPT

## 2024-02-22 PROCEDURE — 87637 SARSCOV2&INF A&B&RSV AMP PRB: CPT | Performed by: EMERGENCY MEDICINE

## 2024-02-22 PROCEDURE — 83690 ASSAY OF LIPASE: CPT

## 2024-02-22 PROCEDURE — 80053 COMPREHEN METABOLIC PANEL: CPT

## 2024-02-22 PROCEDURE — 84484 ASSAY OF TROPONIN QUANT: CPT

## 2024-02-22 PROCEDURE — 83880 ASSAY OF NATRIURETIC PEPTIDE: CPT

## 2024-02-22 PROCEDURE — 83735 ASSAY OF MAGNESIUM: CPT

## 2024-02-22 PROCEDURE — 74176 CT ABD & PELVIS W/O CONTRAST: CPT

## 2024-02-22 PROCEDURE — 93005 ELECTROCARDIOGRAM TRACING: CPT

## 2024-02-22 PROCEDURE — 84484 ASSAY OF TROPONIN QUANT: CPT | Performed by: EMERGENCY MEDICINE

## 2024-02-22 PROCEDURE — 36415 COLL VENOUS BLD VENIPUNCTURE: CPT | Performed by: EMERGENCY MEDICINE

## 2024-02-22 PROCEDURE — 81001 URINALYSIS AUTO W/SCOPE: CPT

## 2024-02-22 PROCEDURE — 258N000003 HC RX IP 258 OP 636: Performed by: EMERGENCY MEDICINE

## 2024-02-22 PROCEDURE — 99285 EMERGENCY DEPT VISIT HI MDM: CPT | Mod: 25

## 2024-02-22 PROCEDURE — 250N000013 HC RX MED GY IP 250 OP 250 PS 637

## 2024-02-22 RX ORDER — AMLODIPINE BESYLATE 5 MG/1
5 TABLET ORAL ONCE
Status: COMPLETED | OUTPATIENT
Start: 2024-02-22 | End: 2024-02-22

## 2024-02-22 RX ADMIN — SODIUM CHLORIDE 1000 ML: 9 INJECTION, SOLUTION INTRAVENOUS at 17:07

## 2024-02-22 RX ADMIN — SODIUM CHLORIDE 1000 ML: 9 INJECTION, SOLUTION INTRAVENOUS at 15:11

## 2024-02-22 RX ADMIN — AMLODIPINE BESYLATE 5 MG: 5 TABLET ORAL at 16:08

## 2024-02-22 NOTE — ED NOTES
Pt ambulated with assistance to bathroom to collect UA. Pt did c/o slight dizziness. RN/MD notified.

## 2024-02-22 NOTE — ED TRIAGE NOTES
Pt states generally feeling dizzy, weak, feels like her heart is going fast with any activity, feel no chest pain but feels her chest is full and belly, feels bloated, also feels like she is constipated but states went with bowl movement yesterday.  EMS states EKG normal, blood sugar 250's .      Triage Assessment (Adult)       Row Name 02/22/24 1312          Triage Assessment    Airway WDL WDL        Respiratory WDL    Respiratory WDL WDL        Cardiac WDL    Cardiac WDL WDL     Cardiac Rhythm NSR        Peripheral/Neurovascular WDL    Peripheral Neurovascular WDL WDL     Capillary Refill, General less than/equal to 3 secs        Cognitive/Neuro/Behavioral WDL    Cognitive/Neuro/Behavioral WDL WDL        [place] Coma Scale    Best Eye Response 4-->(E4) spontaneous     Best Motor Response 6-->(M6) obeys commands     Best Verbal Response 5-->(V5) oriented     [place] Coma Scale Score 15

## 2024-02-22 NOTE — ED PROVIDER NOTES
ED ATTENDING PHYSICIAN NOTE:   I evaluated this patient in conjunction with Anamika Nj PA-C  I have participated in the care of the patient and personally performed key elements of the history, exam, and medical decision making.      HPI:   Princess Edgar is a 79 year old female with a history of hyperlipidemia, hypertension, diabetes, chronic kidney disease presents emergency department with a complaint of generalized weakness.  She also has complaining of lightheadedness and a bloating sensation in her abdomen.  She denies any fevers, cough, sore throat, chest pain, vomiting, diarrhea.  Patient reports that she believes that she has had a hair mites for the past year.  She states they are infesting her ears, eyes, mouth, hair.  She states that now she thinks they are in her internal organs and that is what is causing her pancreatic lesions.  She states that she also thinks they are in her lungs.    Independent Historian:   None - Patient Only    Review of External Notes:   Reviewed patient's office visit with internal medicine on 11/6/2023, patient has diabetes and declines medications for diabetes at that visit.  Patient was also concerned about pancreatic cancer at that visit.  No signs of pancreatic cancer.     EXAM:   General: Well-nourished, resting comfortably when I enter the room  Eyes: Pupils equal, conjunctivae pink no scleral icterus or conjunctival injection  ENT:  Moist mucus membranes  Respiratory:  Lungs clear to auscultation bilaterally, no crackles/rubs/wheezes.  Good air movement  CV: Normal rate and rhythm, no murmurs  GI:  Abdomen soft and non-distended.  No tenderness, guarding or rebound  Skin: Warm, dry.  No rashes or petechiae  Musculoskeletal: No peripheral edema or calf tenderness  Neuro: Alert and oriented to person/place/time  Psychiatric: Normal affect      Independent Interpretation (X-rays, CTs, rhythm strip):  None    Consultations/Discussion of Management or Tests:  None      Social Determinants of Health affecting care:   None     MEDICAL DECISION MAKING/ASSESSMENT AND PLAN:   79-year-old female presents emergency department with a complaint of generalized fatigue, lightheadedness, and hair mites.  Patient reports that she is always generally fatigued and lightheaded.  She states today it is a little bit worse.  She states that she also has had hair mites for the past year.  She states that she has seen dermatology and has about $1000 worth of medication at home that she has tried to treat them.  She reports dermatology told her that she does not have them.  But she knows that she does.  She states that she believes they are in her eyes, ears, mouth, hair.  She also thinks that they are in her internal organs such as her lungs and pancreas.  On exam patient does not have any eye irritation, there is no visible evidence of mites in her hair, ears, eyes, mouth.  No redness, swelling, irritation.  She does not appear jaundiced.  Overall patient's workup is reassuring.  No signs of thyroid emergency.  I do not think that she has ACS.  No signs of pancreatitis.  No signs of heart failure.  No anemia.  Kidney function is at baseline.  Blood sugar is 344. Patient reports that she has diabetes, she does not check her blood sugars, she does not take any medication for her blood sugar and she does not want to be placed on any medication or any treatment for her diabetes.  No acute findings on CT of her abdomen and pelvis including lesions on her pancreas.  On reevaluation after 1 L of fluid, patient is feeling a little bit better.  Will give her a second liter as her blood sugar is also elevated.  Patient is hypertensive, I do not believe she is in hypertensive emergency or urgency.  She can follow-up with her primary care physician for evaluation of her blood pressures.  Otherwise vital signs are within acceptable limits, patient is not tachycardic or hypoxic.  Patient reports she is feeling  better.  I did stand her up to ambulate her, and she did well.  Patient's main concern today is her hair mites.  She would like oral medicines for the hair mites.  I did inform her that she will need to follow-up with dermatology.  I find no evidence of any mites.  She can get a second opinion with [facility] dermatology clinic if she would like.  I did provide her with that number.  Patient is discharged home.      DIAGNOSIS:     ICD-10-CM    1. Lightheadedness  R42       2. Elevated blood sugar  R73.9                DISPOSITION:   Patient is discharged home     Clari Lucas MD  2/22/2024  [facility] EMERGENCY DEPT      Clari Lucas MD  02/22/24 1928

## 2024-02-22 NOTE — ED PROVIDER NOTES
History     Chief Complaint:  Generalized Weakness      HPI   Princess Edgar is a 79 year old female with a history of ACS, hyperlipidemia, BPPV, hypertension, CKD, diabetes, VIANNEY, GERD, and asthma who is status post CABG presents for evaluation of generalized weakness.  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  Patient states that 2 days ago she developed generalized weakness, lightheadedness, palpitations, cough, and a bloating sensation in the chest and abdomen causing concern. The patient notes that she has shallow breathing as well, but this has been an ongoing symptom. The patient denies fever, congestion/rhinorrhea, sore throat, headache, neck pain, vision changes, chest pain, back pain, abdominal pain, urinary symptoms, nausea, vomiting, diarrhea, bloody stool, melena, numbness, or weakness.      Independent Historian:   None - Patient Only    Review of External Notes:   11/6/23: Patient valuated primary care provider for itching and concern for jaundice.  Patient did not have any obvious jaundice on exam with previous lab work being unremarkable.  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    Medications:    albuterol (PROAIR HFA/PROVENTIL HFA/VENTOLIN HFA) 108 (90 Base) MCG/ACT inhaler  amLODIPine (NORVASC) 5 MG tablet  aspirin (ASA) 81 MG tablet  atorvastatin (LIPITOR) 40 MG tablet  cephALEXin (KEFLEX) 500 MG capsule  diphenoxylate-atropine (LOMOTIL) 2.5-0.025 MG tablet  famotidine (PEPCID) 20 MG tablet  fexofenadine-pseudoePHEDrine (ALLEGRA-D)  MG 12 hr tablet  fluocinonide (LIDEX) 0.05 % external  solution  fluticasone (FLONASE) 50 MCG/ACT nasal spray  ketoconazole (NIZORAL) 2 % external cream  LORAZEPAM PO  metoprolol succinate ER (TOPROL-XL) 25 MG 24 hr tablet  Multiple Vitamins-Minerals (MULTI FOR HER PO)  nitroGLYcerin (NITROSTAT) 0.4 MG sublingual tablet  olopatadine (PATADAY) 0.2 % ophthalmic solution  terazosin (HYTRIN) 1 MG capsule  traZODone (DESYREL) 100 MG tablet  venlafaxine (EFFEXOR-XR) 150 MG 24 hr capsule        Past Medical History:    Past Medical History:   Diagnosis Date    Anxiety     Arthritis     BPPV (benign paroxysmal positional vertigo)     Chronic insomnia 11/29/2016    Chronic seasonal allergic rhinitis due to fungal spores 9/17/2015    CKD (chronic kidney disease) stage 3, GFR 30-59 ml/min (H) 6/23/2016    Controlled type 2 diabetes mellitus with stage 3 chronic kidney disease, without long-term current use of insulin (H) 9/11/2015    Coronary artery disease involving native coronary artery of native heart without angina pectoris 9/17/2015    GERD (gastroesophageal reflux disease)     HLD (hyperlipidemia)     Hypertension     IBS (irritable bowel syndrome)     Obstructive sleep apnea 11/30/2015       Past Surgical History:    Past Surgical History:   Procedure Laterality Date    CARDIAC SURGERY      CV ANGIOGRAM CORONARY GRAFT N/A 6/11/2019    Procedure: Angiogram Coronary Graft;  Surgeon: Hayden Muñiz MD;  Location:  HEART CARDIAC CATH LAB    CV INSTANTANEOUS WAVE-FREE RATIO N/A 6/11/2019    Procedure: Instantaneous Wave-Free Ratio;  Surgeon: Hayden Muñiz MD;  Location:  HEART CARDIAC CATH LAB    CV LEFT HEART CATH N/A 6/11/2019    Procedure: Left Heart Cath;  Surgeon: Hayden Muñiz MD;  Location:  HEART CARDIAC CATH LAB    EYE SURGERY      ORTHOPEDIC SURGERY          Physical Exam   Patient Vitals for the past 24 hrs:   BP Temp Temp src Pulse Resp SpO2   02/22/24 1825 (!) 193/99 -- -- 62 -- 94 %   02/22/24 1815 (!) 222/87 -- -- 60 -- 94 %   02/22/24 1801 -- --  -- 69 -- --   02/22/24 1757 -- -- -- 60 -- 94 %   02/22/24 1756 -- -- -- 79 -- 93 %   02/22/24 1747 (!) 195/91 -- -- 64 -- 95 %   02/22/24 1735 (!) 190/93 -- -- 67 -- 96 %   02/22/24 1701 -- -- -- 79 -- 98 %   02/22/24 1700 (!) 203/89 -- -- 75 -- --   02/22/24 1647 (!) 193/100 -- -- 73 -- 95 %   02/22/24 1642 -- -- -- 68 -- 97 %   02/22/24 1637 -- -- -- 73 -- 97 %   02/22/24 1632 (!) 177/91 -- -- 71 -- 96 %   02/22/24 1628 -- -- -- 75 -- 97 %   02/22/24 1627 (!) 184/86 -- -- 76 -- 96 %   02/22/24 1626 -- -- -- 73 -- 97 %   02/22/24 1624 -- -- -- 77 -- 97 %   02/22/24 1623 -- -- -- 72 -- 97 %   02/22/24 1622 (!) 205/163 -- -- 80 -- 98 %   02/22/24 1608 (!) 182/95 -- -- -- -- --   02/22/24 1607 (!) 182/95 -- -- 78 -- 93 %   02/22/24 1600 -- -- -- 53 -- 94 %   02/22/24 1558 -- -- -- 58 -- 95 %   02/22/24 1557 -- -- -- 70 -- 93 %   02/22/24 1556 -- -- -- 53 -- 94 %   02/22/24 1554 -- -- -- 61 -- 94 %   02/22/24 1552 -- -- -- 61 -- 95 %   02/22/24 1551 -- -- -- 56 -- 95 %   02/22/24 1541 -- -- -- 71 -- 96 %   02/22/24 1540 -- -- -- 64 -- 96 %   02/22/24 1539 -- -- -- 63 -- 96 %   02/22/24 1538 -- -- -- 58 -- 97 %   02/22/24 1517 -- -- -- 60 -- 94 %   02/22/24 1516 -- -- -- 60 -- 95 %   02/22/24 1514 (!) 197/85 -- -- 68 -- 94 %   02/22/24 1513 (!) 180/149 -- -- 67 -- 92 %   02/22/24 1443 -- -- -- 61 -- 94 %   02/22/24 1439 -- -- -- 68 -- 96 %   02/22/24 1435 -- -- -- 67 -- 96 %   02/22/24 1433 -- -- -- 73 -- 95 %   02/22/24 1432 -- -- -- 64 -- 95 %   02/22/24 1430 (!) 178/85 -- -- 71 -- 97 %   02/22/24 1342 -- 98.4  F (36.9  C) Oral -- -- --   02/22/24 1311 (!) 188/108 -- -- 73 16 99 %        Physical Exam  General: Alert, appears well-developed and well-nourished. Cooperative.     In moderate distress  HEENT:  Head:  Atraumatic  Ears:  External ears are normal  Mouth/Throat:  Oropharynx is without erythema or exudate and mucous membranes are moist.   Eyes:   Conjunctivae normal and EOM are normal. No scleral  icterus.    Pupils are equal, round, and reactive to light.   Neck:   Normal range of motion. Neck supple.  CV:  Normal rate, regular rhythm, normal heart sounds and radial and dorsalis pedis pulses are 2+ and symmetric.  No murmur.  Resp:  Breath sounds are clear bilaterally    Non-labored, no retractions or accessory muscle use  GI:  Mild distension. Abdomen is soft, no tenderness. No rebound or guarding.  MS:  Normal range of motion. No edema.    Normal strength in all 4 extremities.     Back atraumatic.    No midline cervical, thoracic, or lumbar tenderness  Skin:  Warm and dry.  No rash or lesions noted.  No excoriations.  No erythema or rashes of the eyebrows.  Neuro:   Alert. Normal strength.  Sensation intact in all 4 extremities.     Cranial nerves 2-12 intact. No pronator drift. Normal finger nose finger, heel to shin.   Psych: Normal mood and affect.    Emergency Department Course   ECG  ECG results from 02/22/24   EKG 12-lead, tracing only     Value    Systolic Blood Pressure     Diastolic Blood Pressure     Ventricular Rate 69    Atrial Rate 69    VT Interval 178    QRS Duration 84        QTc 422    P Axis 47    R AXIS 55    T Axis 71    Interpretation ECG      Sinus rhythm  Normal ECG  When compared with ECG of 17-APR-2020 15:40,  Nonspecific T wave abnormality no longer evident in Anterolateral leads  Interpreted by Dr. Lucas.     Imaging:  CT Abdomen Pelvis w/o Contrast   Final Result   IMPRESSION:    No acute findings within the abdomen or pelvis.      KWAME TPOETE MD            SYSTEM ID:  [id number]         Laboratory:  Labs Ordered and Resulted from Time of ED Arrival to Time of ED Departure   COMPREHENSIVE METABOLIC PANEL - Abnormal       Result Value    Sodium 139      Potassium 4.4      Carbon Dioxide (CO2) 25      Anion Gap 12      Urea Nitrogen 27.9 (*)     Creatinine 1.59 (*)     GFR Estimate 33 (*)     Calcium 10.5 (*)     Chloride 102      Glucose 344 (*)     Alkaline  Phosphatase 122      AST 23      ALT 19      Protein Total 6.6      Albumin 3.8      Bilirubin Total 0.2     TROPONIN T, HIGH SENSITIVITY - Abnormal    Troponin T, High Sensitivity 24 (*)    ROUTINE UA WITH MICROSCOPIC REFLEX TO CULTURE - Abnormal    Color Urine Light Yellow      Appearance Urine Clear      Glucose Urine >=1000 (*)     Bilirubin Urine Negative      Ketones Urine Negative      Specific Gravity Urine 1.015      Blood Urine Negative      pH Urine 5.5      Protein Albumin Urine Negative      Urobilinogen Urine Normal      Nitrite Urine Negative      Leukocyte Esterase Urine Negative      Bacteria Urine Few (*)     Mucus Urine Present (*)     RBC Urine <1      WBC Urine 4      Squamous Epithelials Urine <1      Hyaline Casts Urine 9 (*)    TROPONIN T, HIGH SENSITIVITY - Abnormal    Troponin T, High Sensitivity 26 (*)    INFLUENZA A/B, RSV, & SARS-COV2 PCR - Normal    Influenza A PCR Negative      Influenza B PCR Negative      RSV PCR Negative      SARS CoV2 PCR Negative     LIPASE - Normal    Lipase 15     TSH WITH FREE T4 REFLEX - Normal    TSH 1.77     NT PROBNP INPATIENT - Normal    N terminal Pro BNP Inpatient 421     MAGNESIUM - Normal    Magnesium 2.0     CBC WITH PLATELETS AND DIFFERENTIAL    WBC Count 8.3      RBC Count 4.49      Hemoglobin 13.7      Hematocrit 43.1      MCV 96      MCH 30.5      MCHC 31.8      RDW 13.0      Platelet Count 287      % Neutrophils 66      % Lymphocytes 18      % Monocytes 9      % Eosinophils 6      % Basophils 1      % Immature Granulocytes 0      NRBCs per 100 WBC 0      Absolute Neutrophils 5.5      Absolute Lymphocytes 1.5      Absolute Monocytes 0.8      Absolute Eosinophils 0.5      Absolute Basophils 0.1      Absolute Immature Granulocytes 0.0      Absolute NRBCs 0.0          Procedures   None    Emergency Department Course & Assessments:    Interventions:  Medications   sodium chloride 0.9% BOLUS 1,000 mL (0 mLs Intravenous Stopped 2/22/24 1342)    amLODIPine (NORVASC) tablet 5 mg (5 mg Oral $Given 2/22/24 1608)   sodium chloride 0.9% BOLUS 1,000 mL (0 mLs Intravenous Stopped 2/22/24 1830)        Assessments:  1305: Initial evaluation and assessment.    Independent Interpretation (X-rays, CTs, rhythm strip):  None    Consultations/Discussion of Management or Tests:  Patient was seen in conjunction with Dr. Lucas.     Social Determinants of Health affecting care:   None    Disposition:  The patient was discharged.     Impression & Plan    CMS Diagnoses: None    Medical Decision Making:  Princess Edgar is a 79 year old female with a history of ACS, hyperlipidemia, BPPV, hypertension, CKD, diabetes, VIANNEY, GERD, and asthma who is status post CABG presents for evaluation of generalized weakness.  On exam, the patient has regular rate and rhythm with clear breath sounds bilaterally and no focal neurologic deficits.  Patient was most concerned about Demodex, which is a parasitic folliculitis of the skin, but does not have any evidence on exam of erythema, rashes, or excoriations.  Vital signs show elevated blood pressure which is consistent throughout ED course without fever, tachycardia, or hypoxia.  Blood work showed evidence of chronic kidney disease and hyperglycemia without leukocytosis, anemia, or other electrolyte abnormalities.  Patient does not have an anion gap and bicarb is within normal limits.  Lipase is within normal limits.  TSH is within normal limits.  UA shows no evidence of infection.  COVID, influenza, RSV are negative.  Magnesium is within normal limits.  BNP is within normal limits.  Serial troponins are negative.  EKG shows no evidence of ischemia or arrhythmia. CT abdomen pelvis shows evidence of attenuation of aggregating medical hernia without any other acute findings within the abdomen or pelvis.  We provided the patient with 2 L of normal saline and 5 mg of amlodipine due to elevated blood pressure.  Her symptoms remained stable  throughout ED course.  At this time, there are no objective findings of folliculitis and therefore we did not prescribe antibiotics for demodex and recommended follow-up with dermatology for further evaluation.  There are otherwise no concerning findings on exam, workup, or imaging while in the emergency department.  She was advised to also follow-up with her primary care provider for reevaluation as soon as she is able to.  Patient states that she does not take anything for diabetes/hyperglycemia, and she was offered a prescription for this which she declined.  Patient was advised to return to the emergency department for fevers, chest pain, shortness of breath, headache, vision changes, neck pain, abdominal pain, vomiting, diarrhea, bloody stool, numbness, weakness, or any other new concerns.  The patient was in agreement with this plan.      Diagnosis:    ICD-10-CM    1. Lightheadedness  R42       2. Elevated blood sugar  R73.9              Anamika Nj PA-C  2/22/2024        Anamika Nj PA-C  02/22/24 1948       Anamika Nj PA-C  02/22/24 1949

## 2024-02-23 LAB
ATRIAL RATE - MUSE: 69 BPM
DIASTOLIC BLOOD PRESSURE - MUSE: NORMAL MMHG
INTERPRETATION ECG - MUSE: NORMAL
P AXIS - MUSE: 47 DEGREES
PR INTERVAL - MUSE: 178 MS
QRS DURATION - MUSE: 84 MS
QT - MUSE: 394 MS
QTC - MUSE: 422 MS
R AXIS - MUSE: 55 DEGREES
SYSTOLIC BLOOD PRESSURE - MUSE: NORMAL MMHG
T AXIS - MUSE: 71 DEGREES
VENTRICULAR RATE- MUSE: 69 BPM

## 2024-02-23 NOTE — DISCHARGE INSTRUCTIONS
Please return to the emergency department if your symptoms increase, you experience an increase in lightheadedness.  Or if you have any chest pain or shortness of breath.    If you would like a second opinion from a dermatologist, you can call the [facility] clinic in [place]. Their number is [phone]    Please follow-up with your primary care physician.

## 2024-03-01 ENCOUNTER — TELEPHONE (OUTPATIENT)
Dept: CARDIOLOGY | Facility: CLINIC | Age: 79
End: 2024-03-01
Payer: COMMERCIAL

## 2024-03-01 NOTE — TELEPHONE ENCOUNTER
[facility] Call Center    Phone Message    May a detailed message be left on voicemail: yes     Reason for Call: Bottom number for Bp has been high 195/99, was in the hospital due to this, please advise -declined triage as still high     Action Taken: Other: cardiology    Travel Screening: Not Applicable  Thank you!  [facility]

## 2024-03-01 NOTE — TELEPHONE ENCOUNTER
Returned call to patient, message left to return call to RN at [phone].  Marcia Holland, RN on 3/1/2024 at 3:21 PM

## 2024-03-05 NOTE — TELEPHONE ENCOUNTER
Called patient, states she has not taken her blood pressure for couple of days.  Patient advised to check BP now.  BP per home cuff was on first check 99/64 and with the second check was 140/83.  Patient states she is not put fresh batteries in her monitor recently so advised to do that.  Patient states she is taking her amlodipine and metoprolol but is not taking the terazosin as she states her PCP told her to stop it for high blood pressure.  Patient advised terazosin would not be stopped for high blood pressure as it is a blood pressure lowering medication.  Patient advised to make sure her BP cuff has fresh batteries and to check BP twice daily at least an hour after medications and to send in these readings after a week.  Patient verbalized understanding.  FRANKLIN Canales RN

## 2024-04-23 DIAGNOSIS — I25.10 CORONARY ARTERY DISEASE INVOLVING NATIVE CORONARY ARTERY OF NATIVE HEART WITHOUT ANGINA PECTORIS: ICD-10-CM

## 2024-04-23 RX ORDER — ATORVASTATIN CALCIUM 40 MG/1
40 TABLET, FILM COATED ORAL AT BEDTIME
Qty: 90 TABLET | Refills: 0 | Status: SHIPPED | OUTPATIENT
Start: 2024-04-23 | End: 2024-07-09

## 2024-06-02 ENCOUNTER — HEALTH MAINTENANCE LETTER (OUTPATIENT)
Age: 79
End: 2024-06-02

## 2024-07-09 DIAGNOSIS — I25.10 CORONARY ARTERY DISEASE INVOLVING NATIVE CORONARY ARTERY OF NATIVE HEART WITHOUT ANGINA PECTORIS: ICD-10-CM

## 2024-07-09 RX ORDER — ATORVASTATIN CALCIUM 40 MG/1
40 TABLET, FILM COATED ORAL AT BEDTIME
Qty: 90 TABLET | Refills: 0 | Status: SHIPPED | OUTPATIENT
Start: 2024-07-09

## 2024-07-09 NOTE — LETTER
July 9, 2024       TO: Princess Edgar  [address]  [facility] [place] [address]       Dear Princess Edgar,    We recently received a call from your pharmacy requesting a refill of your medication(s).    Our records indicate that you are due for follow-up with your Heart Care Provider. We will refill your medications for 3 months which will allow you enough time to be seen.    Please call [phone] to schedule your appointment.    Thank you for allowing [facility] to be a part of your health care team and we look forward to seeing you soon.    Thank you,    [facility]

## 2024-09-25 ENCOUNTER — HOSPITAL ENCOUNTER (EMERGENCY)
Facility: CLINIC | Age: 79
Discharge: HOME | End: 2024-09-25
Attending: EMERGENCY MEDICINE | Admitting: EMERGENCY MEDICINE
Payer: COMMERCIAL

## 2024-09-25 VITALS
WEIGHT: 192.9 LBS | OXYGEN SATURATION: 92 % | SYSTOLIC BLOOD PRESSURE: 184 MMHG | BODY MASS INDEX: 32.93 KG/M2 | DIASTOLIC BLOOD PRESSURE: 90 MMHG | RESPIRATION RATE: 20 BRPM | TEMPERATURE: 97.1 F | HEIGHT: 64 IN | HEART RATE: 69 BPM

## 2024-09-25 DIAGNOSIS — N39.0 URINARY TRACT INFECTION WITH HEMATURIA, SITE UNSPECIFIED: ICD-10-CM

## 2024-09-25 DIAGNOSIS — R31.9 URINARY TRACT INFECTION WITH HEMATURIA, SITE UNSPECIFIED: ICD-10-CM

## 2024-09-25 LAB
ALBUMIN UR-MCNC: 70 MG/DL
APPEARANCE UR: ABNORMAL
BACTERIA #/AREA URNS HPF: ABNORMAL /HPF
BILIRUB UR QL STRIP: NEGATIVE
COLOR UR AUTO: YELLOW
GLUCOSE UR STRIP-MCNC: NEGATIVE MG/DL
HGB UR QL STRIP: ABNORMAL
HYALINE CASTS: 8 /LPF
KETONES UR STRIP-MCNC: NEGATIVE MG/DL
LEUKOCYTE ESTERASE UR QL STRIP: ABNORMAL
MUCOUS THREADS #/AREA URNS LPF: PRESENT /LPF
NITRATE UR QL: NEGATIVE
PH UR STRIP: 6 [PH] (ref 5–7)
RBC URINE: >182 /HPF
SP GR UR STRIP: 1.02 (ref 1–1.03)
UROBILINOGEN UR STRIP-MCNC: NORMAL MG/DL
WBC CLUMPS #/AREA URNS HPF: PRESENT /HPF
WBC URINE: >182 /HPF

## 2024-09-25 PROCEDURE — 250N000013 HC RX MED GY IP 250 OP 250 PS 637: Performed by: EMERGENCY MEDICINE

## 2024-09-25 PROCEDURE — 81001 URINALYSIS AUTO W/SCOPE: CPT | Performed by: EMERGENCY MEDICINE

## 2024-09-25 PROCEDURE — 87086 URINE CULTURE/COLONY COUNT: CPT | Performed by: EMERGENCY MEDICINE

## 2024-09-25 PROCEDURE — 99283 EMERGENCY DEPT VISIT LOW MDM: CPT

## 2024-09-25 RX ORDER — CEPHALEXIN 500 MG/1
500 CAPSULE ORAL ONCE
Status: COMPLETED | OUTPATIENT
Start: 2024-09-25 | End: 2024-09-25

## 2024-09-25 RX ORDER — CEPHALEXIN 500 MG/1
500 CAPSULE ORAL 2 TIMES DAILY
Qty: 14 CAPSULE | Refills: 0 | Status: SHIPPED | OUTPATIENT
Start: 2024-09-25 | End: 2024-10-02

## 2024-09-25 RX ADMIN — CEPHALEXIN 500 MG: 500 CAPSULE ORAL at 22:52

## 2024-09-25 ASSESSMENT — ACTIVITIES OF DAILY LIVING (ADL)
ADLS_ACUITY_SCORE: 38
ADLS_ACUITY_SCORE: 38

## 2024-09-25 ASSESSMENT — COLUMBIA-SUICIDE SEVERITY RATING SCALE - C-SSRS
6. HAVE YOU EVER DONE ANYTHING, STARTED TO DO ANYTHING, OR PREPARED TO DO ANYTHING TO END YOUR LIFE?: NO
1. IN THE PAST MONTH, HAVE YOU WISHED YOU WERE DEAD OR WISHED YOU COULD GO TO SLEEP AND NOT WAKE UP?: NO
2. HAVE YOU ACTUALLY HAD ANY THOUGHTS OF KILLING YOURSELF IN THE PAST MONTH?: NO

## 2024-09-26 NOTE — ED NOTES
Pt discharged. Discharged instructions and papers given. Verbalizes understanding. Discharged on stable condition. Ambulatory.

## 2024-09-26 NOTE — ED TRIAGE NOTES
"Pt arrives to ED with symptoms of dysuria, frequent urination, and blood in urine. Pt began using \"cream\" yesterday from [employer] due to symptoms      "

## 2024-09-26 NOTE — DISCHARGE INSTRUCTIONS
Take the Keflex as directed until complete    Follow-up with your primary care doctor in 1 to 2 days    Return to the ER for any worsening or concerning symptoms

## 2024-09-26 NOTE — ED PROVIDER NOTES
Emergency Department Note      History of Present Illness     Chief Complaint   Dysuria and Hematuria    HPI   Princess Edgar is a 79 year old female with a history of CKD stage 3, type 2 diabetes, CAD, hypertension, and hyperlipidemia who presents to the ER for dysuria and hematuria. Patient reports dark pink blood and burning when urinating and having many similar UTI's previously. She states that oral antibiotics has worked in the past and that her last UTI was a couple years ago. She denies abdominal pain, back pain, nausea, vomiting, nephrolithiasis, or nephrectomy. Princess has a history of kidney failure states it is 2/2 to DM.     Independent Historian   None    Review of External Notes   Last on keflex for UTI   Urine cx reviewed that showed no growth    Past Medical History     Medical History and Problem List   Anxiety  Arthritis  BPPV  Chronic insomnia  CKD stage 3  type 2 diabetes   GERD  Hyperlipidemia   Hypertension  IBS  Obstructive sleep apnea  Renal artery stenosis   IBS   GASPER   CAD      Medications   Albuterol   Amlodipine   Aspirin 81 mg  Atorvastatin   Diphenoxylate   Famotidine   Fexofenadine   Lorazepam   Metoprolol   Nitroglycerin   Trazodone   Diazepam   Ondansetron     Surgical History   Coronary angiogram   Instantaneous wave free radio   Left heart cath  Eye surgery   Orthopedic surgery   Iridotomy   Breast biopsy   Cataract removal   Physical Exam     Patient Vitals for the past 24 hrs:   BP Temp Temp src Pulse Resp SpO2 Weight   09/25/24 2059 (!) 142/83 97.1  F (36.2  C) Temporal 97 20 95 % 87.5 kg (192 lb 14.4 oz)     Physical Exam  Vitals reviewed.   Constitutional:       General: She is not in acute distress.     Appearance: She is not ill-appearing.   HENT:      Head: Normocephalic and atraumatic.   Eyes:      Extraocular Movements: Extraocular movements intact.   Cardiovascular:      Rate and Rhythm: Normal rate and regular rhythm.   Pulmonary:      Effort: Pulmonary effort is  normal. No respiratory distress.   Abdominal:      Palpations: Abdomen is soft.      Tenderness: There is no abdominal tenderness. There is no guarding or rebound.   Musculoskeletal:      Cervical back: Normal range of motion.   Skin:     General: Skin is warm and dry.   Neurological:      Mental Status: She is alert and oriented to person, place, and time.      GCS: GCS eye subscore is 4. GCS verbal subscore is 5. GCS motor subscore is 6.   Psychiatric:         Behavior: Behavior normal.           Diagnostics     Lab Results   Labs Ordered and Resulted from Time of ED Arrival to Time of ED Departure   ROUTINE UA WITH MICROSCOPIC REFLEX TO CULTURE - Abnormal       Result Value    Color Urine Yellow      Appearance Urine Cloudy (*)     Glucose Urine Negative      Bilirubin Urine Negative      Ketones Urine Negative      Specific Gravity Urine 1.019      Blood Urine Large (*)     pH Urine 6.0      Protein Albumin Urine 70 (*)     Urobilinogen Urine Normal      Nitrite Urine Negative      Leukocyte Esterase Urine Large (*)     Bacteria Urine Few (*)     WBC Clumps Urine Present (*)     Mucus Urine Present (*)     RBC Urine >182 (*)     WBC Urine >182 (*)     Hyaline Casts Urine 8 (*)    URINE CULTURE       Imaging   No orders to display     Independent Interpretation   None    ED Course      Medications Administered   Medications   cephALEXin (KEFLEX) capsule 500 mg (has no administration in time range)     Discussion of Management   None    ED Course   ED Course as of 09/25/24 2249   Wed Sep 25, 2024   2206 I obtained the history and examined the patient as noted above.    2211 Prior urine cultures reviewed most recent urine culture had no growth.  The one prior to that had pan susceptibility.       Additional Documentation  None    Medical Decision Making / Diagnosis     CMS Diagnoses: None    MIPS       None    MDM   Princess Edgar is a 79 year old female who presents today with hematuria and dysuria.  She reports  history of frequent urinary tract infections.  States that recently developed hematuria and dysuria.  Denies any abdominal pain or flank pain.  No recent fevers.  Denies any nausea or vomiting.  She states that she gets blood work frequently.  States that she does not want any blood work done today.  Low suspicion for pyelonephritis or upper urinary tract infection patient is no CVA tenderness or pain.  Also very low suspicion for kidney stone patient sitting very comfortably.  No history of stones.  Describes no flank pain or abdominal pain.  I discussed with her the urine results.  Discussed with her the plan for antibiotics.  I did review her prior urine cultures most recent urine culture showed no growth prior to that she grew E. coli that was pansensitive.  I discussed with her the plan for Keflex.  She was given a dose here and discharged home with Keflex.  Explained to patient if she has no improvement despite antibiotics in 2 days return to the ER or see her primary care doctor.  She verbalized understanding and agreement.    Disposition   The patient was discharged.     Diagnosis     ICD-10-CM    1. Urinary tract infection with hematuria, site unspecified  N39.0     R31.9            Discharge Medications   New Prescriptions    CEPHALEXIN (KEFLEX) 500 MG CAPSULE    Take 1 capsule (500 mg) by mouth 2 times daily for 7 days.         Scribe Disclosure:  I, Nick Beckett, am serving as a scribe at 10:42 PM on 9/25/2024 to document services personally performed by Billie Yanes DO based on my observations and the provider's statements to me.        Billie Yanes DO  09/25/24 5031

## 2024-09-27 LAB — BACTERIA UR CULT: NORMAL

## 2024-09-27 NOTE — RESULT ENCOUNTER NOTE
Final urine culture report is negative.  Adult: Negative urine culture parameters per protocol: Any # urogenital bandar, single or mixed   [facility] Emergency Dept discharge antibiotic prescribed (If applicable): cephalexin  Treatment recommendations per [facility] ED Lab Result Urine Culture protocol: No change in plan of care.

## 2024-10-20 ENCOUNTER — HEALTH MAINTENANCE LETTER (OUTPATIENT)
Age: 79
End: 2024-10-20

## 2025-01-26 ENCOUNTER — HEALTH MAINTENANCE LETTER (OUTPATIENT)
Age: 80
End: 2025-01-26

## 2025-04-12 ENCOUNTER — HEALTH MAINTENANCE LETTER (OUTPATIENT)
Age: 80
End: 2025-04-12

## 2025-05-03 ENCOUNTER — HEALTH MAINTENANCE LETTER (OUTPATIENT)
Age: 80
End: 2025-05-03

## 2025-08-16 ENCOUNTER — HEALTH MAINTENANCE LETTER (OUTPATIENT)
Age: 80
End: 2025-08-16

## (undated) DEVICE — PACK CUSTOM CATH LAB RIDGES LF PC15CCFCJ

## (undated) DEVICE — KIT HAND CONTROL ANGIOTOUCH ACIST 65CM AT-P65

## (undated) DEVICE — CATH GUIDING L100 CM OD5 FR LNCHR C

## (undated) DEVICE — RAD G/W INQWIRE .035X260CM J-TIP EXCHANGE IQ35F260J1O5RS

## (undated) DEVICE — GUIDEWIRE VERRATA PLUS PRESSURE 185CM JTIP 10185JP

## (undated) DEVICE — CATH ANGIO SUPERTORQUE AL1 6FRX100CM 532-645

## (undated) DEVICE — DEFIB PRO-PADZ LVP LQD GEL ADULT 8900-2105-01

## (undated) DEVICE — GUIDEWIRE VASC 0.035INX150CM INQWIRE J TIP IQ35F150J3F/A

## (undated) DEVICE — CATH ANGIO JUDKINS JL3.5 6FRX100CM INFINITI 534618T

## (undated) DEVICE — CATH ANGIO INFINITI INTERNAL MAMMARY 6FRX100CM 534-660T

## (undated) DEVICE — CATH ANGIO JUDKINS R4 6FRX100CM INFINITI 534621T

## (undated) DEVICE — KIT LG BORE TOUHY ACCESS PLUS MAP152

## (undated) DEVICE — INTRO GLIDESHEATH SLENDER 6FR 10X45CM 60-1060

## (undated) DEVICE — SLEEVE TR BAND RADIAL COMPRESSION DEVICE 24CM TRB24-REG

## (undated) DEVICE — CATH GUIDING 100CM 6FR LNCHR COR

## (undated) DEVICE — CATH ANGIO JUDKINS JL4 6FRX100CM INFINITI 534620T

## (undated) DEVICE — INTRO SHEATH 4FRX10CM PINNACLE RSS402

## (undated) DEVICE — FASTENER CATH BALLOON CLAMPX2 STATLOCK 0684-00-493

## (undated) DEVICE — CATH ANGIO INFINITI IM 4FRX100CM 538460

## (undated) DEVICE — CATH GUIDING BLUE YELLOW PTFE XB3 6FRX100CM 67005200

## (undated) DEVICE — CATH ANGIO INFINITI JL4 4FRX100CM 538420

## (undated) DEVICE — MANIFOLD KIT ANGIO AUTOMATED 014613